# Patient Record
Sex: MALE | Race: WHITE | Employment: OTHER | URBAN - NONMETROPOLITAN AREA
[De-identification: names, ages, dates, MRNs, and addresses within clinical notes are randomized per-mention and may not be internally consistent; named-entity substitution may affect disease eponyms.]

---

## 2018-12-14 ENCOUNTER — APPOINTMENT (OUTPATIENT)
Dept: CT IMAGING | Age: 83
DRG: 481 | End: 2018-12-14
Payer: MEDICARE

## 2018-12-14 ENCOUNTER — APPOINTMENT (OUTPATIENT)
Dept: GENERAL RADIOLOGY | Age: 83
DRG: 481 | End: 2018-12-14
Payer: MEDICARE

## 2018-12-14 ENCOUNTER — HOSPITAL ENCOUNTER (INPATIENT)
Age: 83
LOS: 2 days | Discharge: HOME | DRG: 481 | End: 2018-12-16
Attending: INTERNAL MEDICINE | Admitting: ORTHOPAEDIC SURGERY
Payer: MEDICARE

## 2018-12-14 DIAGNOSIS — S72.142A CLOSED DISPLACED INTERTROCHANTERIC FRACTURE OF LEFT FEMUR, INITIAL ENCOUNTER (HCC): Primary | ICD-10-CM

## 2018-12-14 PROBLEM — E11.9 TYPE 2 DIABETES MELLITUS (HCC): Status: ACTIVE | Noted: 2018-12-14

## 2018-12-14 LAB
ANION GAP SERPL CALCULATED.3IONS-SCNC: 16 MEQ/L (ref 8–16)
BACTERIA: ABNORMAL /HPF
BASOPHILS # BLD: 0.6 %
BASOPHILS ABSOLUTE: 0 THOU/MM3 (ref 0–0.1)
BILIRUBIN URINE: NEGATIVE
BLOOD, URINE: NEGATIVE
BUN BLDV-MCNC: 11 MG/DL (ref 7–22)
CALCIUM SERPL-MCNC: 9 MG/DL (ref 8.5–10.5)
CASTS 2: ABNORMAL /LPF
CASTS UA: ABNORMAL /LPF
CHARACTER, URINE: CLEAR
CHLORIDE BLD-SCNC: 100 MEQ/L (ref 98–111)
CO2: 23 MEQ/L (ref 23–33)
COLOR: YELLOW
CREAT SERPL-MCNC: 0.7 MG/DL (ref 0.4–1.2)
CRYSTALS, UA: ABNORMAL
EKG ATRIAL RATE: 76 BPM
EKG Q-T INTERVAL: 456 MS
EKG QRS DURATION: 90 MS
EKG QTC CALCULATION (BAZETT): 415 MS
EKG R AXIS: -29 DEGREES
EKG T AXIS: 13 DEGREES
EKG VENTRICULAR RATE: 50 BPM
EOSINOPHIL # BLD: 3.6 %
EOSINOPHILS ABSOLUTE: 0.3 THOU/MM3 (ref 0–0.4)
EPITHELIAL CELLS, UA: ABNORMAL /HPF
ERYTHROCYTE [DISTWIDTH] IN BLOOD BY AUTOMATED COUNT: 13.8 % (ref 11.5–14.5)
ERYTHROCYTE [DISTWIDTH] IN BLOOD BY AUTOMATED COUNT: 44.7 FL (ref 35–45)
GFR SERPL CREATININE-BSD FRML MDRD: > 90 ML/MIN/1.73M2
GLUCOSE BLD-MCNC: 151 MG/DL (ref 70–108)
GLUCOSE BLD-MCNC: 157 MG/DL (ref 70–108)
GLUCOSE URINE: 100 MG/DL
HCT VFR BLD CALC: 42.9 % (ref 42–52)
HEMOGLOBIN: 14.2 GM/DL (ref 14–18)
IMMATURE GRANS (ABS): 0.05 THOU/MM3 (ref 0–0.07)
IMMATURE GRANULOCYTES: 0.7 %
KETONES, URINE: ABNORMAL
LEUKOCYTE ESTERASE, URINE: NEGATIVE
LYMPHOCYTES # BLD: 37.2 %
LYMPHOCYTES ABSOLUTE: 2.7 THOU/MM3 (ref 1–4.8)
MCH RBC QN AUTO: 29.4 PG (ref 26–33)
MCHC RBC AUTO-ENTMCNC: 33.1 GM/DL (ref 32.2–35.5)
MCV RBC AUTO: 88.8 FL (ref 80–94)
MISCELLANEOUS 2: ABNORMAL
MONOCYTES # BLD: 10.5 %
MONOCYTES ABSOLUTE: 0.8 THOU/MM3 (ref 0.4–1.3)
NITRITE, URINE: NEGATIVE
NUCLEATED RED BLOOD CELLS: 0 /100 WBC
OSMOLALITY CALCULATION: 279.9 MOSMOL/KG (ref 275–300)
PH UA: 5
PLATELET # BLD: 194 THOU/MM3 (ref 130–400)
PMV BLD AUTO: 11.1 FL (ref 9.4–12.4)
POTASSIUM SERPL-SCNC: 4 MEQ/L (ref 3.5–5.2)
PROTEIN UA: NEGATIVE
RBC # BLD: 4.83 MILL/MM3 (ref 4.7–6.1)
RBC URINE: ABNORMAL /HPF
RENAL EPITHELIAL, UA: ABNORMAL
SEG NEUTROPHILS: 47.4 %
SEGMENTED NEUTROPHILS ABSOLUTE COUNT: 3.4 THOU/MM3 (ref 1.8–7.7)
SODIUM BLD-SCNC: 139 MEQ/L (ref 135–145)
SPECIFIC GRAVITY, URINE: 1.02 (ref 1–1.03)
TROPONIN T: < 0.01 NG/ML
TROPONIN T: < 0.01 NG/ML
TSH SERPL DL<=0.05 MIU/L-ACNC: 2.03 UIU/ML (ref 0.4–4.2)
UROBILINOGEN, URINE: 0.2 EU/DL
WBC # BLD: 7.2 THOU/MM3 (ref 4.8–10.8)
WBC UA: ABNORMAL /HPF
YEAST: ABNORMAL

## 2018-12-14 PROCEDURE — 36415 COLL VENOUS BLD VENIPUNCTURE: CPT

## 2018-12-14 PROCEDURE — 72125 CT NECK SPINE W/O DYE: CPT

## 2018-12-14 PROCEDURE — 94760 N-INVAS EAR/PLS OXIMETRY 1: CPT

## 2018-12-14 PROCEDURE — 2580000003 HC RX 258: Performed by: NURSE PRACTITIONER

## 2018-12-14 PROCEDURE — 73552 X-RAY EXAM OF FEMUR 2/>: CPT

## 2018-12-14 PROCEDURE — 96374 THER/PROPH/DIAG INJ IV PUSH: CPT

## 2018-12-14 PROCEDURE — 81001 URINALYSIS AUTO W/SCOPE: CPT

## 2018-12-14 PROCEDURE — 84443 ASSAY THYROID STIM HORMONE: CPT

## 2018-12-14 PROCEDURE — 6360000002 HC RX W HCPCS: Performed by: INTERNAL MEDICINE

## 2018-12-14 PROCEDURE — 6820000001 HC L2 TRAUMA SURGERY EVALUATION

## 2018-12-14 PROCEDURE — 70450 CT HEAD/BRAIN W/O DYE: CPT

## 2018-12-14 PROCEDURE — 2580000003 HC RX 258: Performed by: INTERNAL MEDICINE

## 2018-12-14 PROCEDURE — 96375 TX/PRO/DX INJ NEW DRUG ADDON: CPT

## 2018-12-14 PROCEDURE — 6370000000 HC RX 637 (ALT 250 FOR IP): Performed by: INTERNAL MEDICINE

## 2018-12-14 PROCEDURE — 93005 ELECTROCARDIOGRAM TRACING: CPT | Performed by: INTERNAL MEDICINE

## 2018-12-14 PROCEDURE — 72170 X-RAY EXAM OF PELVIS: CPT

## 2018-12-14 PROCEDURE — 1200000003 HC TELEMETRY R&B

## 2018-12-14 PROCEDURE — 99221 1ST HOSP IP/OBS SF/LOW 40: CPT | Performed by: INTERNAL MEDICINE

## 2018-12-14 PROCEDURE — 80048 BASIC METABOLIC PNL TOTAL CA: CPT

## 2018-12-14 PROCEDURE — 99285 EMERGENCY DEPT VISIT HI MDM: CPT

## 2018-12-14 PROCEDURE — 93010 ELECTROCARDIOGRAM REPORT: CPT | Performed by: INTERNAL MEDICINE

## 2018-12-14 PROCEDURE — 85025 COMPLETE CBC W/AUTO DIFF WBC: CPT

## 2018-12-14 PROCEDURE — 82948 REAGENT STRIP/BLOOD GLUCOSE: CPT

## 2018-12-14 PROCEDURE — 84484 ASSAY OF TROPONIN QUANT: CPT

## 2018-12-14 RX ORDER — SODIUM CHLORIDE 9 MG/ML
INJECTION, SOLUTION INTRAVENOUS CONTINUOUS
Status: DISCONTINUED | OUTPATIENT
Start: 2018-12-14 | End: 2018-12-16

## 2018-12-14 RX ORDER — SODIUM CHLORIDE 0.9 % (FLUSH) 0.9 %
10 SYRINGE (ML) INJECTION PRN
Status: DISCONTINUED | OUTPATIENT
Start: 2018-12-14 | End: 2018-12-16 | Stop reason: HOSPADM

## 2018-12-14 RX ORDER — DEXTROSE MONOHYDRATE 50 MG/ML
100 INJECTION, SOLUTION INTRAVENOUS PRN
Status: DISCONTINUED | OUTPATIENT
Start: 2018-12-14 | End: 2018-12-16 | Stop reason: HOSPADM

## 2018-12-14 RX ORDER — MAGNESIUM 30 MG
30 TABLET ORAL 2 TIMES DAILY
COMMUNITY

## 2018-12-14 RX ORDER — MORPHINE SULFATE 4 MG/ML
4 INJECTION, SOLUTION INTRAMUSCULAR; INTRAVENOUS ONCE
Status: COMPLETED | OUTPATIENT
Start: 2018-12-14 | End: 2018-12-14

## 2018-12-14 RX ORDER — HYDROCODONE BITARTRATE AND ACETAMINOPHEN 5; 325 MG/1; MG/1
2 TABLET ORAL EVERY 4 HOURS PRN
Status: DISCONTINUED | OUTPATIENT
Start: 2018-12-14 | End: 2018-12-14

## 2018-12-14 RX ORDER — MORPHINE SULFATE 2 MG/ML
2 INJECTION, SOLUTION INTRAMUSCULAR; INTRAVENOUS
Status: DISCONTINUED | OUTPATIENT
Start: 2018-12-14 | End: 2018-12-15

## 2018-12-14 RX ORDER — DEXTROSE MONOHYDRATE 25 G/50ML
12.5 INJECTION, SOLUTION INTRAVENOUS PRN
Status: DISCONTINUED | OUTPATIENT
Start: 2018-12-14 | End: 2018-12-16 | Stop reason: HOSPADM

## 2018-12-14 RX ORDER — ACETAMINOPHEN 325 MG/1
650 TABLET ORAL EVERY 4 HOURS PRN
Status: DISCONTINUED | OUTPATIENT
Start: 2018-12-14 | End: 2018-12-14 | Stop reason: SDUPTHER

## 2018-12-14 RX ORDER — TRAMADOL HYDROCHLORIDE 50 MG/1
50 TABLET ORAL EVERY 6 HOURS PRN
Status: DISCONTINUED | OUTPATIENT
Start: 2018-12-14 | End: 2018-12-16 | Stop reason: HOSPADM

## 2018-12-14 RX ORDER — CALCIUM CARBONATE 500(1250)
500 TABLET ORAL DAILY
COMMUNITY

## 2018-12-14 RX ORDER — NICOTINE POLACRILEX 4 MG
15 LOZENGE BUCCAL PRN
Status: DISCONTINUED | OUTPATIENT
Start: 2018-12-14 | End: 2018-12-16 | Stop reason: HOSPADM

## 2018-12-14 RX ORDER — SIMVASTATIN 20 MG
20 TABLET ORAL NIGHTLY
COMMUNITY

## 2018-12-14 RX ORDER — HYDROCODONE BITARTRATE AND ACETAMINOPHEN 5; 325 MG/1; MG/1
1 TABLET ORAL EVERY 4 HOURS PRN
Status: DISCONTINUED | OUTPATIENT
Start: 2018-12-14 | End: 2018-12-14

## 2018-12-14 RX ORDER — SODIUM CHLORIDE 0.9 % (FLUSH) 0.9 %
10 SYRINGE (ML) INJECTION EVERY 12 HOURS SCHEDULED
Status: DISCONTINUED | OUTPATIENT
Start: 2018-12-14 | End: 2018-12-14 | Stop reason: SDUPTHER

## 2018-12-14 RX ORDER — ONDANSETRON 2 MG/ML
4 INJECTION INTRAMUSCULAR; INTRAVENOUS ONCE
Status: COMPLETED | OUTPATIENT
Start: 2018-12-14 | End: 2018-12-14

## 2018-12-14 RX ORDER — LISINOPRIL AND HYDROCHLOROTHIAZIDE 12.5; 1 MG/1; MG/1
1 TABLET ORAL DAILY
COMMUNITY

## 2018-12-14 RX ORDER — MORPHINE SULFATE 4 MG/ML
4 INJECTION, SOLUTION INTRAMUSCULAR; INTRAVENOUS
Status: DISCONTINUED | OUTPATIENT
Start: 2018-12-14 | End: 2018-12-15

## 2018-12-14 RX ORDER — ACETAMINOPHEN 500 MG
500 TABLET ORAL EVERY 6 HOURS
Status: DISCONTINUED | OUTPATIENT
Start: 2018-12-14 | End: 2018-12-16 | Stop reason: HOSPADM

## 2018-12-14 RX ORDER — ONDANSETRON 2 MG/ML
4 INJECTION INTRAMUSCULAR; INTRAVENOUS EVERY 6 HOURS PRN
Status: DISCONTINUED | OUTPATIENT
Start: 2018-12-14 | End: 2018-12-16 | Stop reason: HOSPADM

## 2018-12-14 RX ORDER — SODIUM CHLORIDE 0.9 % (FLUSH) 0.9 %
10 SYRINGE (ML) INJECTION EVERY 12 HOURS SCHEDULED
Status: DISCONTINUED | OUTPATIENT
Start: 2018-12-14 | End: 2018-12-16 | Stop reason: HOSPADM

## 2018-12-14 RX ORDER — SODIUM CHLORIDE 0.9 % (FLUSH) 0.9 %
10 SYRINGE (ML) INJECTION PRN
Status: DISCONTINUED | OUTPATIENT
Start: 2018-12-14 | End: 2018-12-14 | Stop reason: SDUPTHER

## 2018-12-14 RX ORDER — 0.9 % SODIUM CHLORIDE 0.9 %
1000 INTRAVENOUS SOLUTION INTRAVENOUS ONCE
Status: COMPLETED | OUTPATIENT
Start: 2018-12-14 | End: 2018-12-14

## 2018-12-14 RX ADMIN — SODIUM CHLORIDE: 9 INJECTION, SOLUTION INTRAVENOUS at 20:39

## 2018-12-14 RX ADMIN — SODIUM CHLORIDE 1000 ML: 9 INJECTION, SOLUTION INTRAVENOUS at 12:56

## 2018-12-14 RX ADMIN — ONDANSETRON 4 MG: 2 INJECTION INTRAMUSCULAR; INTRAVENOUS at 10:46

## 2018-12-14 RX ADMIN — MORPHINE SULFATE 4 MG: 4 INJECTION, SOLUTION INTRAMUSCULAR; INTRAVENOUS at 10:46

## 2018-12-14 ASSESSMENT — PAIN SCALES - GENERAL
PAINLEVEL_OUTOF10: 8
PAINLEVEL_OUTOF10: 1
PAINLEVEL_OUTOF10: 2
PAINLEVEL_OUTOF10: 8

## 2018-12-14 ASSESSMENT — PAIN DESCRIPTION - ORIENTATION
ORIENTATION: LEFT
ORIENTATION: LEFT

## 2018-12-14 ASSESSMENT — ENCOUNTER SYMPTOMS
NAUSEA: 0
PHOTOPHOBIA: 0
SHORTNESS OF BREATH: 0
VOMITING: 0
RHINORRHEA: 0
BACK PAIN: 0

## 2018-12-14 ASSESSMENT — PAIN DESCRIPTION - LOCATION
LOCATION: LEG
LOCATION: LEG

## 2018-12-14 ASSESSMENT — PAIN DESCRIPTION - FREQUENCY: FREQUENCY: INTERMITTENT

## 2018-12-14 ASSESSMENT — PAIN DESCRIPTION - DESCRIPTORS: DESCRIPTORS: ACHING;DISCOMFORT

## 2018-12-14 NOTE — ED NOTES
Pt transported to [facility] by cart in stable condition. IV fluids running and IV is patent. Called [facility] and told Dena Robertson that the patient was on their way to the unit.        Kinjal Leal, JIL  82/56/25 [id number]

## 2018-12-14 NOTE — ED PROVIDER NOTES
MG tablet Take 20 mg by mouth nightly      calcium carbonate (OSCAL) 500 MG TABS tablet Take 500 mg by mouth daily      magnesium 30 MG tablet Take 30 mg by mouth 2 times daily      blood glucose test strips (ASCENSIA AUTODISC VI;ONE TOUCH ULTRA TEST VI) strip 1 each by In Vitro route daily As needed. vitamin D (CHOLECALCIFEROL) 1000 UNIT TABS tablet Take 1,000 Units by mouth daily             ALLERGIES     has No Known Allergies. FAMILY HISTORY     has no family status information on file. family history is not on file. SOCIAL HISTORY          PHYSICAL EXAM     INITIAL VITALS:  weight is 170 lb (77.1 kg). His oral temperature is 98.8 °F (37.1 °C). His blood pressure is 141/68 (abnormal) and his pulse is 93. His respiration is 18 and oxygen saturation is 96%. Physical Exam   Constitutional: He appears well-developed and well-nourished. Backboard in place. HENT:   Head: Normocephalic and atraumatic. Right Ear: External ear normal.   Left Ear: External ear normal.   Eyes: Pupils are equal, round, and reactive to light. EOM are normal.   Neck: Normal range of motion. Neck supple. Cardiovascular: Normal rate and normal heart sounds. An irregularly irregular rhythm present. Exam reveals no friction rub. No murmur heard. Pulses:       Dorsalis pedis pulses are 2+ on the left side. Posterior tibial pulses are 2+ on the left side. Pulmonary/Chest: Effort normal and breath sounds normal. No respiratory distress. He has no wheezes. He has no rales. Abdominal: Soft. He exhibits no distension and no mass. There is no tenderness. There is no guarding. Musculoskeletal: He exhibits deformity. Left hip: He exhibits deformity. LLE is externally rotated with deformity at left hip. Nursing note and vitals reviewed.         DIAGNOSTIC RESULTS     EKG: All EKG's are interpreted by the Emergency Arshaun Mmoo who either signs or Co-signs this chart in the absence of a cardiologist.  EKG interpreted by Devonte Cm MD:       EKG Fall (Preliminary result)   Component (Lab Inquiry)   Collection Time Result Time Ventricular Rate Atrial Rate QRS Duration Q-T Interval QTc Calculation (Bazett)   12/14/18 10:35:01 12/14/18 10:38:24 50 76 90 456 415       Collection Time Result Time R Axis T Axis   12/14/18 10:35:01 12/14/18 10:38:24 -29 13       Preliminary result                Narrative:    Atrial fibrillation with slow ventricular response  Abnormal ECG  When compared with ECG of 10-APR-2000 09:16,  Atrial fibrillation has replaced Sinus rhythm                  RADIOLOGY: non-plain film images(s) such as CT, Ultrasound and MRI are read by theradiologist.    CT Cervical Spine WO Contrast   Final Result    No fracture. **This report has been created using voice recognition software. It may contain minor errors which are inherent in voice recognition technology. **      Final report electronically signed by Dr. Kiana Youssef on 12/14/2018 11:43 AM      CT Head WO Contrast   Final Result    No evidence of an acute process. **This report has been created using voice recognition software. It may contain minor errors which are inherent in voice recognition technology. **      Final report electronically signed by Dr. Kiana Youssef on 12/14/2018 11:41 AM      XR PELVIS (1-2 VIEWS)   Final Result   1. No pelvic fracture. 2. Intertrochanteric fracture of the proximal left femur. Final report electronically signed by Dr. Nadira Wilcox on 12/14/2018 11:25 AM      XR FEMUR LEFT (MIN 2 VIEWS)   Final Result      Intertrochanteric fracture of the proximal femur.       Final report electronically signed by Dr. Nadira Wilcox on 12/14/2018 11:23 AM           LABS:   Labs Reviewed   BASIC METABOLIC PANEL - Abnormal; Notable for the following:        Result Value    Glucose 151 (*)     All other components within normal limits   CBC WITH AUTO DIFFERENTIAL   TROPONIN ANION GAP   GLOMERULAR FILTRATION RATE, ESTIMATED   OSMOLALITY   URINE RT REFLEX TO CULTURE       EMERGENCY DEPARTMENT COURSE:   Vitals:    Vitals:    12/14/18 1037 12/14/18 1203 12/14/18 1325 12/14/18 1424   BP: (!) 143/58 (!) 143/58 (!) 141/68    Pulse:  52 93    Resp:  18 18 18   Temp:       TempSrc:       SpO2:  98% 98% 96%   Weight:           10:31 AM: The patient was seen and evaluated. MDM:  Patient had fracture of left intertrochanteric part of the femur. Patient does have a new onset A. fib with bradycardia. I talked to Dr. Verner Last about A. fib and bradycardia. Patient will be admitted by orthopedics because of the fracture. Patient's lab the testing were reviewed and discussed with the patient and the wife in the room. CRITICAL CARE:   None      CONSULTS:  Janice Bhatt CNP (Ortho)  Dr. Verner Last (cardiology)    PROCEDURES:  None      FINAL IMPRESSION      1. Closed displaced intertrochanteric fracture of left femur, initial encounter [facility])          DISPOSITION/PLAN   Admit    PATIENT REFERRED TO:  Aisha Leyva MD  [address] [place] [address]  [phone]            DISCHARGE MEDICATIONS:  Current Discharge Medication List          (Please note that portions of this note were completed with a voice recognition program.  Efforts were made to edit the dictations butoccasionally words are mis-transcribed.)    Scribe: Jordan Barrow 12/14/18 10:31 AM Scribing for and in the presence of Pati Multani MD.    Signed by: John Vicente 12/14/18 3:06 PM    Provider:  Gisel Britt performed the services described in the documentation, reviewed and edited the documentation which was dictated to the scribe in my presence, and it accurately records my words and actions.     Faizan Cardensa MD12/14/18 3:06 PM        Pati Multani MD  12/14/18 [id number]

## 2018-12-14 NOTE — CONSULTS
Consult History & Physical      Patient:  Zahra Gonzalez  YOB: 1932    MRN: [id number]     Acct: [de-identified]      Chief Complaint:  Medical mgt    Date of Service: Pt seen/examined in consultation on 12/14/18    History Of Present Illness:      80 y.o. male who we are asked to see/evaluate by Samantha Nunez MD for medical management of Diabetes. Pt was in good health until today . He was in his basement and slipped and landed on his left hip. Felt sudden pain and inability to move the extremity. Pt presented to ED and x ray shows closed fracture. Ortho admitted and Hospitalist consulted for medical mgt. Cardiology is also onboard and I will defer cardiovascular clearance. Pt has no limitation with ambulation. Rhythm on EKG is  A fib but he has no hx. Pt denies any other symptoms. He has no pain as long as he lays still    Past Medical History:        Diagnosis Date    Diabetes mellitus ([facility] [address]        Past Surgical History:        Procedure Laterality Date    CHOLECYSTECTOMY         Medications Prior to Admission:    Prior to Admission medications    Medication Sig Start Date End Date Taking? Authorizing Provider   lisinopril-hydrochlorothiazide (PRINZIDE;ZESTORETIC) 10-12.5 MG per tablet Take 1 tablet by mouth daily   Yes Historical Provider, MD   saxagliptin (ONGLYZA) 5 MG TABS tablet Take 5 mg by mouth daily   Yes Historical Provider, MD   metFORMIN (GLUCOPHAGE) 500 MG tablet Take 500 mg by mouth 3 times daily   Yes Historical Provider, MD   simvastatin (ZOCOR) 20 MG tablet Take 20 mg by mouth nightly   Yes Historical Provider, MD   calcium carbonate (OSCAL) 500 MG TABS tablet Take 500 mg by mouth daily   Yes Historical Provider, MD   magnesium 30 MG tablet Take 30 mg by mouth 2 times daily   Yes Historical Provider, MD   blood glucose test strips (ASCENSIA AUTODISC VI;ONE TOUCH ULTRA TEST VI) strip 1 each by In Vitro route daily As needed.    Yes Historical Provider, MD   vitamin D

## 2018-12-14 NOTE — H&P
Meds:.    Allergies:  Patient has no known allergies. Social History:   Social History     Social History    Marital status:      Spouse name: N/A    Number of children: N/A    Years of education: N/A     Social History Main Topics    Smoking status: Not on file    Smokeless tobacco: Not on file    Alcohol use Not on file    Drug use: Unknown    Sexual activity: Not on file     Other Topics Concern    Not on file     Social History Narrative    No narrative on file     History   Smoking Status    Not on file   Smokeless Tobacco    Not on file     History   Alcohol use Not on file     History   Drug use: Unknown       Family History:  No family history on file. REVIEW OF SYSTEMS:  Constitutional: Denies any fever, chills. Derm: Denies any rash or skin color change. Eyes: Denies blurred or decreased in vision. Ent: Denies any tinnitus or vertigo. Resp: Denies any cough or shortness of breath. CV: Denies any syncope, palpitations or chest pain. GI:  Denies any abdominal pain, nausea, vomiting, constipation or diarrhea. : Denies any hematuria, hesitancy, or dysuria. Heme/Lymph: Denies any bleeding. Musculoskeletal: Positive for left hip pain  Neuro: Denies any dizziness, paresthesia or weakness. PHYSICAL EXAM:  Patient Vitals for the past 24 hrs:   BP Temp Temp src Pulse Resp SpO2 Weight   12/14/18 1203 (!) 143/58 - - 52 18 98 % -   12/14/18 1037 (!) 143/58 - - - - - -   12/14/18 1036 - 98.8 °F (37.1 °C) Oral 58 20 96 % 170 lb (77.1 kg)     General appearance:  Alert and oriented x 3. No apparent distress, appears stated age and cooperative. HEENT:  Normal cephalic, atraumatic without obvious deformity. Pupils equal, round, and reactive to light. Conjunctivae/corneas clear. Neck: Supple, with full range of motion. Trachea midline. Respiratory:  Normal respiratory effort. No audible Wheezes or Rhonchi. Cardiovascular:  Regular rate and rhythm.    Abdomen: Soft, non-tender, non-distended. Musculoskeletal:  Left hip skin intact, leg flexed and externally rotated. Flex and extends toes and ankle left. Calf soft, non-tender. SILT. Skin: Skin color, texture, turgor normal.  No rashes or lesions. Neurologic:  Neurovascularly intact without any focal sensory/motor deficits. Sensation intact. Capillary Refill: Brisk,< 3 seconds   Peripheral Pulses: +2 palpable, equal bilaterally    DATA:  CBC:   Lab Results   Component Value Date    WBC 7.2 12/14/2018    HGB 14.2 12/14/2018     12/14/2018     BMP:  Lab Results   Component Value Date     12/14/2018    K 4.0 12/14/2018     12/14/2018    CO2 23 12/14/2018    BUN 11 12/14/2018    CREATININE 0.7 12/14/2018    CALCIUM 9.0 12/14/2018    GLUCOSE 151 12/14/2018     PT/INR:  No results found for: PROTIME, INR  Troponin:  No results found for: TROPONINI  No results for input(s): LIPASE, AMYLASE in the last 72 hours. No results for input(s): AST, ALT, BILIDIR, BILITOT, ALKPHOS in the last 72 hours. Radiology:  Xr Pelvis (1-2 Views)    Result Date: 12/14/2018  PROCEDURE: XR PELVIS (1-2 VIEWS) CLINICAL INFORMATION: Pain following a fall TECHNIQUE: Single AP pelvic radiograph COMPARISON: None FINDINGS: An intertrochanteric fracture of the proximal left femur is evaluated on same-day radiographs of the femur. There is no pelvic fracture. Bones are osteopenic. Degenerative changes are present in the lower lumbar spine. Phleboliths and therapy seeds are noted in the pelvis. 1. No pelvic fracture. 2. Intertrochanteric fracture of the proximal left femur. Final report electronically signed by Dr. Myesha Juan on 12/14/2018 11:25 AM    Xr Femur Left (min 2 Views)    Result Date: 12/14/2018  PROCEDURE: XR FEMUR LEFT (MIN 2 VIEWS) CLINICAL INFORMATION: Pain following a fall TECHNIQUE: 4 views of the left femur COMPARISON: None FINDINGS: There is a minimally displaced comminuted fracture of the intertrochanteric proximal femur.  No prevertebral soft tissue swelling. There is degenerative disc disease and there are facet degenerative changes throughout the cervical spine. There is at least mild spinal canal stenosis at the C3-4 level. No suspicious osseous lesions are present. There are no suspicious findings in the cervical soft tissues. There are no suspicious findings in the lung apices. No fracture. **This report has been created using voice recognition software. It may contain minor errors which are inherent in voice recognition technology. ** Final report electronically signed by Dr. Marixa Haley on 12/14/2018 11:43 AM      ASSESSMENT:Active Problems:    Intertrochanteric fracture of left femur, closed, initial encounter ([facility] [address]  Resolved Problems:    * No resolved hospital problems. *       PLAN as discussed with Dr. Concetta Garnica:  Admit to telemetry floor. Cardiology for clearance and management of new onset A-fib, Dr. Kavita Santamaria. Continue home medications  Pain medications as directed. Bedrest with NV checks. Hold all ACs in anticipation of surgery. NPO at MN. Consent for ORIF left prox femur. Procedure discussed with patient and family. Questions answered. Patient consents.        Electronically signed by HECTOR Colón CNP on 12/14/2018 at 12:50 PM

## 2018-12-14 NOTE — ED NOTES
Bed: 010A  Expected date:   Expected time:   Means of arrival: [facility]  Comments:     Bryan Ahn RN  12/14/18 [id number]

## 2018-12-15 ENCOUNTER — APPOINTMENT (OUTPATIENT)
Dept: GENERAL RADIOLOGY | Age: 83
DRG: 481 | End: 2018-12-15
Payer: MEDICARE

## 2018-12-15 ENCOUNTER — ANESTHESIA EVENT (OUTPATIENT)
Dept: OPERATING ROOM | Age: 83
DRG: 481 | End: 2018-12-15
Payer: MEDICARE

## 2018-12-15 ENCOUNTER — ANESTHESIA (OUTPATIENT)
Dept: OPERATING ROOM | Age: 83
DRG: 481 | End: 2018-12-15
Payer: MEDICARE

## 2018-12-15 VITALS
OXYGEN SATURATION: 98 % | DIASTOLIC BLOOD PRESSURE: 76 MMHG | TEMPERATURE: 99 F | SYSTOLIC BLOOD PRESSURE: 143 MMHG | RESPIRATION RATE: 6 BRPM

## 2018-12-15 LAB
ANION GAP SERPL CALCULATED.3IONS-SCNC: 12 MEQ/L (ref 8–16)
BUN BLDV-MCNC: 5 MG/DL (ref 7–22)
CALCIUM SERPL-MCNC: 8.6 MG/DL (ref 8.5–10.5)
CHLORIDE BLD-SCNC: 108 MEQ/L (ref 98–111)
CO2: 23 MEQ/L (ref 23–33)
CREAT SERPL-MCNC: 0.6 MG/DL (ref 0.4–1.2)
EKG ATRIAL RATE: 82 BPM
EKG ATRIAL RATE: 91 BPM
EKG P AXIS: 27 DEGREES
EKG P AXIS: 74 DEGREES
EKG P-R INTERVAL: 260 MS
EKG P-R INTERVAL: 294 MS
EKG Q-T INTERVAL: 362 MS
EKG Q-T INTERVAL: 400 MS
EKG QRS DURATION: 106 MS
EKG QRS DURATION: 90 MS
EKG QTC CALCULATION (BAZETT): 445 MS
EKG QTC CALCULATION (BAZETT): 467 MS
EKG R AXIS: -37 DEGREES
EKG R AXIS: -42 DEGREES
EKG T AXIS: 22 DEGREES
EKG T AXIS: 28 DEGREES
EKG VENTRICULAR RATE: 82 BPM
EKG VENTRICULAR RATE: 91 BPM
GFR SERPL CREATININE-BSD FRML MDRD: > 90 ML/MIN/1.73M2
GLUCOSE BLD-MCNC: 144 MG/DL (ref 70–108)
GLUCOSE BLD-MCNC: 146 MG/DL (ref 70–108)
GLUCOSE BLD-MCNC: 148 MG/DL (ref 70–108)
GLUCOSE BLD-MCNC: 250 MG/DL (ref 70–108)
GLUCOSE BLD-MCNC: 286 MG/DL (ref 70–108)
HCT VFR BLD CALC: 33.8 % (ref 42–52)
HEMOGLOBIN: 11.1 GM/DL (ref 14–18)
LV EF: 58 %
LVEF MODALITY: NORMAL
MAGNESIUM: 1.6 MG/DL (ref 1.6–2.4)
POTASSIUM SERPL-SCNC: 3.9 MEQ/L (ref 3.5–5.2)
SODIUM BLD-SCNC: 143 MEQ/L (ref 135–145)
TROPONIN T: < 0.01 NG/ML

## 2018-12-15 PROCEDURE — 6370000000 HC RX 637 (ALT 250 FOR IP): Performed by: INTERNAL MEDICINE

## 2018-12-15 PROCEDURE — 84484 ASSAY OF TROPONIN QUANT: CPT

## 2018-12-15 PROCEDURE — 85014 HEMATOCRIT: CPT

## 2018-12-15 PROCEDURE — 94760 N-INVAS EAR/PLS OXIMETRY 1: CPT

## 2018-12-15 PROCEDURE — 6370000000 HC RX 637 (ALT 250 FOR IP): Performed by: PHYSICIAN ASSISTANT

## 2018-12-15 PROCEDURE — 6360000002 HC RX W HCPCS: Performed by: SPECIALIST

## 2018-12-15 PROCEDURE — 80048 BASIC METABOLIC PNL TOTAL CA: CPT

## 2018-12-15 PROCEDURE — C1713 ANCHOR/SCREW BN/BN,TIS/BN: HCPCS | Performed by: ORTHOPAEDIC SURGERY

## 2018-12-15 PROCEDURE — 93010 ELECTROCARDIOGRAM REPORT: CPT | Performed by: INTERNAL MEDICINE

## 2018-12-15 PROCEDURE — 3700000000 HC ANESTHESIA ATTENDED CARE: Performed by: ORTHOPAEDIC SURGERY

## 2018-12-15 PROCEDURE — 3600000014 HC SURGERY LEVEL 4 ADDTL 15MIN: Performed by: ORTHOPAEDIC SURGERY

## 2018-12-15 PROCEDURE — 2580000003 HC RX 258: Performed by: NURSE PRACTITIONER

## 2018-12-15 PROCEDURE — 85018 HEMOGLOBIN: CPT

## 2018-12-15 PROCEDURE — 2720000010 HC SURG SUPPLY STERILE: Performed by: ORTHOPAEDIC SURGERY

## 2018-12-15 PROCEDURE — 3700000001 HC ADD 15 MINUTES (ANESTHESIA): Performed by: ORTHOPAEDIC SURGERY

## 2018-12-15 PROCEDURE — 83735 ASSAY OF MAGNESIUM: CPT

## 2018-12-15 PROCEDURE — 6360000002 HC RX W HCPCS: Performed by: PHYSICIAN ASSISTANT

## 2018-12-15 PROCEDURE — 7100000000 HC PACU RECOVERY - FIRST 15 MIN: Performed by: ORTHOPAEDIC SURGERY

## 2018-12-15 PROCEDURE — 99232 SBSQ HOSP IP/OBS MODERATE 35: CPT | Performed by: INTERNAL MEDICINE

## 2018-12-15 PROCEDURE — 2709999900 HC NON-CHARGEABLE SUPPLY: Performed by: ORTHOPAEDIC SURGERY

## 2018-12-15 PROCEDURE — 1200000003 HC TELEMETRY R&B

## 2018-12-15 PROCEDURE — 3600000004 HC SURGERY LEVEL 4 BASE: Performed by: ORTHOPAEDIC SURGERY

## 2018-12-15 PROCEDURE — 0QS706Z REPOSITION LEFT UPPER FEMUR WITH INTRAMEDULLARY INTERNAL FIXATION DEVICE, OPEN APPROACH: ICD-10-PCS | Performed by: ORTHOPAEDIC SURGERY

## 2018-12-15 PROCEDURE — 6370000000 HC RX 637 (ALT 250 FOR IP): Performed by: NURSE PRACTITIONER

## 2018-12-15 PROCEDURE — 6370000000 HC RX 637 (ALT 250 FOR IP): Performed by: FAMILY MEDICINE

## 2018-12-15 PROCEDURE — 7100000001 HC PACU RECOVERY - ADDTL 15 MIN: Performed by: ORTHOPAEDIC SURGERY

## 2018-12-15 PROCEDURE — 2580000003 HC RX 258: Performed by: PHYSICIAN ASSISTANT

## 2018-12-15 PROCEDURE — 2500000003 HC RX 250 WO HCPCS: Performed by: ORTHOPAEDIC SURGERY

## 2018-12-15 PROCEDURE — 3209999900 FLUORO FOR SURGICAL PROCEDURES

## 2018-12-15 PROCEDURE — 93005 ELECTROCARDIOGRAM TRACING: CPT | Performed by: INTERNAL MEDICINE

## 2018-12-15 PROCEDURE — 82948 REAGENT STRIP/BLOOD GLUCOSE: CPT

## 2018-12-15 PROCEDURE — 36415 COLL VENOUS BLD VENIPUNCTURE: CPT

## 2018-12-15 PROCEDURE — 2500000003 HC RX 250 WO HCPCS: Performed by: SPECIALIST

## 2018-12-15 PROCEDURE — 73552 X-RAY EXAM OF FEMUR 2/>: CPT

## 2018-12-15 PROCEDURE — 2580000003 HC RX 258: Performed by: SPECIALIST

## 2018-12-15 PROCEDURE — 93306 TTE W/DOPPLER COMPLETE: CPT

## 2018-12-15 DEVICE — INTERTAN LAG/COMPRESSION SCREW KIT                                    95MM / 90MM
Type: IMPLANTABLE DEVICE | Status: FUNCTIONAL
Brand: TRIGEN

## 2018-12-15 DEVICE — TRIGEN INTERTAN 1.5 13MM X 40CM                                    125DEGREE LEFT
Type: IMPLANTABLE DEVICE | Status: FUNCTIONAL
Brand: TRIGEN

## 2018-12-15 DEVICE — TRIGEN LOW PROFILE SCREW 5.0MM X 42.5MM
Type: IMPLANTABLE DEVICE | Status: FUNCTIONAL
Brand: TRIGEN

## 2018-12-15 RX ORDER — LISINOPRIL AND HYDROCHLOROTHIAZIDE 12.5; 1 MG/1; MG/1
1 TABLET ORAL DAILY
Status: DISCONTINUED | OUTPATIENT
Start: 2018-12-15 | End: 2018-12-15 | Stop reason: SDUPTHER

## 2018-12-15 RX ORDER — PROPOFOL 10 MG/ML
INJECTION, EMULSION INTRAVENOUS PRN
Status: DISCONTINUED | OUTPATIENT
Start: 2018-12-15 | End: 2018-12-15 | Stop reason: SDUPTHER

## 2018-12-15 RX ORDER — CALCIUM CARBONATE 500(1250)
500 TABLET ORAL DAILY
Status: DISCONTINUED | OUTPATIENT
Start: 2018-12-15 | End: 2018-12-16 | Stop reason: HOSPADM

## 2018-12-15 RX ORDER — CEFAZOLIN SODIUM 1 G/3ML
INJECTION, POWDER, FOR SOLUTION INTRAMUSCULAR; INTRAVENOUS PRN
Status: DISCONTINUED | OUTPATIENT
Start: 2018-12-15 | End: 2018-12-15 | Stop reason: SDUPTHER

## 2018-12-15 RX ORDER — MULTIVITAMIN/IRON/FOLIC ACID 18MG-0.4MG
125 TABLET ORAL DAILY
Status: DISCONTINUED | OUTPATIENT
Start: 2018-12-15 | End: 2018-12-16 | Stop reason: HOSPADM

## 2018-12-15 RX ORDER — LISINOPRIL 10 MG/1
10 TABLET ORAL DAILY
Status: DISCONTINUED | OUTPATIENT
Start: 2018-12-15 | End: 2018-12-16 | Stop reason: HOSPADM

## 2018-12-15 RX ORDER — ACETAMINOPHEN 325 MG/1
650 TABLET ORAL EVERY 4 HOURS PRN
Status: DISCONTINUED | OUTPATIENT
Start: 2018-12-15 | End: 2018-12-16 | Stop reason: HOSPADM

## 2018-12-15 RX ORDER — SUCCINYLCHOLINE CHLORIDE 20 MG/ML
INJECTION INTRAMUSCULAR; INTRAVENOUS PRN
Status: DISCONTINUED | OUTPATIENT
Start: 2018-12-15 | End: 2018-12-15 | Stop reason: SDUPTHER

## 2018-12-15 RX ORDER — DEXAMETHASONE SODIUM PHOSPHATE 4 MG/ML
INJECTION, SOLUTION INTRA-ARTICULAR; INTRALESIONAL; INTRAMUSCULAR; INTRAVENOUS; SOFT TISSUE PRN
Status: DISCONTINUED | OUTPATIENT
Start: 2018-12-15 | End: 2018-12-15 | Stop reason: SDUPTHER

## 2018-12-15 RX ORDER — HYDRALAZINE HYDROCHLORIDE 20 MG/ML
5 INJECTION INTRAMUSCULAR; INTRAVENOUS EVERY 10 MIN PRN
Status: DISCONTINUED | OUTPATIENT
Start: 2018-12-15 | End: 2018-12-15 | Stop reason: HOSPADM

## 2018-12-15 RX ORDER — FENTANYL CITRATE 50 UG/ML
INJECTION, SOLUTION INTRAMUSCULAR; INTRAVENOUS PRN
Status: DISCONTINUED | OUTPATIENT
Start: 2018-12-15 | End: 2018-12-15 | Stop reason: SDUPTHER

## 2018-12-15 RX ORDER — HYDROCHLOROTHIAZIDE 12.5 MG/1
12.5 CAPSULE, GELATIN COATED ORAL DAILY
Status: DISCONTINUED | OUTPATIENT
Start: 2018-12-15 | End: 2018-12-16 | Stop reason: HOSPADM

## 2018-12-15 RX ORDER — SIMVASTATIN 20 MG
20 TABLET ORAL NIGHTLY
Status: DISCONTINUED | OUTPATIENT
Start: 2018-12-15 | End: 2018-12-16 | Stop reason: HOSPADM

## 2018-12-15 RX ORDER — SENNA PLUS 8.6 MG/1
1 TABLET ORAL 2 TIMES DAILY
Status: DISCONTINUED | OUTPATIENT
Start: 2018-12-15 | End: 2018-12-16 | Stop reason: HOSPADM

## 2018-12-15 RX ORDER — ONDANSETRON 2 MG/ML
4 INJECTION INTRAMUSCULAR; INTRAVENOUS
Status: DISCONTINUED | OUTPATIENT
Start: 2018-12-15 | End: 2018-12-15 | Stop reason: HOSPADM

## 2018-12-15 RX ORDER — MEPERIDINE HYDROCHLORIDE 25 MG/ML
12.5 INJECTION INTRAMUSCULAR; INTRAVENOUS; SUBCUTANEOUS EVERY 5 MIN PRN
Status: DISCONTINUED | OUTPATIENT
Start: 2018-12-15 | End: 2018-12-15 | Stop reason: HOSPADM

## 2018-12-15 RX ORDER — SODIUM CHLORIDE, SODIUM LACTATE, POTASSIUM CHLORIDE, CALCIUM CHLORIDE 600; 310; 30; 20 MG/100ML; MG/100ML; MG/100ML; MG/100ML
INJECTION, SOLUTION INTRAVENOUS CONTINUOUS PRN
Status: DISCONTINUED | OUTPATIENT
Start: 2018-12-15 | End: 2018-12-15 | Stop reason: SDUPTHER

## 2018-12-15 RX ORDER — KETOROLAC TROMETHAMINE 30 MG/ML
15 INJECTION, SOLUTION INTRAMUSCULAR; INTRAVENOUS EVERY 6 HOURS
Status: DISCONTINUED | OUTPATIENT
Start: 2018-12-15 | End: 2018-12-16 | Stop reason: HOSPADM

## 2018-12-15 RX ORDER — GLYCOPYRROLATE 1 MG/5 ML
SYRINGE (ML) INTRAVENOUS PRN
Status: DISCONTINUED | OUTPATIENT
Start: 2018-12-15 | End: 2018-12-15 | Stop reason: SDUPTHER

## 2018-12-15 RX ORDER — DOCUSATE SODIUM 100 MG/1
100 CAPSULE, LIQUID FILLED ORAL 2 TIMES DAILY
Status: DISCONTINUED | OUTPATIENT
Start: 2018-12-15 | End: 2018-12-16 | Stop reason: HOSPADM

## 2018-12-15 RX ORDER — LIDOCAINE HYDROCHLORIDE 20 MG/ML
INJECTION, SOLUTION INFILTRATION; PERINEURAL PRN
Status: DISCONTINUED | OUTPATIENT
Start: 2018-12-15 | End: 2018-12-15 | Stop reason: SDUPTHER

## 2018-12-15 RX ORDER — BUPIVACAINE HYDROCHLORIDE AND EPINEPHRINE 5; 5 MG/ML; UG/ML
INJECTION, SOLUTION EPIDURAL; INTRACAUDAL; PERINEURAL PRN
Status: DISCONTINUED | OUTPATIENT
Start: 2018-12-15 | End: 2018-12-15 | Stop reason: HOSPADM

## 2018-12-15 RX ORDER — FENTANYL CITRATE 50 UG/ML
50 INJECTION, SOLUTION INTRAMUSCULAR; INTRAVENOUS EVERY 5 MIN PRN
Status: DISCONTINUED | OUTPATIENT
Start: 2018-12-15 | End: 2018-12-15 | Stop reason: HOSPADM

## 2018-12-15 RX ORDER — ONDANSETRON 2 MG/ML
INJECTION INTRAMUSCULAR; INTRAVENOUS PRN
Status: DISCONTINUED | OUTPATIENT
Start: 2018-12-15 | End: 2018-12-15 | Stop reason: SDUPTHER

## 2018-12-15 RX ADMIN — PHENYLEPHRINE HYDROCHLORIDE 100 MCG: 10 INJECTION INTRAVENOUS at 10:41

## 2018-12-15 RX ADMIN — HYDROCHLOROTHIAZIDE 12.5 MG: 12.5 CAPSULE ORAL at 15:22

## 2018-12-15 RX ADMIN — VITAMIN D, TAB 1000IU (100/BT) 1000 UNITS: 25 TAB at 15:22

## 2018-12-15 RX ADMIN — Medication 6 UNITS: at 18:05

## 2018-12-15 RX ADMIN — CALCIUM 500 MG: 500 TABLET ORAL at 15:22

## 2018-12-15 RX ADMIN — DEXTROSE MONOHYDRATE 2 G: 50 INJECTION, SOLUTION INTRAVENOUS at 04:19

## 2018-12-15 RX ADMIN — ACETAMINOPHEN 500 MG: 500 TABLET, FILM COATED ORAL at 00:44

## 2018-12-15 RX ADMIN — FENTANYL CITRATE 25 MCG: 50 INJECTION INTRAMUSCULAR; INTRAVENOUS at 12:09

## 2018-12-15 RX ADMIN — ACETAMINOPHEN 500 MG: 500 TABLET, FILM COATED ORAL at 04:19

## 2018-12-15 RX ADMIN — MAGNESIUM HYDROXIDE 30 ML: 400 SUSPENSION ORAL at 09:17

## 2018-12-15 RX ADMIN — PHENYLEPHRINE HYDROCHLORIDE 100 MCG: 10 INJECTION INTRAVENOUS at 11:43

## 2018-12-15 RX ADMIN — LISINOPRIL 10 MG: 10 TABLET ORAL at 15:22

## 2018-12-15 RX ADMIN — DOCUSATE SODIUM 100 MG: 100 CAPSULE, LIQUID FILLED ORAL at 21:30

## 2018-12-15 RX ADMIN — FENTANYL CITRATE 25 MCG: 50 INJECTION INTRAMUSCULAR; INTRAVENOUS at 11:38

## 2018-12-15 RX ADMIN — Medication 2 G: at 15:20

## 2018-12-15 RX ADMIN — KETOROLAC TROMETHAMINE 15 MG: 30 INJECTION, SOLUTION INTRAMUSCULAR at 21:29

## 2018-12-15 RX ADMIN — ACETAMINOPHEN 500 MG: 500 TABLET, FILM COATED ORAL at 09:17

## 2018-12-15 RX ADMIN — SIMVASTATIN 20 MG: 20 TABLET, FILM COATED ORAL at 21:34

## 2018-12-15 RX ADMIN — INSULIN LISPRO 1 UNITS: 100 INJECTION, SOLUTION INTRAVENOUS; SUBCUTANEOUS at 00:45

## 2018-12-15 RX ADMIN — LIDOCAINE HYDROCHLORIDE 100 MG: 20 INJECTION, SOLUTION INFILTRATION; PERINEURAL at 10:41

## 2018-12-15 RX ADMIN — FENTANYL CITRATE 25 MCG: 50 INJECTION INTRAMUSCULAR; INTRAVENOUS at 10:45

## 2018-12-15 RX ADMIN — DEXAMETHASONE SODIUM PHOSPHATE 6 MG: 4 INJECTION, SOLUTION INTRAMUSCULAR; INTRAVENOUS at 11:11

## 2018-12-15 RX ADMIN — INSULIN LISPRO 3 UNITS: 100 INJECTION, SOLUTION INTRAVENOUS; SUBCUTANEOUS at 21:36

## 2018-12-15 RX ADMIN — TRAMADOL HYDROCHLORIDE 50 MG: 50 TABLET, FILM COATED ORAL at 15:20

## 2018-12-15 RX ADMIN — PROPOFOL 120 MG: 10 INJECTION, EMULSION INTRAVENOUS at 10:41

## 2018-12-15 RX ADMIN — Medication 2 G: at 22:00

## 2018-12-15 RX ADMIN — SUCCINYLCHOLINE CHLORIDE 100 MG: 20 INJECTION, SOLUTION INTRAMUSCULAR; INTRAVENOUS at 10:41

## 2018-12-15 RX ADMIN — SENNOSIDES 8.6 MG: 8.6 TABLET, FILM COATED ORAL at 21:34

## 2018-12-15 RX ADMIN — FENTANYL CITRATE 25 MCG: 50 INJECTION INTRAMUSCULAR; INTRAVENOUS at 11:20

## 2018-12-15 RX ADMIN — ACETAMINOPHEN 500 MG: 500 TABLET, FILM COATED ORAL at 23:00

## 2018-12-15 RX ADMIN — FENTANYL CITRATE 25 MCG: 50 INJECTION INTRAMUSCULAR; INTRAVENOUS at 12:20

## 2018-12-15 RX ADMIN — KETOROLAC TROMETHAMINE 15 MG: 30 INJECTION, SOLUTION INTRAMUSCULAR at 15:20

## 2018-12-15 RX ADMIN — FENTANYL CITRATE 50 MCG: 50 INJECTION INTRAMUSCULAR; INTRAVENOUS at 10:39

## 2018-12-15 RX ADMIN — DOCUSATE SODIUM 100 MG: 100 CAPSULE, LIQUID FILLED ORAL at 15:20

## 2018-12-15 RX ADMIN — ONDANSETRON HYDROCHLORIDE 4 MG: 4 INJECTION, SOLUTION INTRAMUSCULAR; INTRAVENOUS at 11:38

## 2018-12-15 RX ADMIN — SENNOSIDES 8.6 MG: 8.6 TABLET, FILM COATED ORAL at 15:22

## 2018-12-15 RX ADMIN — CEFAZOLIN 2000 MG: 1 INJECTION, POWDER, FOR SOLUTION INTRAMUSCULAR; INTRAVENOUS; PARENTERAL at 11:00

## 2018-12-15 RX ADMIN — FENTANYL CITRATE 25 MCG: 50 INJECTION INTRAMUSCULAR; INTRAVENOUS at 10:53

## 2018-12-15 RX ADMIN — SODIUM CHLORIDE, POTASSIUM CHLORIDE, SODIUM LACTATE AND CALCIUM CHLORIDE: 600; 310; 30; 20 INJECTION, SOLUTION INTRAVENOUS at 11:35

## 2018-12-15 RX ADMIN — Medication 0.1 MG: at 10:39

## 2018-12-15 RX ADMIN — ACETAMINOPHEN 500 MG: 500 TABLET, FILM COATED ORAL at 18:04

## 2018-12-15 RX ADMIN — RIVAROXABAN 10 MG: 10 TABLET, FILM COATED ORAL at 21:34

## 2018-12-15 RX ADMIN — Medication 125 MG: at 15:23

## 2018-12-15 RX ADMIN — SODIUM CHLORIDE: 9 INJECTION, SOLUTION INTRAVENOUS at 04:20

## 2018-12-15 ASSESSMENT — PAIN SCALES - GENERAL
PAINLEVEL_OUTOF10: 0
PAINLEVEL_OUTOF10: 3
PAINLEVEL_OUTOF10: 3
PAINLEVEL_OUTOF10: 0
PAINLEVEL_OUTOF10: 3
PAINLEVEL_OUTOF10: 6
PAINLEVEL_OUTOF10: 7
PAINLEVEL_OUTOF10: 0
PAINLEVEL_OUTOF10: 4
PAINLEVEL_OUTOF10: 0

## 2018-12-15 ASSESSMENT — PULMONARY FUNCTION TESTS
PIF_VALUE: 13
PIF_VALUE: 12
PIF_VALUE: 12
PIF_VALUE: 7
PIF_VALUE: 12
PIF_VALUE: 13
PIF_VALUE: 12
PIF_VALUE: 20
PIF_VALUE: 12
PIF_VALUE: 12
PIF_VALUE: 13
PIF_VALUE: 12
PIF_VALUE: 1
PIF_VALUE: 13
PIF_VALUE: 13
PIF_VALUE: 12
PIF_VALUE: 13
PIF_VALUE: 13
PIF_VALUE: 12
PIF_VALUE: 13
PIF_VALUE: 12
PIF_VALUE: 2
PIF_VALUE: 13
PIF_VALUE: 13
PIF_VALUE: 12
PIF_VALUE: 13
PIF_VALUE: 13
PIF_VALUE: 12
PIF_VALUE: 13
PIF_VALUE: 12
PIF_VALUE: 16
PIF_VALUE: 12
PIF_VALUE: 13
PIF_VALUE: 13
PIF_VALUE: 1
PIF_VALUE: 12
PIF_VALUE: 13
PIF_VALUE: 2
PIF_VALUE: 0
PIF_VALUE: 13
PIF_VALUE: 0
PIF_VALUE: 0
PIF_VALUE: 13
PIF_VALUE: 12
PIF_VALUE: 13
PIF_VALUE: 12
PIF_VALUE: 13
PIF_VALUE: 1
PIF_VALUE: 12
PIF_VALUE: 13
PIF_VALUE: 12
PIF_VALUE: 13
PIF_VALUE: 12
PIF_VALUE: 9
PIF_VALUE: 13
PIF_VALUE: 11
PIF_VALUE: 8
PIF_VALUE: 13
PIF_VALUE: 12
PIF_VALUE: 16
PIF_VALUE: 16
PIF_VALUE: 13
PIF_VALUE: 0
PIF_VALUE: 1
PIF_VALUE: 12
PIF_VALUE: 13
PIF_VALUE: 2
PIF_VALUE: 12

## 2018-12-15 ASSESSMENT — PAIN DESCRIPTION - PROGRESSION
CLINICAL_PROGRESSION: NOT CHANGED

## 2018-12-15 ASSESSMENT — PAIN DESCRIPTION - LOCATION: LOCATION: LEG

## 2018-12-15 ASSESSMENT — PAIN DESCRIPTION - ONSET: ONSET: ON-GOING

## 2018-12-15 ASSESSMENT — PAIN DESCRIPTION - ORIENTATION: ORIENTATION: LEFT

## 2018-12-15 ASSESSMENT — PAIN DESCRIPTION - FREQUENCY: FREQUENCY: CONTINUOUS

## 2018-12-15 ASSESSMENT — PAIN DESCRIPTION - DESCRIPTORS: DESCRIPTORS: CONSTANT

## 2018-12-15 ASSESSMENT — PAIN DESCRIPTION - PAIN TYPE: TYPE: SURGICAL PAIN

## 2018-12-15 NOTE — PROGRESS NOTES
Hospitalist Consult Progress Note    Patient:  Roseann Seek      Unit/Bed:7K-08/008-A    YOB: 1932    MRN: [id number]       Acct: [de-identified]     PCP: Daniel Hurt MD    Date of Admission: 12/14/2018    Attending Physician: Yue Tariq MD    Reason for Consult: Medical management      Assessment/Plan:  Active Hospital Problems    Diagnosis Date Noted    Closed displaced intertrochanteric fracture of left femur [facility]) Arely Malhotra 12/14/2018    Type 2 diabetes mellitus ([facility] [address] [E11.9] 12/14/2018       Intertrochanteric Fracture of Proximal Left Femur - s/p fall and missing step, perhaps AF/bradycardia factored into fall (but per review with patient no symptoms of LH, SOB, palpitations). - Ortho Primarily managing  - Cardiology for preop clearance - cleared  - continue current pain control  - bowel regimen - add on senna and colace scheduled  - cont Vit D    Acute Anemia - suspect related to fracture, blood loss and dilutional from fluids. 14.2 on arrival  - 11.1 this am  - will continue to monitor daily    Newly Discovered AF - qprtu0ipib = 4. Risk factors of DM and HTN.   - Cardiology Consulted, appreciate participation  - Follow up echo - no valvular disease or atrial enlargement, normal EF with G1DD.   - TSH wnl  - troponin negative x3  - optimize mg and k  - will need anticoagulation after surgery, as appropriate by Ortho and when hemostasis acheived    Bradycardia - in setting of AF, possible SSS? Also, will check TSH levels.     - EKG confirmed, repeat EKGS with  NSR with 1st degree AVB  - cecilio resolved, no longer in AF  - normal TSH  - avoid AVNB    HTN - controlled  - cont lisinopril-hctz    T2DM - hold metformin, saxagliptin  - med dose SSI  - accuhceck and hypoglycemia protocol  - dm deiet    HLD - cont statin      Expected discharge date:  Per Primary    Disposition: Per Primary  -------------------------------------------------------------    Chief Complaint: Fall and

## 2018-12-16 VITALS
SYSTOLIC BLOOD PRESSURE: 139 MMHG | WEIGHT: 175.9 LBS | HEIGHT: 69 IN | DIASTOLIC BLOOD PRESSURE: 58 MMHG | BODY MASS INDEX: 26.05 KG/M2 | HEART RATE: 82 BPM | TEMPERATURE: 98.2 F | OXYGEN SATURATION: 98 % | RESPIRATION RATE: 16 BRPM

## 2018-12-16 LAB
ANION GAP SERPL CALCULATED.3IONS-SCNC: 7 MEQ/L (ref 8–16)
BUN BLDV-MCNC: 9 MG/DL (ref 7–22)
CALCIUM SERPL-MCNC: 8.1 MG/DL (ref 8.5–10.5)
CHLORIDE BLD-SCNC: 109 MEQ/L (ref 98–111)
CO2: 25 MEQ/L (ref 23–33)
CREAT SERPL-MCNC: 0.7 MG/DL (ref 0.4–1.2)
ERYTHROCYTE [DISTWIDTH] IN BLOOD BY AUTOMATED COUNT: 13.9 % (ref 11.5–14.5)
ERYTHROCYTE [DISTWIDTH] IN BLOOD BY AUTOMATED COUNT: 44.3 FL (ref 35–45)
GFR SERPL CREATININE-BSD FRML MDRD: > 90 ML/MIN/1.73M2
GLUCOSE BLD-MCNC: 142 MG/DL (ref 70–108)
GLUCOSE BLD-MCNC: 145 MG/DL (ref 70–108)
GLUCOSE BLD-MCNC: 265 MG/DL (ref 70–108)
HCT VFR BLD CALC: 25.5 % (ref 42–52)
HCT VFR BLD CALC: 28.3 % (ref 42–52)
HEMOGLOBIN: 8.4 GM/DL (ref 14–18)
HEMOGLOBIN: 9.2 GM/DL (ref 14–18)
MAGNESIUM: 1.9 MG/DL (ref 1.6–2.4)
MCH RBC QN AUTO: 29.1 PG (ref 26–33)
MCHC RBC AUTO-ENTMCNC: 32.9 GM/DL (ref 32.2–35.5)
MCV RBC AUTO: 88.2 FL (ref 80–94)
PLATELET # BLD: 132 THOU/MM3 (ref 130–400)
PMV BLD AUTO: 10.7 FL (ref 9.4–12.4)
POTASSIUM SERPL-SCNC: 4.3 MEQ/L (ref 3.5–5.2)
RBC # BLD: 2.89 MILL/MM3 (ref 4.7–6.1)
SODIUM BLD-SCNC: 141 MEQ/L (ref 135–145)
WBC # BLD: 7.9 THOU/MM3 (ref 4.8–10.8)

## 2018-12-16 PROCEDURE — G8989 SELF CARE D/C STATUS: HCPCS

## 2018-12-16 PROCEDURE — 83735 ASSAY OF MAGNESIUM: CPT

## 2018-12-16 PROCEDURE — 36415 COLL VENOUS BLD VENIPUNCTURE: CPT

## 2018-12-16 PROCEDURE — 2580000003 HC RX 258: Performed by: NURSE PRACTITIONER

## 2018-12-16 PROCEDURE — 6370000000 HC RX 637 (ALT 250 FOR IP): Performed by: PHYSICIAN ASSISTANT

## 2018-12-16 PROCEDURE — 97110 THERAPEUTIC EXERCISES: CPT

## 2018-12-16 PROCEDURE — 6370000000 HC RX 637 (ALT 250 FOR IP): Performed by: INTERNAL MEDICINE

## 2018-12-16 PROCEDURE — G8978 MOBILITY CURRENT STATUS: HCPCS

## 2018-12-16 PROCEDURE — 85014 HEMATOCRIT: CPT

## 2018-12-16 PROCEDURE — G8979 MOBILITY GOAL STATUS: HCPCS

## 2018-12-16 PROCEDURE — G8987 SELF CARE CURRENT STATUS: HCPCS

## 2018-12-16 PROCEDURE — 97165 OT EVAL LOW COMPLEX 30 MIN: CPT

## 2018-12-16 PROCEDURE — 97116 GAIT TRAINING THERAPY: CPT

## 2018-12-16 PROCEDURE — 6370000000 HC RX 637 (ALT 250 FOR IP): Performed by: NURSE PRACTITIONER

## 2018-12-16 PROCEDURE — 80048 BASIC METABOLIC PNL TOTAL CA: CPT

## 2018-12-16 PROCEDURE — 82948 REAGENT STRIP/BLOOD GLUCOSE: CPT

## 2018-12-16 PROCEDURE — 6360000002 HC RX W HCPCS: Performed by: PHYSICIAN ASSISTANT

## 2018-12-16 PROCEDURE — 97161 PT EVAL LOW COMPLEX 20 MIN: CPT

## 2018-12-16 PROCEDURE — 99232 SBSQ HOSP IP/OBS MODERATE 35: CPT | Performed by: INTERNAL MEDICINE

## 2018-12-16 PROCEDURE — 85018 HEMOGLOBIN: CPT

## 2018-12-16 PROCEDURE — 97535 SELF CARE MNGMENT TRAINING: CPT

## 2018-12-16 PROCEDURE — 85027 COMPLETE CBC AUTOMATED: CPT

## 2018-12-16 PROCEDURE — 6370000000 HC RX 637 (ALT 250 FOR IP): Performed by: FAMILY MEDICINE

## 2018-12-16 RX ORDER — ASPIRIN 81 MG/1
81 TABLET, CHEWABLE ORAL DAILY
Qty: 30 TABLET | Refills: 0 | COMMUNITY
Start: 2018-12-16

## 2018-12-16 RX ORDER — TRAMADOL HYDROCHLORIDE 50 MG/1
50 TABLET ORAL EVERY 6 HOURS PRN
Qty: 28 TABLET | Refills: 0 | Status: SHIPPED | OUTPATIENT
Start: 2018-12-16 | End: 2018-12-23

## 2018-12-16 RX ADMIN — VITAMIN D, TAB 1000IU (100/BT) 1000 UNITS: 25 TAB at 10:20

## 2018-12-16 RX ADMIN — KETOROLAC TROMETHAMINE 15 MG: 30 INJECTION, SOLUTION INTRAMUSCULAR at 02:59

## 2018-12-16 RX ADMIN — CALCIUM 500 MG: 500 TABLET ORAL at 10:20

## 2018-12-16 RX ADMIN — ACETAMINOPHEN 500 MG: 500 TABLET, FILM COATED ORAL at 10:20

## 2018-12-16 RX ADMIN — MAGNESIUM HYDROXIDE 30 ML: 400 SUSPENSION ORAL at 10:21

## 2018-12-16 RX ADMIN — Medication 125 MG: at 10:20

## 2018-12-16 RX ADMIN — LISINOPRIL 10 MG: 10 TABLET ORAL at 10:20

## 2018-12-16 RX ADMIN — HYDROCHLOROTHIAZIDE 12.5 MG: 12.5 CAPSULE ORAL at 10:20

## 2018-12-16 RX ADMIN — DOCUSATE SODIUM 100 MG: 100 CAPSULE, LIQUID FILLED ORAL at 10:20

## 2018-12-16 RX ADMIN — SENNOSIDES 8.6 MG: 8.6 TABLET, FILM COATED ORAL at 10:20

## 2018-12-16 RX ADMIN — SODIUM CHLORIDE: 9 INJECTION, SOLUTION INTRAVENOUS at 02:57

## 2018-12-16 RX ADMIN — ACETAMINOPHEN 500 MG: 500 TABLET, FILM COATED ORAL at 05:03

## 2018-12-16 RX ADMIN — KETOROLAC TROMETHAMINE 15 MG: 30 INJECTION, SOLUTION INTRAMUSCULAR at 10:20

## 2018-12-16 ASSESSMENT — PAIN SCALES - GENERAL
PAINLEVEL_OUTOF10: 4
PAINLEVEL_OUTOF10: 3
PAINLEVEL_OUTOF10: 3
PAINLEVEL_OUTOF10: 2
PAINLEVEL_OUTOF10: 0
PAINLEVEL_OUTOF10: 2

## 2018-12-16 ASSESSMENT — PAIN DESCRIPTION - PROGRESSION
CLINICAL_PROGRESSION: NOT CHANGED

## 2018-12-16 ASSESSMENT — PAIN DESCRIPTION - FREQUENCY: FREQUENCY: INTERMITTENT

## 2018-12-16 ASSESSMENT — PAIN DESCRIPTION - PAIN TYPE: TYPE: SURGICAL PAIN

## 2018-12-16 ASSESSMENT — PAIN DESCRIPTION - ORIENTATION: ORIENTATION: LEFT

## 2018-12-16 ASSESSMENT — PAIN DESCRIPTION - DESCRIPTORS: DESCRIPTORS: ACHING;DULL

## 2018-12-16 ASSESSMENT — PAIN DESCRIPTION - LOCATION: LOCATION: LEG

## 2018-12-16 NOTE — PROGRESS NOTES
Yes  Pain Assessment: 0-10  Pain Level: 4  Pain Type: Surgical pain  Pain Location: Leg  Pain Orientation: Left  Pain Descriptors: Aching;Dull  Pain Frequency: Intermittent  Clinical Progression: Not changed  Patient's Stated Pain Goal: No pain  Effect of Pain on Daily Activities: Difficulty walking or shifting his weight  Pain Intervention(s): Repositioned; Rest  Response to Pain Intervention: Patient Satisfied  Multiple Pain Sites: No       Social/Functional History:  Lives With: Spouse  Type of Home: House  Home Layout: Two level, Bed/Bath upstairs, Able to Live on Main level with bedroom/bathroom, Laundry in basement  Home Access: Stairs to enter without rails  Entrance Stairs - Number of Steps: 1+1  Entrance Stairs - Rails: None  Home Equipment:  (no AD used PTA, none at home)     Bathroom Shower/Tub: Tub/Shower unit  Bathroom Toilet: Standard  Bathroom Accessibility: Accessible  IADL Comments: Pt was Independent with self care. 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 Leisure & Hobbies: Patient walked 2-3 miles per day and performed seated chair exercise video daily. Additional Comments: PTA, patient was very active and I with all ADLs and ambulation. Pt had a rolling walker issued at this time.     Objective  Vision - Basic Assessment  Prior Vision: Wears glasses all the time     Overall Cognitive Status: Exceptions  Safety Judgement: the result of analysis of data from a problem focused assessment, a consideration of a limited number of treatment options, no significant comorbidities affecting the plan of care and no modification or assistance required to complete the evaluation. Discharge Recommendations:  Discharge Recommendations: Home with assist PRN    Patient Education:  Patient Education: OT POC; pt's goal; AE options for lower body ADLs    Equipment Recommendations:  Equipment Needed: Yes  Mobility Devices: ADL Assistive Devices  ADL Assistive Devices: Reacher    Safety:  Safety Devices in place: Yes  Type of devices: Patient at risk for falls, Gait belt, Left in chair, Chair alarm in place, Call light within reach, Nurse notified    Plan:  Times per week: Not applicable  Plan Comment: Pt would be able to return home with spouse assisting him as needed when medically stable. Specific instructions for Next Treatment: Not applicable    Goals:  Patient goals : \"I want to be returning home and being active again as soon as possible. \" pt states. Short term goals  Time Frame for Short term goals: Not applicable  Long term goals  Time Frame for Long term goals : None secondary to short estimated length of stay. Evaluation Complexity: Based on the findings of patient history, examination, clinical presentation, and decision making during this evaluation, this patient is of low complexity. OT G-codes  Functional Limitation: Self care  Self Care Current Status (): At least 20 percent but less than 40 percent impaired, limited or restricted  Self Care Discharge Status ():  At least 20 percent but less than 40 percent impaired, limited or restricted  AM-[facility] Inpatient Daily Activity Raw Score: 22  AM-[facility] Inpatient ADL T-Scale Score : 47.1  ADL Inpatient CMS 0-100% Score: 25.8  ADL Inpatient CMS G-Code Modifier : SHIMON

## 2018-12-16 NOTE — PROGRESS NOTES
Pt requires the assistance of a wheeled walker to successfully complete daily living tasks such as : toileting, bathing, dressing, and grooming. A wheeled walker is necessary due to the pt's unsteady gait, upper body weakness, inability to  an ambulation device and can ambulate only by pushing a walker instead of a lesser assistive device such as a cane, crutch or standard walker.

## 2018-12-16 NOTE — PROGRESS NOTES
Attempted to put chen hose on pt and he refused states he moves enough. Informed to continue to do normal therapy exercises at home as advised.

## 2018-12-16 NOTE — PROGRESS NOTES
electronically signed by Dr. Chace Shetty on 12/14/2018 11:43 AM      CT Head WO Contrast   Final Result    No evidence of an acute process. **This report has been created using voice recognition software. It may contain minor errors which are inherent in voice recognition technology. **      Final report electronically signed by Dr. Chace Shetty on 12/14/2018 11:41 AM      XR PELVIS (1-2 VIEWS)   Final Result   1. No pelvic fracture. 2. Intertrochanteric fracture of the proximal left femur. Final report electronically signed by Dr. Barnett Officer on 12/14/2018 11:25 AM      XR FEMUR LEFT (MIN 2 VIEWS)   Final Result      Intertrochanteric fracture of the proximal femur.       Final report electronically signed by Dr. Barnett Officer on 12/14/2018 11:23 AM          DVT prophylaxis: [] Lovenox                                 [] SCDs                                 [] SQ Heparin                                 [] Encourage ambulation           [x] Already on Anticoagulation     Code Status: Full Code    PT/OT Eval Status: ongoing    Tele:   [x] yes             [] no    Active Hospital Problems    Diagnosis Date Noted    Closed displaced intertrochanteric fracture of left femur ([address] Deadra Carbo 12/14/2018    Type 2 diabetes mellitus ([address] [E11.9] 12/14/2018       Electronically signed by Rola Justin MD on 12/16/2018 at 11:13 AM

## 2018-12-16 NOTE — PLAN OF CARE
Problem: Risk for Impaired Skin Integrity  Goal: Tissue integrity - skin and mucous membranes  Structural intactness and normal physiological function of skin and  mucous membranes. Outcome: Ongoing  Dressing is dry and intact . No other skin impairments noted. Pt understands the importance of frequent repositioning in order to prevent any skin breakdown. Pt being repositioned every two hours. Problem: Pain:  Goal: Pain level will decrease  Pain level will decrease   Outcome: Ongoing  Pt instructed and educated about pharmacological and non pharmacological pain relief strategies. Problem: Neurological  Goal: Maximum potential motor/sensory/cognitive function  Outcome: Ongoing  Pt alert and oriented. Pt has n/t occasionally with movement on his LLE. Moderate hand  amd weal pedal push and pull bilaterally. Problem: Cardiovascular  Goal: No DVT, peripheral vascular complications  Outcome: Ongoing  Pt without s/s of DVT. Pt able to take prescribed anticoagulants in place to help prevent development of DVT. Problem: Respiratory  Goal: No pulmonary complications  Outcome: Ongoing  Pt sats 95%  on 1 LO2 per nasal cannula. No shortness of breath noted. Lungs clear throughout. Problem:   Goal: Adequate urinary output  Outcome: Ongoing  Alexandra catheter in place. Pt voiding adequate amounts without difficulty. Comments: Care plan reviewed with patient. Patient verbalizes understanding of the plan of care and contributes to goal setting.

## 2018-12-16 NOTE — OP NOTE
OPERATIVE REPORT    Date of Surgery: 12/15/18      Patient: Clifton Snellen  MRN: [id number]    Pre-Operative Diagnosis: Displaced right intertrochanteric hip fracture  Post-Operative Diagnosis: Displaced right intertrochanteric hip fracture  Procedure: Open reduction internal fixation with nail right intertrochanteric hip fracture    Surgeon: Joselito Joseph MD    Assistant: NAZARIO Hicks. Umm's assistance was necessary for patient positioning, prepping, draping, retraction, fracture reduction and skin closure. Her presence improved operative efficiency and the surgical outcome for this patient. Anesthesia: General    EBL: 100 cc    Implants: Smith and Nephew Intertan 13 mm x 400 mm. 95 mm lag compression screw    Complications: None    Disposition: Stable to PACU    Indications: Clifton Snellen is a 80 y.o. male who fell from ground level 12/14/18. The patient was evaluated in the ER and a right intertrochanteric hip fracture was identified. Operative intervention with intramedullary nailing of the right intertrochanteric femur fracture was recommended. The risks, benefits, alternatives of the procedure including but not limited to pain, bleeding, infection, non-union, blood clots, wound complications, need for further surgery were reviewed and informed consent was obtained. Procedure: After identification and marking in the pre-operative holding area, the patient was brought to the operating room and underwent smooth induction of general anesthesia. The patient was then placed on the fracture table in supine position. All bony prominences were well padded. The patient received 2 g ancef for pre-operative surgical site infection prophylaxis. Time out was performed confirming correct patient, site and surgery. Fluoroscopy was utilized and manual traction with internal rotation was applied resulting in reduction of the intertrochanteric hip fracture.  The leg was prepped and draped in sterile
sterile fashion. A 4 cm incision was made through skin and IT band along the lateral thigh. Guide pin were then placed at the medial aspect of the greater trochanter and centered down the femoral shaft and directed to the less tuberosity. Location was confirmed with biplanar fluoroscopy. An opening reamer was then utilized. Ball tipped guide wire was then passed and femoral nail length was measured. We sequentially reamed the femoral canal to 14.5 mm. A 13 mm x 400 mm Smith and Nephew Intertan nail was then inserted into the canal. Reduction was maintained. Guide pins were then placed in the center of the femoral neck and a lag screw measuring 95 mm and compression screw were then placed. The compression screw provided excellent compression and fracture reduction. The nail inserted and guides were removed and final hip x-rays were obtained. Attention was then turned to placement of a single distal interlocking screw using perfect [place] technique. After drilling, a 42.5 mm 5.0 mm screw was placed in the distal interlocking screw hole. Final x-rays were obtained.      All wounds were then thoroughly irrigated. The fascia was closed with 2-0 vicryl sutures. The subcutaneous tissues were closed with 3-0 monocryl and skin was closed with 3-0 nylon sutures. Sterile dressings were applied.     At the conclusion of the procedure all needle and sponge counts were correct.     The patient was transferred to the PACU in stable condition.     Post-Operative Plan: The patient will be weightbearing as tolerated on the left hip with walker and will receive xarelto for 4 weeks for DVT prophylaxis. We will see them back in office in 2 weeks.

## 2018-12-16 NOTE — PROGRESS NOTES
perform without cueing)       Ambulation 1  Surface: level tile  Device: Rolling Walker  Assistance: Contact guard assistance  Quality of Gait: shortened step length on R, decreased stance time on L, occasionally utilizing UEs to assist with offloading LLE  Distance: 25x1, 15x1, 10x1  Comments: Patient able to perform room ambulation, then rest and perform ambulation to w/c placed in center of room to transfer for step trialing and then back to bedside chair at completion of session    Stairs  # Steps : 4  Stairs Height: 6\"  Rails: Bilateral  Assistance: Contact guard assistance  Comment: VCs required for stepping pattern, patient performed with step to pattern     Exercises:  Comments: ankle pumps, heel slides, hip abduction, LAQ, quad sets x10 bilaterally for improved LE strength for functional mobility. Activity Tolerance:  Activity Tolerance: Patient Tolerated treatment well    Treatment Initiated: see above, additional gait training     Assessment: Body structures, Functions, Activity limitations: Decreased functional mobility , Decreased strength, Decreased endurance, Decreased balance  Assessment: Patient tolerated session well. Patient required initial safety cueing for transfers, ambulation and stair negotiation but able to perform all subsequent mobility without cues required. With repetition, the patient demonstrated improved confidence with mobility and was able to perform mobility at [place] [address]. Patient able to negotiate 1 step required to enter home from garage at end of session at [place] [address] as well. Discharge planning discussed with patient and wife with patient to performed continued physical therapy in order to return him to his active PLOF. Prognosis: Excellent    Clinical Presentation: Low - Stable and Uncomplicated: Patient tolerated session well. Patient required initial safety cueing for transfers, ambulation and stair negotiation but able to perform all subsequent mobility without cues required.

## 2018-12-16 NOTE — PROGRESS NOTES
[address] [id number]  Dept: [phone]  Loc: [phone]        Adult Orthopaedic Service      Patient Name:  Jenny Gtz  YOB: 1932   MRN:  [id number]   Date: 12/16/18          Assessment:    s/p  left Open reduction internal fixation with nail left intertrochanteric hip fracture  on 12/15/18 doing well    Plan:   -DVT Prophylaxis  -Finish Post op Antibiotics  -PT/OT   -WBAT left LE  -Change dressing today  -OK for discharge to home today from Ortho [facility]    Subjective:   Pain controlled, tolerating diet, moving well, Anxious to return home    Medications:      calcium elemental  500 mg Oral Daily    Magnesium Oxide  125 mg Oral Daily    simvastatin  20 mg Oral Nightly    vitamin D  1,000 Units Oral Daily    lisinopril  10 mg Oral Daily    And    hydrochlorothiazide  12.5 mg Oral Daily    senna  1 tablet Oral BID    docusate sodium  100 mg Oral BID    rivaroxaban  10 mg Oral Daily    ketorolac  15 mg Intravenous Q6H    insulin lispro  0-12 Units Subcutaneous TID WC    insulin lispro  0-6 Units Subcutaneous Nightly    sodium chloride flush  10 mL Intravenous 2 times per day    acetaminophen  500 mg Oral Q6H       Physical Exam:     Vitals:    12/16/18 1014   BP: (!) 139/58   Pulse: 82   Resp: 16   Temp: 98.2 °F (36.8 °C)   SpO2: 98%       Intake and Output Summary (Last 24 hours) at Date Time    Intake/Output Summary (Last 24 hours) at 12/16/18 1110  Last data filed at 12/16/18 6778   Gross per 24 hour   Intake             3798 ml   Output             1225 ml   Net             2573 ml       General appearance - no acute distress  Musculoskeletal -  Dressing/Splint C/D/I proximal 2 incisions with minimal bloody drainage  Fires TA/EHL/GSC  SILT SP/DP/TN  WWP distally  Calves soft, nontender bilateral    Labs:     CBC:   Lab Results   Component Value Date    WBC 7.9 12/16/2018    RBC 2.89 12/16/2018 HGB 8.4 12/16/2018    HCT 25.5 12/16/2018    MCV 88.2 12/16/2018    MCH 29.1 12/16/2018    MCHC 32.9 12/16/2018     12/16/2018    MPV 10.7 12/16/2018     BMP:    Lab Results   Component Value Date     12/16/2018    K 4.3 12/16/2018     12/16/2018    CO2 25 12/16/2018    BUN 9 12/16/2018    CREATININE 0.7 12/16/2018    CALCIUM 8.1 12/16/2018    LABGLOM >90 12/16/2018    GLUCOSE 145 12/16/2018     PT/INR:  No results found for: PROTIME, INR  Rads:   Radiological Procedure reviewed.   Signed by: Arya Reyes

## 2018-12-17 ENCOUNTER — CARE COORDINATION (OUTPATIENT)
Dept: CASE MANAGEMENT | Age: 83
End: 2018-12-17

## 2018-12-17 RX ORDER — TRAMADOL HYDROCHLORIDE 50 MG/1
50 TABLET ORAL EVERY 6 HOURS PRN
COMMUNITY

## 2018-12-17 NOTE — CARE COORDINATION
[facility] Transitions Initial Follow Up Call    Call within 2 business days of discharge: Yes    Patient: Roseann Whalen Patient : 1932   MRN: [id number]  Reason for Admission: There are no discharge diagnoses documented for the most recent discharge. Discharge Date: 18 RARS: Readmission Risk Score: 10     Spoke with: Libia Reed and wife Greta. Introduced self/role and BPCI-A program. Patient stated he is not to have dressing changed for 7 days so he does not know what incision looks like at this moment. Pain is a 1 when he sits and 7 when he walks. He as not taken any pain medication at this time. Discussed that patient does not have PT set up. Contact [email or web address] and left a message to contact [facility] at [phone] regarding patient's PT with Dr Faustin Public nurse voicemail. Reviewed medications. Patient states he has not other concerns or issues at this time. Will continue to follow until this issue is finalized. Facility: [facility]      Care Transitions 24 Hour Call    Do you have all of your prescriptions and are they filled?:  Yes  Care Transitions Interventions         Follow Up  No future appointments.     Tiffanie Correa RN

## 2019-01-09 ENCOUNTER — CARE COORDINATION (OUTPATIENT)
Dept: CARE COORDINATION | Age: 84
End: 2019-01-09

## 2019-01-24 ENCOUNTER — CARE COORDINATION (OUTPATIENT)
Dept: CARE COORDINATION | Age: 84
End: 2019-01-24

## 2019-02-08 ENCOUNTER — CARE COORDINATION (OUTPATIENT)
Dept: CARE COORDINATION | Age: 84
End: 2019-02-08

## 2019-02-21 ENCOUNTER — CARE COORDINATION (OUTPATIENT)
Dept: CASE MANAGEMENT | Age: 84
End: 2019-02-21

## 2019-03-12 ENCOUNTER — CARE COORDINATION (OUTPATIENT)
Dept: CARE COORDINATION | Age: 84
End: 2019-03-12

## 2021-01-19 ENCOUNTER — HOSPITAL ENCOUNTER (OUTPATIENT)
Dept: CT IMAGING | Age: 86
Discharge: HOME | End: 2021-01-19
Payer: MEDICARE

## 2021-01-19 DIAGNOSIS — F03.90 SENILE DEMENTIA, UNCOMPLICATED (HCC): ICD-10-CM

## 2021-01-19 PROCEDURE — 70450 CT HEAD/BRAIN W/O DYE: CPT

## 2024-01-07 ENCOUNTER — APPOINTMENT (OUTPATIENT)
Dept: CT IMAGING | Age: 89
DRG: 813 | End: 2024-01-07
Payer: MEDICARE

## 2024-01-07 ENCOUNTER — HOSPITAL ENCOUNTER (INPATIENT)
Age: 89
LOS: 4 days | Discharge: NURSING, REHAB OR LONG-TERM CARE | DRG: 813 | End: 2024-01-11
Attending: EMERGENCY MEDICINE | Admitting: SURGERY
Payer: MEDICARE

## 2024-01-07 DIAGNOSIS — R79.1 SUPRATHERAPEUTIC INR: ICD-10-CM

## 2024-01-07 DIAGNOSIS — I48.21 PERMANENT ATRIAL FIBRILLATION (HCC): ICD-10-CM

## 2024-01-07 DIAGNOSIS — I62.00 SUBDURAL HEMORRHAGE (HCC): ICD-10-CM

## 2024-01-07 DIAGNOSIS — R00.1 BRADYCARDIA: ICD-10-CM

## 2024-01-07 DIAGNOSIS — I48.0 PAROXYSMAL ATRIAL FIBRILLATION (HCC): Primary | ICD-10-CM

## 2024-01-07 PROBLEM — S06.5XAA SDH (SUBDURAL HEMATOMA) (HCC): Status: ACTIVE | Noted: 2024-01-07

## 2024-01-07 PROBLEM — I10 ESSENTIAL HYPERTENSION, BENIGN: Status: ACTIVE | Noted: 2024-01-07
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
## 2024-01-07 LAB
ABO: NORMAL
ANION GAP SERPL CALC-SCNC: 12 MEQ/L (ref 8–16)
ANTIBODY SCREEN: NORMAL
APTT PPP: 37.7 SECONDS (ref 22–38)
BASOPHILS ABSOLUTE: 0 THOU/MM3 (ref 0–0.1)
BASOPHILS NFR BLD AUTO: 0.2 %
BUN SERPL-MCNC: 10 MG/DL (ref 7–22)
CA-I BLD ISE-SCNC: 1.05 MMOL/L (ref 1.12–1.32)
CALCIUM SERPL-MCNC: 9.7 MG/DL (ref 8.5–10.5)
CHLORIDE SERPL-SCNC: 100 MEQ/L (ref 98–111)
CO2 SERPL-SCNC: 27 MEQ/L (ref 23–33)
CREAT SERPL-MCNC: 0.9 MG/DL (ref 0.4–1.2)
DEPRECATED RDW RBC AUTO: 46.7 FL (ref 35–45)
EOSINOPHIL NFR BLD AUTO: 2.7 %
EOSINOPHILS ABSOLUTE: 0.2 THOU/MM3 (ref 0–0.4)
ERYTHROCYTE [DISTWIDTH] IN BLOOD BY AUTOMATED COUNT: 14 % (ref 11.5–14.5)
GFR SERPL CREATININE-BSD FRML MDRD: > 60 ML/MIN/1.73M2
GLUCOSE BLD STRIP.AUTO-MCNC: 152 MG/DL (ref 70–108)
GLUCOSE BLD STRIP.AUTO-MCNC: 159 MG/DL (ref 70–108)
GLUCOSE SERPL-MCNC: 127 MG/DL (ref 70–108)
HCT VFR BLD AUTO: 48.4 % (ref 42–52)
HGB BLD-MCNC: 15.8 GM/DL (ref 14–18)
IMM GRANULOCYTES # BLD AUTO: 0.02 THOU/MM3 (ref 0–0.07)
IMM GRANULOCYTES NFR BLD AUTO: 0.2 %
INR PPP: 1.58 (ref 0.85–1.13)
INR PPP: 10.03 (ref 0.85–1.13)
LYMPHOCYTES ABSOLUTE: 3.8 THOU/MM3 (ref 1–4.8)
LYMPHOCYTES NFR BLD AUTO: 44.1 %
MCH RBC QN AUTO: 29.6 PG (ref 26–33)
MCHC RBC AUTO-ENTMCNC: 32.6 GM/DL (ref 32.2–35.5)
MCV RBC AUTO: 90.8 FL (ref 80–94)
MONOCYTES ABSOLUTE: 0.7 THOU/MM3 (ref 0.4–1.3)
MONOCYTES NFR BLD AUTO: 8.2 %
MRSA DNA SPEC QL NAA+PROBE: NEGATIVE
NEUTROPHILS NFR BLD AUTO: 44.6 %
NRBC BLD AUTO-RTO: 0 /100 WBC
OSMOLALITY SERPL CALC.SUM OF ELEC: 278.2 MOSMOL/KG (ref 275–300)
PLATELET # BLD AUTO: 193 THOU/MM3 (ref 130–400)
PMV BLD AUTO: 10.2 FL (ref 9.4–12.4)
POTASSIUM SERPL-SCNC: 3.9 MEQ/L (ref 3.5–5.2)
RBC # BLD AUTO: 5.33 MILL/MM3 (ref 4.7–6.1)
RH FACTOR: NORMAL
SEGMENTED NEUTROPHILS ABSOLUTE COUNT: 3.9 THOU/MM3 (ref 1.8–7.7)
SODIUM SERPL-SCNC: 139 MEQ/L (ref 135–145)
WBC # BLD AUTO: 8.7 THOU/MM3 (ref 4.8–10.8)

## 2024-01-07 PROCEDURE — 87070 CULTURE OTHR SPECIMN AEROBIC: CPT

## 2024-01-07 PROCEDURE — 70450 CT HEAD/BRAIN W/O DYE: CPT

## 2024-01-07 PROCEDURE — 85730 THROMBOPLASTIN TIME PARTIAL: CPT

## 2024-01-07 PROCEDURE — 6370000000 HC RX 637 (ALT 250 FOR IP): Performed by: PHYSICIAN ASSISTANT

## 2024-01-07 PROCEDURE — 2580000003 HC RX 258: Performed by: STUDENT IN AN ORGANIZED HEALTH CARE EDUCATION/TRAINING PROGRAM

## 2024-01-07 PROCEDURE — 99291 CRITICAL CARE FIRST HOUR: CPT | Performed by: NEUROLOGICAL SURGERY

## 2024-01-07 PROCEDURE — 6360000002 HC RX W HCPCS: Performed by: STUDENT IN AN ORGANIZED HEALTH CARE EDUCATION/TRAINING PROGRAM

## 2024-01-07 PROCEDURE — 6370000000 HC RX 637 (ALT 250 FOR IP): Performed by: STUDENT IN AN ORGANIZED HEALTH CARE EDUCATION/TRAINING PROGRAM

## 2024-01-07 PROCEDURE — 80048 BASIC METABOLIC PNL TOTAL CA: CPT

## 2024-01-07 PROCEDURE — 87641 MR-STAPH DNA AMP PROBE: CPT

## 2024-01-07 PROCEDURE — P9017 PLASMA 1 DONOR FRZ W/IN 8 HR: HCPCS

## 2024-01-07 PROCEDURE — 86901 BLOOD TYPING SEROLOGIC RH(D): CPT

## 2024-01-07 PROCEDURE — 82948 REAGENT STRIP/BLOOD GLUCOSE: CPT

## 2024-01-07 PROCEDURE — 99223 1ST HOSP IP/OBS HIGH 75: CPT | Performed by: SURGERY

## 2024-01-07 PROCEDURE — 30233K1 TRANSFUSION OF NONAUTOLOGOUS FROZEN PLASMA INTO PERIPHERAL VEIN, PERCUTANEOUS APPROACH: ICD-10-PCS | Performed by: SURGERY

## 2024-01-07 PROCEDURE — 86900 BLOOD TYPING SEROLOGIC ABO: CPT

## 2024-01-07 PROCEDURE — 6360000002 HC RX W HCPCS: Performed by: EMERGENCY MEDICINE

## 2024-01-07 PROCEDURE — 86850 RBC ANTIBODY SCREEN: CPT

## 2024-01-07 PROCEDURE — 2580000003 HC RX 258: Performed by: EMERGENCY MEDICINE

## 2024-01-07 PROCEDURE — 36430 TRANSFUSION BLD/BLD COMPNT: CPT

## 2024-01-07 PROCEDURE — 85025 COMPLETE CBC W/AUTO DIFF WBC: CPT

## 2024-01-07 PROCEDURE — 99223 1ST HOSP IP/OBS HIGH 75: CPT | Performed by: INTERNAL MEDICINE

## 2024-01-07 PROCEDURE — 36415 COLL VENOUS BLD VENIPUNCTURE: CPT

## 2024-01-07 PROCEDURE — 30283B1 TRANSFUSION OF NONAUTOLOGOUS 4-FACTOR PROTHROMBIN COMPLEX CONCENTRATE INTO VEIN, PERCUTANEOUS APPROACH: ICD-10-PCS | Performed by: SURGERY

## 2024-01-07 PROCEDURE — 94761 N-INVAS EAR/PLS OXIMETRY MLT: CPT

## 2024-01-07 PROCEDURE — 99285 EMERGENCY DEPT VISIT HI MDM: CPT

## 2024-01-07 PROCEDURE — 85610 PROTHROMBIN TIME: CPT

## 2024-01-07 PROCEDURE — 2000000000 HC ICU R&B

## 2024-01-07 PROCEDURE — 72125 CT NECK SPINE W/O DYE: CPT

## 2024-01-07 PROCEDURE — 82330 ASSAY OF CALCIUM: CPT

## 2024-01-07 RX ORDER — ATORVASTATIN CALCIUM 10 MG/1
10 TABLET, FILM COATED ORAL DAILY
Status: DISCONTINUED | OUTPATIENT
Start: 2024-01-07 | End: 2024-01-11 | Stop reason: HOSPADM

## 2024-01-07 RX ORDER — ONDANSETRON 4 MG/1
4 TABLET, ORALLY DISINTEGRATING ORAL EVERY 8 HOURS PRN
Status: DISCONTINUED | OUTPATIENT
Start: 2024-01-07 | End: 2024-01-11 | Stop reason: HOSPADM

## 2024-01-07 RX ORDER — IBUPROFEN 600 MG/1
1 TABLET ORAL PRN
Status: DISCONTINUED | OUTPATIENT
Start: 2024-01-07 | End: 2024-01-11 | Stop reason: HOSPADM

## 2024-01-07 RX ORDER — DEXTROSE MONOHYDRATE 100 MG/ML
INJECTION, SOLUTION INTRAVENOUS CONTINUOUS PRN
Status: DISCONTINUED | OUTPATIENT
Start: 2024-01-07 | End: 2024-01-11 | Stop reason: HOSPADM

## 2024-01-07 RX ORDER — MAGNESIUM SULFATE IN WATER 40 MG/ML
2000 INJECTION, SOLUTION INTRAVENOUS PRN
Status: DISCONTINUED | OUTPATIENT
Start: 2024-01-07 | End: 2024-01-11 | Stop reason: HOSPADM

## 2024-01-07 RX ORDER — SODIUM CHLORIDE 0.9 % (FLUSH) 0.9 %
5-40 SYRINGE (ML) INJECTION PRN
Status: DISCONTINUED | OUTPATIENT
Start: 2024-01-07 | End: 2024-01-07 | Stop reason: SDUPTHER

## 2024-01-07 RX ORDER — ONDANSETRON 2 MG/ML
4 INJECTION INTRAMUSCULAR; INTRAVENOUS EVERY 6 HOURS PRN
Status: DISCONTINUED | OUTPATIENT
Start: 2024-01-07 | End: 2024-01-11 | Stop reason: HOSPADM

## 2024-01-07 RX ORDER — INSULIN LISPRO 100 [IU]/ML
0-4 INJECTION, SOLUTION INTRAVENOUS; SUBCUTANEOUS NIGHTLY
Status: DISCONTINUED | OUTPATIENT
Start: 2024-01-07 | End: 2024-01-08

## 2024-01-07 RX ORDER — INSULIN LISPRO 100 [IU]/ML
0-8 INJECTION, SOLUTION INTRAVENOUS; SUBCUTANEOUS
Status: DISCONTINUED | OUTPATIENT
Start: 2024-01-07 | End: 2024-01-08

## 2024-01-07 RX ORDER — POTASSIUM CHLORIDE 7.45 MG/ML
10 INJECTION INTRAVENOUS PRN
Status: DISCONTINUED | OUTPATIENT
Start: 2024-01-07 | End: 2024-01-11 | Stop reason: HOSPADM

## 2024-01-07 RX ORDER — TRANEXAMIC ACID 10 MG/ML
1000 INJECTION, SOLUTION INTRAVENOUS ONCE
Status: DISCONTINUED | OUTPATIENT
Start: 2024-01-07 | End: 2024-01-07

## 2024-01-07 RX ORDER — POLYETHYLENE GLYCOL 3350 17 G/17G
17 POWDER, FOR SOLUTION ORAL DAILY PRN
Status: DISCONTINUED | OUTPATIENT
Start: 2024-01-07 | End: 2024-01-11 | Stop reason: HOSPADM

## 2024-01-07 RX ORDER — ACETAMINOPHEN 650 MG/1
650 SUPPOSITORY RECTAL EVERY 6 HOURS PRN
Status: DISCONTINUED | OUTPATIENT
Start: 2024-01-07 | End: 2024-01-11 | Stop reason: HOSPADM

## 2024-01-07 RX ORDER — SODIUM CHLORIDE 9 MG/ML
INJECTION, SOLUTION INTRAVENOUS PRN
Status: DISCONTINUED | OUTPATIENT
Start: 2024-01-07 | End: 2024-01-11 | Stop reason: HOSPADM

## 2024-01-07 RX ORDER — SODIUM CHLORIDE 9 MG/ML
INJECTION, SOLUTION INTRAVENOUS CONTINUOUS
Status: DISCONTINUED | OUTPATIENT
Start: 2024-01-07 | End: 2024-01-07

## 2024-01-07 RX ORDER — POTASSIUM CHLORIDE 29.8 MG/ML
20 INJECTION INTRAVENOUS PRN
Status: DISCONTINUED | OUTPATIENT
Start: 2024-01-07 | End: 2024-01-11 | Stop reason: HOSPADM

## 2024-01-07 RX ORDER — SODIUM CHLORIDE 9 MG/ML
50 INJECTION, SOLUTION INTRAVENOUS ONCE
Status: COMPLETED | OUTPATIENT
Start: 2024-01-07 | End: 2024-01-07

## 2024-01-07 RX ORDER — SODIUM CHLORIDE 0.9 % (FLUSH) 0.9 %
5-40 SYRINGE (ML) INJECTION PRN
Status: DISCONTINUED | OUTPATIENT
Start: 2024-01-07 | End: 2024-01-11 | Stop reason: HOSPADM

## 2024-01-07 RX ORDER — SODIUM CHLORIDE 9 MG/ML
INJECTION, SOLUTION INTRAVENOUS PRN
Status: DISCONTINUED | OUTPATIENT
Start: 2024-01-07 | End: 2024-01-07 | Stop reason: SDUPTHER

## 2024-01-07 RX ORDER — LEVETIRACETAM 500 MG/1
500 TABLET ORAL 2 TIMES DAILY
Status: DISCONTINUED | OUTPATIENT
Start: 2024-01-07 | End: 2024-01-11 | Stop reason: HOSPADM

## 2024-01-07 RX ORDER — ACETAMINOPHEN 325 MG/1
650 TABLET ORAL EVERY 6 HOURS PRN
Status: DISCONTINUED | OUTPATIENT
Start: 2024-01-07 | End: 2024-01-11 | Stop reason: HOSPADM

## 2024-01-07 RX ORDER — SODIUM CHLORIDE 0.9 % (FLUSH) 0.9 %
5-40 SYRINGE (ML) INJECTION EVERY 12 HOURS SCHEDULED
Status: DISCONTINUED | OUTPATIENT
Start: 2024-01-07 | End: 2024-01-11 | Stop reason: HOSPADM

## 2024-01-07 RX ORDER — SODIUM CHLORIDE 0.9 % (FLUSH) 0.9 %
5-40 SYRINGE (ML) INJECTION EVERY 12 HOURS SCHEDULED
Status: DISCONTINUED | OUTPATIENT
Start: 2024-01-07 | End: 2024-01-07 | Stop reason: SDUPTHER

## 2024-01-07 RX ORDER — POLYETHYLENE GLYCOL 3350 17 G/17G
17 POWDER, FOR SOLUTION ORAL DAILY
Status: DISCONTINUED | OUTPATIENT
Start: 2024-01-07 | End: 2024-01-11 | Stop reason: HOSPADM

## 2024-01-07 RX ADMIN — ATORVASTATIN CALCIUM 10 MG: 10 TABLET, FILM COATED ORAL at 18:13

## 2024-01-07 RX ADMIN — SODIUM CHLORIDE 2.5 MG/HR: 9 INJECTION, SOLUTION INTRAVENOUS at 18:37

## 2024-01-07 RX ADMIN — LEVETIRACETAM 500 MG: 500 TABLET, FILM COATED ORAL at 23:33

## 2024-01-07 RX ADMIN — SODIUM CHLORIDE 50 ML: 9 INJECTION, SOLUTION INTRAVENOUS at 16:04

## 2024-01-07 RX ADMIN — SODIUM CHLORIDE 5 MG/HR: 9 INJECTION, SOLUTION INTRAVENOUS at 14:48

## 2024-01-07 RX ADMIN — PROTHROMBIN, COAGULATION FACTOR VII HUMAN, COAGULATION FACTOR IX HUMAN, COAGULATION FACTOR X HUMAN, PROTEIN C, PROTEIN S HUMAN, AND WATER 2000 UNITS: KIT at 15:51

## 2024-01-07 ASSESSMENT — ENCOUNTER SYMPTOMS
NAUSEA: 0
APNEA: 0
FACIAL SWELLING: 1
BACK PAIN: 0
VOMITING: 0
EYE DISCHARGE: 0
ABDOMINAL PAIN: 0
SHORTNESS OF BREATH: 0
ABDOMINAL DISTENTION: 0

## 2024-01-07 NOTE — ED NOTES
Assumed care at this time. Received report from Guillermina FELIPE. Pt resting in bed alert and stable at this time. Trauma NP at bedside assessing pt. Pt stable at this time

## 2024-01-07 NOTE — H&P
CRITICAL CARE Consult NOTE      Patient:  Romario Marie    Unit/Bed:4D-07/007-A  YOB: 1932  MRN: [id number]   PCP: Steve Santoro MD  Date of Admission: 1/7/2024  Chief Complaint:-Fall    Assessment and Plan:    Subdural hematoma: Secondary to mechanical fall.  In the setting of supratherapeutic INR.  Presented trauma alert.  CT head with moderate focal hematoma left frontal/parietal region and small subdural hematoma left temporal/parietal region with thickness 7 mm, mixed density and no midline shift.  Initial INR 10.03, s/p Kcentra x 1 and FFP.  Neurosurgery and trauma following.  Neurochecks every 4 hours, NIHSS every shift.  Started on Keppra 500 mg p.o. twice daily.  Fall and seizure precautions in place.  Started on Cardene gtt. with SBP goal 100-140.  Repeat CT head and INR in AM.  Check stat CT head if neurologic status or worsening headache.  Paroxysmal atrial fibrillation: [id number].  On Coumadin for anticoagulation, no rate or rhythm control agents.  Initial INR supratherapeutic in setting of bleed, see above.  Hold anticoagulation.  SCD placed.  On Cardene gtt. for BP control.  Placed on continuous telemetry.  HTN: Home antihypertensive regimen includes lisinopril-hydrochlorothiazide.  Started on Cardene gtt. for optimal BP control.  Monitor BP.  HLD: Started Lipitor 10 mg p.o. daily  NIDDMII: Last HgbA1c unknown, will order..  Home antidiabetic regimen includes saxagliptin and metformin.  Started on medium dose SSI scale with Accu-Cheks.  Monitor blood glucose with daily BMP.  Adjust insulin accordingly.  Hypoglycemia protocols initiated.  History of prostate cancer: Per chart review  History of dementia: Baseline mentation AOx3, forgetful of factual details.  CODE STATUS: Discussed goals of care and CODE STATUS.  In the setting of cardiopulmonary arrest, the patient is opted for a yes to CPR, resuscitative medications, defibrillation, and intubation versus mechanical  \"LIPASE\" in the last 72 hours.  Cardiac Enzymes: No results for input(s): \"CKTOTAL\", \"CKMB\", \"TROPONINI\" in the last 72 hours.  BNP: No results for input(s): \"BNP\" in the last 72 hours.  INR:   Recent Labs     01/07/24  1300   INR 10.03*     POC No results for input(s): \"POCGLU\" in the last 72 hours.  No results for input(s): \"LACTA\" in the last 72 hours.     sodium chloride      sodium chloride      sodium chloride      niCARdipine 5 mg/hr (01/07/24 1448)    sodium chloride      dextrose          prothrombin complex concentrate (human)  2,000 Units IntraVENous Once    sodium chloride flush  5-40 mL IntraVENous 2 times per day    polyethylene glycol  17 g Oral Daily    tranexamic acid  1,000 mg IntraVENous Once    Followed by    tranexamic acid  1,000 mg IntraVENous Once    phytonadione (ADULT) (VITAMIN K) 10 mg in sodium chloride 0.9 % 100 mL IVPB  10 mg IntraVENous Once    sodium chloride flush  5-40 mL IntraVENous 2 times per day    atorvastatin  10 mg Oral Daily    insulin lispro  0-8 Units SubCUTAneous TID WC    insulin lispro  0-4 Units SubCUTAneous Nightly    levETIRAcetam  500 mg Oral BID       24HR INTAKE/OUTPUT:    Intake/Output Summary (Last 24 hours) at 1/7/2024 1542  Last data filed at 1/7/2024 1341  Gross per 24 hour   Intake 120 ml   Output --   Net 120 ml     CT brain with out contrast:  Reading Physician Reading Date Result Priority   Jair Soto MD  [phone] 1/7/2024 STAT     Narrative & Impression  PROCEDURE: NONCONTRAST CT BRAIN     CLINICAL INFORMATION: trauma     COMPARISON: 1/19/2021    IMPRESSION:  1. Moderate size focal hematoma/soft tissue swelling left frontal/parietal region.  2. Small subdural hematoma left temporal/parietal region with a maximum thickness of 7 mm. There is of mixed density, suggesting a subacute process versus possibly acute on chronic.. There is no midline shift.      CT Cervical Spine with out contrast:  Reading Physician Reading Date Result Priority   Charles

## 2024-01-07 NOTE — CONSULTS
[place], [place]                                          NEUROSURGICAL CONSULTATION NOTE       Romario Marie   YOB: 1932  Account Number: [id number]   Date of Examination: 1/7/2024    ASSESSMENT:    -This is this is a 89-year-old male s/p ground-level fall who underwent a brain CT that showed acute subdural hemorrhage in the left side with a 7 mm in thickness  -GCS: 14/15  -Focal neurological deficit: Confused but there is no focal logical deficit at this time  -On Coumadin with INR about 10     PLAN:    -Medical management per ICU team and patient primary.  -Keep systolic blood pressure less than 160 and above 100.  -A dose of TXA to be given to the patient.  -Coagulation profile.  -Stop any anticoagulation medication at this time.  -Seizure precaution.  -Reverse any coagulopathy per protocol  -New brain CT after 6 hours and if it is stable new brain CT in the morning.  -Neurosurgery will follow.  -The case was discussed in detail with the ICU team, trauma team and with the patient and his family.    HISTORY OF PRESENT ILLNESS:  Romario Marie is a 89 y.o. male, admitted on :1/7/2024 12:50 PM  This is a 89-year-old male who presented to the ER for evaluation of injuries sustained after patient had a ground-level fall.  There is no previous history of loss of consciousness there is no history of new focal neurological deficit.  Patient is on Coumadin.  Patient underwent a brain CT that showed left sided subdural hemorrhage with 7 mm in thickness.  Patient is on Coumadin with INR about 10    ROS:    Review of Systems   Constitutional:  Negative for fever.   Eyes:  Negative for visual disturbance.   Respiratory:  Negative for apnea.    Cardiovascular:  Negative for chest pain.   Gastrointestinal:  Negative for abdominal distention.   Genitourinary:  Negative for difficulty urinating.   Musculoskeletal:  Negative for back pain.   Skin:  Positive for wound.   Neurological:

## 2024-01-07 NOTE — ED NOTES
This RN spoke with pharmacy about kcentra. Informed if kcentra is not ready within the next five minutes to send to ICU tube station due to ready bed

## 2024-01-07 NOTE — ED PROVIDER NOTES
[facility] 4A      CHIEF COMPLAINT       Chief Complaint   Patient presents with    Fall       Nurses Notes reviewed and I agree except as noted in the HPI.      HISTORY OF PRESENT ILLNESS    Romario Marie is a 89 y.o. male who presents with complaint of mechanical fall, patient tripped while going down his basement steps, stated that he fell forward striking his left forehead on concrete floor.  Patient denies loss of consciousness.  He has no neck pain.  Patient states that he is on Coumadin.  He has no headache, no change in sensorium per family.  Onset: Acute  Duration: Prior to arrival  Timing: Single event  Location of Pain: Left forehead hematoma  Intesity/severity:   Modifying Factors:   Relieved by;  Previous Episodes;  Tx Before arrival: None    PAST MEDICAL HISTORY    has a past medical history of Diabetes mellitus (HCC), Essential hypertension, H/O prostate cancer, Hyperlipidemia, and Paroxysmal atrial fibrillation (HCC).    SURGICAL HISTORY      has a past surgical history that includes Cholecystectomy; Hip fracture surgery (12/15/2018); and Femur fracture surgery (Left, 12/15/2018).    CURRENT MEDICATIONS       Current Discharge Medication List        CONTINUE these medications which have NOT CHANGED    Details   JANTOVEN 5 MG tablet Take 1 tablet by mouth daily 0.5 tablet MWF, 1 tablet T/Th/Sat/Sun      JANUVIA 100 MG tablet Take 1 tablet by mouth daily      metoprolol succinate (TOPROL XL) 25 MG extended release tablet Take 1 tablet by mouth daily      memantine ER (NAMENDA XR) 28 MG CP24 extended release capsule Take 1 capsule by mouth daily      alendronate (FOSAMAX) 70 MG tablet Take 1 tablet by mouth every 7 days      donepezil (ARICEPT) 10 MG tablet Take 1 tablet by mouth nightly at bedtime.      potassium chloride (KLOR-CON) 10 MEQ extended release tablet Take 1 tablet by mouth daily      traMADol (ULTRAM) 50 MG tablet Take 50 mg by mouth every 6 hours as needed for Pain..     Abnormal; Notable for the following components:    INR 10.03 (*)     All other components within normal limits   CALCIUM, IONIZED - Abnormal; Notable for the following components:    Calcium, Ionized 1.05 (*)     All other components within normal limits   BASIC METABOLIC PANEL W/ REFLEX TO MG FOR LOW K - Abnormal; Notable for the following components:    Glucose 143 (*)     All other components within normal limits   PROTIME-INR - Abnormal; Notable for the following components:    INR 2.09 (*)     All other components within normal limits   PROTIME-INR - Abnormal; Notable for the following components:    INR 1.58 (*)     All other components within normal limits   BASIC METABOLIC PANEL W/ REFLEX TO MG FOR LOW K - Abnormal; Notable for the following components:    Glucose 158 (*)     All other components within normal limits   TSH WITH REFLEX - Abnormal; Notable for the following components:    TSH 4.310 (*)     All other components within normal limits   POCT GLUCOSE - Abnormal; Notable for the following components:    POC Glucose 159 (*)     All other components within normal limits   POCT GLUCOSE - Abnormal; Notable for the following components:    POC Glucose 152 (*)     All other components within normal limits   POCT GLUCOSE - Abnormal; Notable for the following components:    POC Glucose 205 (*)     All other components within normal limits   POCT GLUCOSE - Abnormal; Notable for the following components:    POC Glucose 136 (*)     All other components within normal limits   POCT GLUCOSE - Abnormal; Notable for the following components:    POC Glucose 194 (*)     All other components within normal limits   MRSA BY PCR   CULTURE, AEROBIC    Narrative:     Source: other source       Site: groin + axilla          Current Antibiotics: not stated   ANION GAP   GLOMERULAR FILTRATION RATE, ESTIMATED   OSMOLALITY   CBC   APTT   ANION GAP   GLOMERULAR FILTRATION RATE, ESTIMATED   CBC   MAGNESIUM   T4, FREE   MAGNESIUM    ANION GAP   GLOMERULAR FILTRATION RATE, ESTIMATED   PREPARE PLASMA    Narrative:     [id number]     transfused   TYPE AND SCREEN       EMERGENCY DEPARTMENT COURSE:   Vitals:    Vitals:    01/08/24 1800 01/08/24 1936 01/08/24 2328 01/09/24 0351   BP: (!) 140/84 (!) 144/53 (!) 152/85 (!) 154/84   Pulse: (!) 44 63 65 86   Resp: 15 16 16 16   Temp:  98.1 °F (36.7 °C) 98 °F (36.7 °C) 98 °F (36.7 °C)   TempSrc:  Oral Oral Oral   SpO2:  96% 96% 93%   Weight:    75 kg (165 lb 5.5 oz)   Height:             CRITICAL CARE:       CONSULTS:  None    PROCEDURES:  none    FINAL IMPRESSION      1. Paroxysmal atrial fibrillation (HCC)    2. Bradycardia    3. Supratherapeutic INR    4. Subdural hemorrhage (HCC)          DISPOSITION/PLAN   Admitted    PATIENT REFERRED TO:  No follow-up provider specified.    DISCHARGE MEDICATIONS:  Current Discharge Medication List          (Please note that portions of this note were completed with a voice recognition program.  Efforts were made to edit the dictations but occasionally words are mis-transcribed.)    DO Sundar Melvin Seth, DO  01/09/24 0755

## 2024-01-07 NOTE — H&P
[facility]    1/7/2024   TRAUMA ATTENDING NOTE  ACTIVATION LEVEL: 2  ARRIVAL TIME:  1:20 pm    The patient was seen, interviewed and examined by me.  I have performed the physical exam as the substantive portion of the shared visit, interviewing and examining the patient, reviewing test results, medical decision making and discussing the case with staff, patient and family.      CC: fall    HPI: Patient presented after fall at home.  According to the wife he was helping with some laundry he was carrying a basket down the steps and when he got to the bottom he thought he was on the bottom step when there was 1 more and he missed stepped fell hitting his head.  There was no loss of consciousness.  He is on Coumadin for atrial fibrillation.  The wife says he has dementia but he is at his baseline.    Physical Exam:  ED Triage Vitals [01/07/24 1300]   Enc Vitals Group      BP (!) 150/74      Pulse 98      Respirations 18      Temp 98.7 °F (37.1 °C)      Temp Source Oral      SpO2 98 %      Weight - Scale 79.4 kg (175 lb)      Height 1.753 m (5' 9\")      Head Circumference       Peak Flow       Pain Score       Pain Loc       Pain Edu?       Excl. in GC?      Vitals:    01/07/24 1323 01/07/24 1330 01/07/24 1346 01/07/24 1400   BP: (!) 152/67 (!) 164/88 (!) 151/96 (!) 172/84   Pulse: 68 75 62 66   Resp: 14 13 12 17   Temp:       TempSrc:       SpO2: 99% 97% 95% 96%   Weight:       Height:             GENERAL: General: alert, cooperative, no distress  HEAD: Large left frontal forehead scalp hematoma measures approximately 10 cm bulges out approximately 3 cm  EYES: pupils equal, round, and reactive to light  EARS: normal TMs  NOSE: Nares normal. Septum midline. Mucosa normal. No drainage or sinus tenderness.  THROAT:normal  NECK:normal C-spine, no tenderness, FROM without pain, normal neurological exam of arms; normal DTRs, motor, sensory exam  BREATH SOUNDS: sounds: breath sounds clear and equal  Rate, Estimated     Standing Status:   Standing     Number of Occurrences:   1    Osmolality     Standing Status:   Standing     Number of Occurrences:   1    ADMIT TO INPATIENT     Standing Status:   Standing     Number of Occurrences:   1     Order Specific Question:   Discharge Plan:     Answer:   Home with Office Follow-up     Order Specific Question:   Telemetry/Cardiac Monitoring Required?     Answer:   Yes     Order Specific Question:   Bed request comments     Answer:   icu    FOLLOWED BY Linked Order Group     prothrombin complex concentrate (human) (KCENTRA) infusion 2,000 Units     0.9 % sodium chloride infusion    DISCONTD: phytonadione (ADULT) (VITAMIN K) 10 mg in sodium chloride 0.9 % 100 mL IVPB       Recent Labs     01/07/24  1300   WBC 8.7   HGB 15.8   HCT 48.4         K 3.9      CO2 27   BUN 10   CREATININE 0.9   CALCIUM 9.7   INR 10.03*     Recent Labs     01/07/24  1300   INR 10.03*     No results for input(s): \"TROPONINT\" in the last 72 hours.    CT CERVICAL SPINE WO CONTRAST    Result Date: 1/7/2024  Degenerative changes. Osteoporosis. No acute findings. **This report has been created using voice recognition software.  It may contain minor errors which are inherent in voice recognition technology.** Final report electronically signed by Dr. Jair Soto on 1/7/2024 1:42 PM    CT HEAD WO CONTRAST    Result Date: 1/7/2024  1. Moderate size focal hematoma/soft tissue swelling left frontal/parietal region. 2. Small subdural hematoma left temporal/parietal region with a maximum thickness of 7 mm. There is of mixed density, suggesting a subacute process versus possibly acute on chronic.. There is no midline shift. **This report has been created using voice recognition software.  It may contain minor errors which are inherent in voice recognition technology.** Final report electronically signed by Dr. Jair Soto on 1/7/2024 1:39 PM      Plan; DISPOSITION: Admit to

## 2024-01-07 NOTE — ED NOTES
Pt back to room from CT in stable condition. Dr. Schwab states pt has a subdural bleed. Neuro checks started at this time

## 2024-01-07 NOTE — ED NOTES
Pt arrives to the ED for a fall that occurred about 25 minutes prior to arrival. Pt states he was walking down the stair with a bag of laundry down the stairs. Pt states he thought he was at the bottom of the stairs and was not. Pt fell over the last 2 steps and landed face first on the floor. Pt states he was did not lose consciousness and is on coumadin. On arrival to the ED walked around the bed and was able to help remove his clothing. Pt denies any pain in his head, neck, back, chest or abdomen. Pt is oriented x4 however per wife has dementia at baseline and intermittently confused. Pt respirations are unlabored. VSS

## 2024-01-07 NOTE — H&P
WBC 8.7   HGB 15.8   HCT 48.4         K 3.9      CO2 27   BUN 10   CREATININE 0.9   CALCIUM 9.7   INR 10.03*     Recent Labs     01/07/24  1300   INR 10.03*     No results for input(s): \"TROPONINT\" in the last 72 hours.    CT CERVICAL SPINE WO CONTRAST    Result Date: 1/7/2024  Degenerative changes. Osteoporosis. No acute findings. **This report has been created using voice recognition software.  It may contain minor errors which are inherent in voice recognition technology.** Final report electronically signed by Dr. Jair Soto on 1/7/2024 1:42 PM    CT HEAD WO CONTRAST    Result Date: 1/7/2024  1. Moderate size focal hematoma/soft tissue swelling left frontal/parietal region. 2. Small subdural hematoma left temporal/parietal region with a maximum thickness of 7 mm. There is of mixed density, suggesting a subacute process versus possibly acute on chronic.. There is no midline shift. **This report has been created using voice recognition software.  It may contain minor errors which are inherent in voice recognition technology.** Final report electronically signed by Dr. Jair Soto on 1/7/2024 1:39 PM      Plan; DISPOSITION: Admit to CCU/ICU  Urgent reversal of Coumadin as his INR is over 10 with Kcentra, vitamin K, and TXA  ICU admission  Critical care consult  Neurosurgical consult  Repeat CT scan await opinion from neurosurgery whether 6 hours or in the morning  Bedrest  Further plans pending clinical course          Patient seen and examined independently by me 1/7/2024     I personally supervised the PA/NP in the evaluation, management and development of the treatment plan for Romario Marie  on the same date of service as above.      I personally interviewed Romario Marie   and  discussed his review of symptoms as able due to the patient's condition, as well as performed an individual physical exam on the same   date of service as above.  In addition I discussed the patient's  condition and treatment options with the patient, if able, and/or designated family if available.      I have also reviewed and agree with the past medical,  family and social history updates as well as care plans unless otherwise noted below.  All questions were answered.      I examined independently and reviewed relevant data myself and may have done so in the context of team rounds.  A full chart review was performed by me.       I attest that this medical record entry accurately reflects signatures and notations that I made in my capacity as an M.D. when I treated and diagnosed Romario Marie on the date of service above     I was responsible for all medical decision making involving this encounter.      I identified and/or confirmed all problems associated with this patient encounter by my own direct physical examination of this patient and review of all radiology studies and labwork  that were ordered and available.    Active Hospital Problems    Diagnosis     Subdural hemorrhage (HCC) [I62.00]     Accidental fall on or from stairs or steps [W10.8XXA]     Essential hypertension, benign [I10]     Paroxysmal atrial fibrillation (HCC) [I48.0]     Type 2 diabetes mellitus (HCC) [E11.9]         I  discussed the management of all of the identified problems with the APN or PA.      I formulated the treatment plan for all identified problems and discussed those with the APN or PA .      This management plan was then carried out and the patient's orders for care by the APN or PA.      Total time personally spent on this patient encounter was 78 minutes which includes :  Preparing to see the patient( reviewing tests and chart)  Obtaining and reviewing separately obtained history  Performing a medically appropriate examination and evaluation  Ordering medications, tests, or procedures  Counseling and educating the patient/family/caregiver  Care coordination  Referring and communicating with other healthcare

## 2024-01-08 ENCOUNTER — APPOINTMENT (OUTPATIENT)
Dept: CT IMAGING | Age: 89
DRG: 813 | End: 2024-01-08
Payer: MEDICARE

## 2024-01-08 PROBLEM — S06.5XAA SDH (SUBDURAL HEMATOMA) (HCC): Status: ACTIVE | Noted: 2024-01-08

## 2024-01-08 LAB
ANION GAP SERPL CALC-SCNC: 11 MEQ/L (ref 8–16)
BUN SERPL-MCNC: 7 MG/DL (ref 7–22)
CALCIUM SERPL-MCNC: 9.2 MG/DL (ref 8.5–10.5)
CHLORIDE SERPL-SCNC: 105 MEQ/L (ref 98–111)
CO2 SERPL-SCNC: 26 MEQ/L (ref 23–33)
CREAT SERPL-MCNC: 0.7 MG/DL (ref 0.4–1.2)
DEPRECATED RDW RBC AUTO: 44.1 FL (ref 35–45)
EKG ATRIAL RATE: 63 BPM
EKG Q-T INTERVAL: 440 MS
EKG QRS DURATION: 98 MS
EKG QTC CALCULATION (BAZETT): 450 MS
EKG R AXIS: -55 DEGREES
EKG T AXIS: -4 DEGREES
EKG VENTRICULAR RATE: 63 BPM
ERYTHROCYTE [DISTWIDTH] IN BLOOD BY AUTOMATED COUNT: 13.7 % (ref 11.5–14.5)
GFR SERPL CREATININE-BSD FRML MDRD: > 60 ML/MIN/1.73M2
GLUCOSE BLD STRIP.AUTO-MCNC: 136 MG/DL (ref 70–108)
GLUCOSE BLD STRIP.AUTO-MCNC: 194 MG/DL (ref 70–108)
GLUCOSE BLD STRIP.AUTO-MCNC: 205 MG/DL (ref 70–108)
GLUCOSE SERPL-MCNC: 143 MG/DL (ref 70–108)
HCT VFR BLD AUTO: 43.1 % (ref 42–52)
HGB BLD-MCNC: 14.6 GM/DL (ref 14–18)
INR PPP: 2.09 (ref 0.85–1.13)
MAGNESIUM SERPL-MCNC: 1.7 MG/DL (ref 1.6–2.4)
MCH RBC QN AUTO: 30 PG (ref 26–33)
MCHC RBC AUTO-ENTMCNC: 33.9 GM/DL (ref 32.2–35.5)
MCV RBC AUTO: 88.5 FL (ref 80–94)
PLATELET # BLD AUTO: 182 THOU/MM3 (ref 130–400)
PMV BLD AUTO: 10.4 FL (ref 9.4–12.4)
POTASSIUM SERPL-SCNC: 3.9 MEQ/L (ref 3.5–5.2)
RBC # BLD AUTO: 4.87 MILL/MM3 (ref 4.7–6.1)
SODIUM SERPL-SCNC: 142 MEQ/L (ref 135–145)
T4 FREE SERPL-MCNC: 1.23 NG/DL (ref 0.93–1.76)
TSH SERPL DL<=0.005 MIU/L-ACNC: 4.31 UIU/ML (ref 0.4–4.2)
WBC # BLD AUTO: 8.1 THOU/MM3 (ref 4.8–10.8)

## 2024-01-08 PROCEDURE — 36415 COLL VENOUS BLD VENIPUNCTURE: CPT

## 2024-01-08 PROCEDURE — 70450 CT HEAD/BRAIN W/O DYE: CPT

## 2024-01-08 PROCEDURE — 2500000003 HC RX 250 WO HCPCS

## 2024-01-08 PROCEDURE — 82948 REAGENT STRIP/BLOOD GLUCOSE: CPT

## 2024-01-08 PROCEDURE — 92523 SPEECH SOUND LANG COMPREHEN: CPT

## 2024-01-08 PROCEDURE — 80048 BASIC METABOLIC PNL TOTAL CA: CPT

## 2024-01-08 PROCEDURE — 84439 ASSAY OF FREE THYROXINE: CPT

## 2024-01-08 PROCEDURE — 97116 GAIT TRAINING THERAPY: CPT

## 2024-01-08 PROCEDURE — 97166 OT EVAL MOD COMPLEX 45 MIN: CPT

## 2024-01-08 PROCEDURE — 99232 SBSQ HOSP IP/OBS MODERATE 35: CPT | Performed by: SURGERY

## 2024-01-08 PROCEDURE — 85610 PROTHROMBIN TIME: CPT

## 2024-01-08 PROCEDURE — 97530 THERAPEUTIC ACTIVITIES: CPT

## 2024-01-08 PROCEDURE — 83735 ASSAY OF MAGNESIUM: CPT

## 2024-01-08 PROCEDURE — 6370000000 HC RX 637 (ALT 250 FOR IP)

## 2024-01-08 PROCEDURE — 6370000000 HC RX 637 (ALT 250 FOR IP): Performed by: PHYSICIAN ASSISTANT

## 2024-01-08 PROCEDURE — 85027 COMPLETE CBC AUTOMATED: CPT

## 2024-01-08 PROCEDURE — 97112 NEUROMUSCULAR REEDUCATION: CPT

## 2024-01-08 PROCEDURE — 92610 EVALUATE SWALLOWING FUNCTION: CPT

## 2024-01-08 PROCEDURE — 97162 PT EVAL MOD COMPLEX 30 MIN: CPT

## 2024-01-08 PROCEDURE — 2060000000 HC ICU INTERMEDIATE R&B

## 2024-01-08 PROCEDURE — 2580000003 HC RX 258: Performed by: PHYSICIAN ASSISTANT

## 2024-01-08 PROCEDURE — 6370000000 HC RX 637 (ALT 250 FOR IP): Performed by: STUDENT IN AN ORGANIZED HEALTH CARE EDUCATION/TRAINING PROGRAM

## 2024-01-08 PROCEDURE — 99232 SBSQ HOSP IP/OBS MODERATE 35: CPT | Performed by: NEUROLOGICAL SURGERY

## 2024-01-08 PROCEDURE — 99291 CRITICAL CARE FIRST HOUR: CPT | Performed by: INTERNAL MEDICINE

## 2024-01-08 PROCEDURE — 84443 ASSAY THYROID STIM HORMONE: CPT

## 2024-01-08 RX ORDER — INSULIN LISPRO 100 [IU]/ML
0-4 INJECTION, SOLUTION INTRAVENOUS; SUBCUTANEOUS NIGHTLY
Status: DISCONTINUED | OUTPATIENT
Start: 2024-01-08 | End: 2024-01-11 | Stop reason: HOSPADM

## 2024-01-08 RX ORDER — MEMANTINE HYDROCHLORIDE 28 MG/1
28 CAPSULE, EXTENDED RELEASE ORAL DAILY
Status: ON HOLD | COMMUNITY
Start: 2023-10-13 | End: 2024-01-23 | Stop reason: HOSPADM

## 2024-01-08 RX ORDER — POTASSIUM CHLORIDE 750 MG/1
10 TABLET, FILM COATED, EXTENDED RELEASE ORAL DAILY
Status: ON HOLD | COMMUNITY
Start: 2023-10-06 | End: 2024-01-23 | Stop reason: HOSPADM

## 2024-01-08 RX ORDER — WARFARIN SODIUM 5 MG/1
5 TABLET ORAL DAILY
Status: ON HOLD | COMMUNITY
Start: 2023-10-06 | End: 2024-01-23 | Stop reason: HOSPADM

## 2024-01-08 RX ORDER — SITAGLIPTIN 100 MG/1
100 TABLET, FILM COATED ORAL DAILY
Status: ON HOLD | COMMUNITY
Start: 2023-10-06 | End: 2024-01-23 | Stop reason: HOSPADM

## 2024-01-08 RX ORDER — MAGNESIUM SULFATE HEPTAHYDRATE 40 MG/ML
2000 INJECTION, SOLUTION INTRAVENOUS ONCE
Status: COMPLETED | OUTPATIENT
Start: 2024-01-08 | End: 2024-01-08

## 2024-01-08 RX ORDER — METOPROLOL SUCCINATE 25 MG/1
25 TABLET, EXTENDED RELEASE ORAL DAILY
COMMUNITY
Start: 2023-10-06 | End: 2024-01-25

## 2024-01-08 RX ORDER — DONEPEZIL HYDROCHLORIDE 10 MG/1
10 TABLET, FILM COATED ORAL NIGHTLY
Status: ON HOLD | COMMUNITY
Start: 2023-10-06 | End: 2024-01-23 | Stop reason: HOSPADM

## 2024-01-08 RX ORDER — ALENDRONATE SODIUM 70 MG/1
70 TABLET ORAL
COMMUNITY
Start: 2023-10-06

## 2024-01-08 RX ORDER — INSULIN LISPRO 100 [IU]/ML
0-4 INJECTION, SOLUTION INTRAVENOUS; SUBCUTANEOUS
Status: DISCONTINUED | OUTPATIENT
Start: 2024-01-08 | End: 2024-01-11 | Stop reason: HOSPADM

## 2024-01-08 RX ADMIN — LEVETIRACETAM 500 MG: 500 TABLET, FILM COATED ORAL at 10:01

## 2024-01-08 RX ADMIN — SODIUM CHLORIDE 25 ML: 9 INJECTION, SOLUTION INTRAVENOUS at 19:33

## 2024-01-08 RX ADMIN — INSULIN LISPRO 2 UNITS: 100 INJECTION, SOLUTION INTRAVENOUS; SUBCUTANEOUS at 12:42

## 2024-01-08 RX ADMIN — ATORVASTATIN CALCIUM 10 MG: 10 TABLET, FILM COATED ORAL at 10:01

## 2024-01-08 RX ADMIN — MAGNESIUM SULFATE HEPTAHYDRATE 2000 MG: 40 INJECTION, SOLUTION INTRAVENOUS at 19:40

## 2024-01-08 RX ADMIN — SODIUM CHLORIDE, PRESERVATIVE FREE 10 ML: 5 INJECTION INTRAVENOUS at 19:37

## 2024-01-08 RX ADMIN — POTASSIUM BICARBONATE 10 MEQ: 782 TABLET, EFFERVESCENT ORAL at 19:37

## 2024-01-08 RX ADMIN — SODIUM CHLORIDE, PRESERVATIVE FREE 10 ML: 5 INJECTION INTRAVENOUS at 12:44

## 2024-01-08 RX ADMIN — LEVETIRACETAM 500 MG: 500 TABLET, FILM COATED ORAL at 21:49

## 2024-01-08 ASSESSMENT — PAIN SCALES - GENERAL: PAINLEVEL_OUTOF10: 0

## 2024-01-08 NOTE — ACP (ADVANCE CARE PLANNING)
Advance Care Planning     Advance Care Planning Inpatient Note  Spiritual Care Department    Today's Date: 1/8/2024  Unit: [facility] ICU 4D    Received request from IDT Member.  Upon review of chart and communication with care team, Spiritual Care will defer advance care planning with patient at this time.. Patient and Spouse was/were present in the room during visit.    Goals of ACP Conversation:  Discuss advance care planning documents  Facilitate a discussion related to patient's goals of care as they align with the patient's values and beliefs.    Health Care Decision Makers:     No healthcare decision makers have been documented.  Click here to complete HealthCare Decision Makers including selection of the Healthcare Decision Maker Relationship (ie \"Primary\")  Summary:  No Decision Maker named by patient at this time    Advance Care Planning Documents (Patient Wishes):  Healthcare Power of /Advance Directive Appointment of Health Care Agent  Living Will/Advance Directive     Assessment:  Pt is a 89y.o. male, sitting in easychair visiting with his wife, in 4D-07. Documents were explained along with multiple questions and clarifications were asked for/needed. Following explanation and leaving documents for study by both present,  prayed for pt met with gratitude by both present.    Interventions:  Provided education on documents for clarity and greater understanding  Encouraged ongoing ACP conversation with future decision makers and loved ones    Care Preferences Communicated:   No    Outcomes/Plan:  Two full, blank copies of Advance Directive documents left for patient and spouse; pt will notify spiritual health if/when ready to complete.    Electronically signed by Chaplain Karen on 1/8/2024 at 3:14 PM

## 2024-01-08 NOTE — PROGRESS NOTES
[facility]  INPATIENT PHYSICAL THERAPY  EVALUATION  [facility] ICU 4D - 4D-07/007-A    Time In: 0850  Time Out: 926  Timed Code Treatment Minutes: 27 Minutes  Minutes: 36          Date: 2024  Patient Name: Romario Marie,  Gender:  male        MRN: [id number]  : 1932  (89 y.o.)      Referring Practitioner: India Arzate PA-C  Diagnosis: SDH  Additional Pertinent Hx: Per EMR \"Patient presented after fall at home.  According to the wife he was helping with some laundry he was carrying a basket down the steps and when he got to the bottom he thought he was on the bottom step when there was 1 more and he missed stepped fell hitting his head.  There was no loss of consciousness.  He is on Coumadin for atrial fibrillation.  The wife says he has dementia but he is at his baseline.\" brain CT that showed acute subdural hemorrhage in the left side with a 7 mm in thickness     Restrictions/Precautions:  Restrictions/Precautions: General Precautions, Fall Risk  Position Activity Restriction  Other position/activity restrictions: hx of dementia, Keep systolic blood pressure less than 160 and above 100    Subjective:  Chart Reviewed: Yes  Patient assessed for rehabilitation services?: Yes  Family / Caregiver Present: Yes (wife)  Subjective: OK to see pt per nursing. Pt in bed when PT arrived, wife present and supportive, agreeable to PT session. Pt pleasant and cooperative, pt confused, however wife reports this is his baseline.    General:  Overall Orientation Status: Impaired  Orientation Level: Disoriented X4  Vision: Within Functional Limits  Hearing: Exceptions to WFL  Hearing Exceptions: Hard of hearing/hearing concerns       Pain: head pain, not rated    Vitals: Blood Pressure: 138/60  Oxygen: 97% on room air  Heart Rate: 72    Social/Functional History:    Lives With: Spouse  Type of Home: House  Home Layout: Two level, Bed/Bath upstairs  Home Access: Stairs to enter without  feet  Surface: Level Tile  Device:Cane  Gait Deviations:  Forward Flexed Posture, Slow Nathaly, Decreased Step Length Bilaterally, Decreased Weight Shift Right, Decreased Gait Speed, Wide Base of Support, and Unsteady Gait  Occasional R knee buckling due to weakness reported. Pt reports \"I have always have trouble with my knees.\" Cues for safety with slight unsteadiness noted.   *Trial RW next session.     Functional Outcome Measures: Completed  Mayes Balance Score: 28  AM-PAC Inpatient Mobility Raw Score : 18  AM-PAC Inpatient T-Scale Score : 43.63  Mayes Balance Score: 28  MAYES BALANCE TEST SCORING   Score of < 45 indicates a greater risk of falling  41-56= low fall risk  21-40= medium fall risk (recommendation of walking with assist at all times)  0-20= high fall risk  *Pt is medium risk for falls  Modified Bonita Scale:  +4 - Moderately severe disability; unable to walk and attend to bodily needs without assistance    ASSESSMENT:  Activity Tolerance:  Patient tolerance of  treatment: good.      Treatment Initiated: Treatment and education initiated within context of evaluation.  Evaluation time included review of current medical information, gathering information related to past medical, social and functional history, completion of standardized testing, formal and informal observation of tasks, assessment of data and development of plan of care and goals.  Treatment time included skilled education and facilitation of tasks to increase safety and independence with functional mobility for improved independence and quality of life.    Assessment:  Body Structures, Functions, Activity Limitations Requiring Skilled Therapeutic Intervention: Decreased functional mobility , Decreased cognition, Decreased endurance, Increased pain, Decreased posture, Decreased balance, Decreased strength, Decreased safe awareness  Assessment: Romario Marie is a 89 y.o. male who presents with the deficits stated previously. Pt requires 1

## 2024-01-08 NOTE — PROGRESS NOTES
[facility]  Trauma Surgery - Dr. Noah Schwab  Daily Progress Note  Pt Name: Romario Marie  Medical Record Number: [id number]  Date of Birth 2/26/1932   Today's Date: 1/8/2024    HD: # 1    CC:     ASSESSMENT  1.  Active Hospital Problems    Diagnosis Date Noted    Subdural hemorrhage (HCC) [I62.00] 01/07/2024    Accidental fall on or from stairs or steps [W10.8XXA] 01/07/2024    Essential hypertension, benign [I10] 01/07/2024    Paroxysmal atrial fibrillation (HCC) [I48.0] 01/07/2024    Type 2 diabetes mellitus (HCC) [E11.9] 12/14/2018         PLAN  Admitted to the ICU under Trauma services    - 01/08: May transfer out of ICU to step-down     Trauma by fall   - Fall precautions   - PT, OT eval and treat     Subdural Hematoma              - Initial head CT notes small SDH of the left temporal/parietal region with maximum thickness of 7 mm.  There is mixed density, suggesting subacute process versus acute on chronic.  No midline shift.    - Consult neurosurgery               - Neuro checks per unit routine              - Maintain systolic blood pressure between 100-160              - Trauma dose TXA NOT given; K-Centra given and 1 unit of FFP transfused              - Elevate head of bed approximately 30 degrees              - Hold all anticoagulant and antiplatelet medications              - Repeat head CT in 6 hours noted an enlarging left convexity acute SDH, up to 1.1 cm, previously 0.7 cm.  New left to right midline shift up to 0.3 cm              - Seizure precautions, Keppra 500 mg BID for one week   - 01/08: Repeat head CT this AM notes acute left cerebral convexity SDH, up to 0.6 cm, decreased in thickness when compared to prior study.  Minimal residual left-to-right midline shift, improved since prior study.  Neurosurgery has cleared for transfer out of ICU to step-down.     Supratherapeutic INR   - Takes Coumadin  - INR on arrival 10.03   - Reversed with K-Centra and 1 unit of FFP   -  hours) at 1/8/2024 0746  Last data filed at 1/8/2024 0500  Gross per 24 hour   Intake 1182.46 ml   Output 1150 ml   Net 32.46 ml     Diet NPO Exceptions are: Sips of Water with Meds    OBJECTIVE  CURRENT VITALS /88   Pulse 64   Temp 98.5 °F (36.9 °C) (Oral)   Resp 14   Ht 1.753 m (5' 9\")   Wt 77.5 kg (170 lb 12.7 oz)   SpO2 98%   BMI 25.22 kg/m²   GENERAL: Awake, alert, no acute distress, pleasant and cooperative with exam  HEENT: Normocephalic, pupils equal and reactive to light, nares patent bilaterally.  Ecchymosis to left side of face.  Hematoma to left forehead, improved in size.    NEURO: Alert and orient x2, GCS 14, follows commands, PMS intact in all four extremities, no signs of focal neurological deficits  CSPINE/BACK: No midline cervical, thoracic, or lumbar tenderness to palpation  HEART: Regular rate and rhythm with no obvious murmurs, rubs, gallops.  Distal pulses intact.  LUNGS/CHEST WALL: Lungs are clear to auscultation bilaterally with no wheezes, rales, rhonchi.  No respiratory distress or increased work of breathing.  No chest wall tenderness to palpation.    ABDOMEN: Abdomen soft, nondistended, with no tenderness to palpation.  No guarding or peritoneal signs.  Bowel sounds normal active  EXTREMITIES: No cyanosis or edema.  PMS intact in all four extremities.  No extremity tenderness to palpation.  Range of motion intact.  Strength 5/5 bilaterally with  strength and plantar/dorsiflexion.  SKIN: Warm and dry  WOUNDS: Left forehead hematoma with ecchymosis        LABS  CBC :   Recent Labs     01/07/24  1300 01/08/24  0614   WBC 8.7 8.1   HGB 15.8 14.6   HCT 48.4 43.1   MCV 90.8 88.5    182     BMP:   Recent Labs     01/07/24  1300 01/08/24  0614    142   K 3.9 3.9    105   CO2 27 26   BUN 10 7   CREATININE 0.9 0.7     COAGS:   Recent Labs     01/07/24  1300 01/07/24 2207 01/08/24  0614   APTT  --  37.7  --    INR 10.03* 1.58* 2.09*     Pancreas/HFP:  No results

## 2024-01-08 NOTE — PLAN OF CARE
Problem: Discharge Planning  Goal: Discharge to home or other facility with appropriate resources  Outcome: Progressing  Flowsheets (Taken 1/8/2024 0459)  Discharge to home or other facility with appropriate resources:   Identify barriers to discharge with patient and caregiver   Arrange for needed discharge resources and transportation as appropriate   Identify discharge learning needs (meds, wound care, etc)   Arrange for interpreters to assist at discharge as needed   Refer to discharge planning if patient needs post-hospital services based on physician order or complex needs related to functional status, cognitive ability or social support system     Problem: Safety - Adult  Goal: Free from fall injury  Outcome: Progressing  Flowsheets (Taken 1/8/2024 0459)  Free From Fall Injury:   Instruct family/caregiver on patient safety   Based on caregiver fall risk screen, instruct family/caregiver to ask for assistance with transferring infant if caregiver noted to have fall risk factors     Problem: Neurosensory - Adult  Goal: Achieves stable or improved neurological status  Outcome: Progressing  Flowsheets (Taken 1/8/2024 0459)  Achieves stable or improved neurological status:   Assess for and report changes in neurological status   Initiate measures to prevent increased intracranial pressure   Maintain blood pressure and fluid volume within ordered parameters to optimize cerebral perfusion and minimize risk of hemorrhage   Monitor temperature, glucose, and sodium. Initiate appropriate interventions as ordered     Problem: Cardiovascular - Adult  Goal: Maintains optimal cardiac output and hemodynamic stability  Outcome: Progressing  Flowsheets (Taken 1/8/2024 0459)  Maintains optimal cardiac output and hemodynamic stability:   Monitor blood pressure and heart rate   Monitor urine output and notify Licensed Independent Practitioner for values outside of normal range   Assess for signs of decreased cardiac output

## 2024-01-08 NOTE — PROGRESS NOTES
Pt is a 89y.o. male, sitting in easychair visiting with his wife, in 4D-07. Please refer to ACP note for additional context, re: AD's.     01/08/24 1512   Encounter Summary   Encounter Overview/Reason  Advance Care Planning   Service Provided For: Patient and family together   Referral/Consult From: Multi-disciplinary team   Support System Spouse   Last Encounter  01/08/24   Complexity of Encounter Moderate   Begin Time 1340   End Time  1410   Total Time Calculated 30 min   Advance Care Planning   Type ACP conversation   Assessment/Intervention/Outcome   Assessment Compromised coping;Impaired resilience;Peaceful;Calm   Intervention Active listening;Prayer (assurance of)/Lamar;Nurtured Hope;Explored/Affirmed feelings, thoughts, concerns;Discussed illness injury and it’s impact   Outcome Engaged in conversation;Expressed feelings, needs, and concerns;Expressed Gratitude;Receptive;Coping

## 2024-01-08 NOTE — PROGRESS NOTES
Patient was seen and examined in the ICU.    This is this is a 89-year-old male s/p ground-level fall who underwent a brain CT that showed acute subdural hemorrhage in the left side with a 7 mm in thickness       In today visit:      There is no acute event over the night.  Patient seems to be more awake and alert and and interactive today.  Otherwise there is no significant change in patient's symptoms or neurological exam compared with yesterday.  Today labs have been reviewed.  Notes of other services have been reviewed (INR:2 now).  Today brain CT: Improvement of patient subdural hemorrhage:    Acute left cerebral convexity subdural hematoma, up to 0.6 cm, decreased   in thickness when compared to prior study. Minimal residual left-to-right   midline shift, improved since prior study.      Patient recommendation treatment plan from neurosurgical perspective:    Continue current medical management per the patient primary and ICU team.  PT and OT.  Can be transferred to the floor from neurosurgical perspective.  Can be discharged from neurosurgical perspective once he is cleared by other medical services.  Keep the patient off Coumadin at this time.  Keep patient on Keppra for 1 week.  Patient can be discharged after he is cleared by other medical services.  He needs a new brain CT after 3 days and it it is stable another brain CT after  one week(as long as there is no decline patient's clinical status and follow-up the result followed with neurosurgery outpatient clinic visit).  The case was discussed in detail with the ICU team and with patient and his family.  From this time on, neurosurgery team will see this patient only as needed as long as he is in the hospital. Please call if you have any further questions or concerns regarding this patient.        *I spend a total of 35 minutes with greater than 60% of time spent face to face, counseling, coordinating care, examining patient, reviewing images and labs  personally, and speaking with team. Time may be discontiguous. Time does not include procedures.

## 2024-01-08 NOTE — PROGRESS NOTES
[facility]  INPATIENT OCCUPATIONAL THERAPY  STRZ ICU 4D  EVALUATION    Time:   Time In: 1100  Time Out: 1120  Timed Code Treatment Minutes: 10 Minutes  Minutes: 20          Date: 2024  Patient Name: Romario Marie,   Gender: male      MRN: [id number]  : 1932  (89 y.o.)  Referring Practitioner: India Arzate PA-C  Diagnosis: SDH  Additional Pertinent Hx: Per EMR:  \"Patient presented after fall at home.  According to the wife he was helping with some laundry he was carrying a basket down the steps and when he got to the bottom he thought he was on the bottom step when there was 1 more and he missed stepped fell hitting his head.  There was no loss of consciousness.  He is on Coumadin for atrial fibrillation.  The wife says he has dementia but he is at his baseline.\" brain CT that showed acute subdural hemorrhage in the left side with a 7 mm in thickness    Restrictions/Precautions:  Restrictions/Precautions: General Precautions, Fall Risk  Position Activity Restriction  Other position/activity restrictions: hx of dementia, Keep systolic blood pressure less than 160 and above 100    Subjective  Chart Reviewed: Yes, Orders, Progress Notes, History and Physical, Imaging  Patient assessed for rehabilitation services?: Yes  Family / Caregiver Present: Yes (wife)    Subjective: Pt reclined in bedside chair upon arrival, agreeable to OT session and motivated to participate.    Pain: 0/10:     Vitals: Blood Pressure: 124/89 (99)  Oxygen: >90% on RA  Heart Rate: 71 bpm  All vitals remained stable throughout    Social/Functional History:  Lives With: Spouse  Type of Home: House  Home Layout: Two level, Bed/Bath upstairs  Home Access: Stairs to enter without rails  Entrance Stairs - Number of Steps: 2 WILLIAMS, flight of steps to 2nd floor with B rail  Home Equipment: Cane, Walker, rolling   Bathroom Shower/Tub: Tub/Shower unit, Walk-in shower  Bathroom Toilet: Handicap height  Bathroom  Equipment: Built-in shower seat  IADL Comments: currently uses tub shower, plans to use walk in shower, pt complete most of cooking tasks, wife completes most cleaning       ADL Assistance: Independent  Homemaking Assistance: Needs assistance  Ambulation Assistance: Independent  Transfer Assistance: Independent    Active : No  Patient's  Info: Wife     Additional Comments: IND with use of SC in and out of the home. information obtained from wife.    VISION:Corrected    HEARING:  WFL    COGNITION: Decreased Recall, Impaired Memory, and Decreased Problem Solving    RANGE OF MOTION:  Bilateral Upper Extremity:  WFL    STRENGTH:  Bilateral Upper Extremity:  Impaired - grossly deconditioned    ADL:   Grooming: Stand By Assistance.  Brushing teeth while standing at sink. Tolerates standing ~3 minutes to complete .    BALANCE:  Sitting Balance:  Supervision.    Standing Balance: Stand By Assistance, Contact Guard Assistance.      BED MOBILITY:  Not Tested    TRANSFERS:  Sit to Stand:  Contact Guard Assistance, with increased time for completion, cues for hand placement.    Stand to Sit: Stand By Assistance, with increased time for completion, cues for hand placement.    **Completed X2 trials    FUNCTIONAL MOBILITY:  Assistive Device: Rolling Walker  Assist Level:  Contact Guard Assistance.   Distance: To and from bathroom and short distance in hallway  Trialed with both cane and RW. Pt demoes increased stability with RW although requires minimal cues for safety to not advance too far forward.      AM-PAC Inpatient Daily Activity Raw Score: 19  AM-PAC Inpatient ADL T-Scale Score : 40.22  ADL Inpatient CMS 0-100% Score: 42.8    Activity Tolerance:  Patient tolerance of  treatment: Good treatment tolerance       Assessment:  Assessment: Pt presents requiring increased assistance for ADLs, transfers, and functional mobility compared to PLOF. Pt will continue to benefit from OT services to improve independence with  Term Goal 2: Pt will complete BADL tasks with supervision to increase independence with self care tasks.  Short Term Goal 3: Pt will tolerate dynamic standing >5 minutes with supervision in prep for sinkside grooming tasks.  Long Term Goals  Time Frame for Long Term Goals : not set due to ELOS    AM-[facility] Inpatient Daily Activity Raw Score: 19  AM-PAC Inpatient ADL T-Scale Score : 40.22    Following session, patient left in safe position with all fall risk precautions in place.

## 2024-01-08 NOTE — PROGRESS NOTES
[facility]  SPEECH THERAPY  STRZ ICU 4D  Speech - Language - Cognitive Evaluation + Clinical Swallow Evaluation    SLP Individual Minutes  Time In: 1021  Time Out: 1041  Minutes: 20  Timed Code Treatment Minutes: 0 Minutes     Speech, Language, Cognitive Evaluation: 12 minutes  Clinical Swallow Evaluation: 8 minutes    Date: 2024  Patient Name: Romario Marie      CSN: [id number]   : 1932  (89 y.o.)  Gender: male   Referring Physician: Indai Arzate PA-C   Diagnosis: SDH  Precautions: Fall risk, aspiration precautions, modified low stimulation guidelines/protocol  History of Present Illness/Injury: Patient admitted to [facility] with above med dx; please refer to physician H&P for full details. Per chart review, \"89-year-old male with a PMH of paroxysmal atrial fibrillation, HTN, HLD, DM2 and prostate cancer presented [facility] ED 2024 after experiencing a fall.  Per report, the patient was helping with laundry, and while carrying the basket down the steps he assumed there was 1 more step but instead fell and hit his head.  He denies any loss of consciousness.  At the time he was on Coumadin.  Per wife, the patient has baseline mentation is limited due to history of dementia.\"    CTH 2024  Impression:  Enlarging left convexity acute subdural hematoma, up to 1.1 cm, previously   0.7 cm. New left-to-right midline shift up to 0.3 cm.    ST consulted to complete clinical swallow evaluation and assessment of cognitive linguistic domains s/t admitting dx.     Past Medical History:   Diagnosis Date    Diabetes mellitus (HCC)     Essential hypertension     H/O prostate cancer     Hyperlipidemia     Patient denies any diagnoses but is on medication    Paroxysmal atrial fibrillation (HCC)     Prior to surgery       Pain: No pain reported.    Subjective:  MATTEO Stephenson with approval to proceed with evaluations. Upon arrival, patient resting in recliner chair with spontaneous  Pharyngeal) Raises Back of Tongue WFL      CN XI (Accessory) Lifts Soft Palate WFL      CN XII (Hypoglossal) Elevates Tongue Up and Back WFL    Protrusion   WFL    Lateralizes Tongue WFL    Sensation WFL      Other Observations Dentition WFL    Vocal Quality WFL    Cough WFL     PATIENT WAS EVALUATED USING:  Thin Liquids and Coarse Solids    ORAL PHASE:  WFL    PHARYNGEAL PHASE:  WFL:  Pharyngeal phase appears WFL but cannot rule out pharyngeal phase deficits from a bedside swallowing evaluation alone.    SIGNS AND SYMPTOMS OF LARYNGEAL PENETRATION / ASPIRATION:  No signs/symptoms of aspiration evident in this evaluation, but cannot rule out silent aspiration.    INSTRUMENTAL EVALUATION: Instrumental evaluation not indicated at this time.    DIET RECOMMENDATIONS:  Regular, thin liquids    STRATEGIES: Full Upright Position, Small Bite/Sip, Pulmonary Monitoring, Oral Care after all Meals, Intermittent Supervision, Medications Whole with Puree, and Alternate Solids and Liquids     ASPIRATION PNEUMONIA PREDICTORS:  Decreased Cognition and Chronic Medical Issues/Pertinent Co-Morbidities    FUNCTIONAL ORAL INTAKE SCALE: Total Oral Intake: 7.  Total oral intake with no restrictions         RECOMMENDATIONS/ASSESSMENT:  DIAGNOSTIC IMPRESSIONS:  Clinical Swallow Evaluation: Patient presents with oral phase of swallow function that is essentially WFL with low suspicion for presence of a pharyngeal dysphagia; however, highly anticipate some degree of presbyphagia. No overt s/s aspiration exhibited across all consistencies/trials consumed, certainly not able to exclude pharyngeal phase dysfunction and/or airway invasion events without supportive imaging. Patient's swallow physiology does appear appropriate to support PO intake without distress with instrumental evaluation not warranted.     Recommend continuation of regular diet with thin liquids, limited subsequent dysphagia services to be rendered to assist with potential

## 2024-01-08 NOTE — CARE COORDINATION
Case Management Assessment  Initial Evaluation    Date/Time of Evaluation: 1/8/2024 12:04 PM  Assessment Completed by: Keke Saravia RN    If patient is discharged prior to next notation, then this note serves as note for discharge by case management.    Patient Name: Romario Marie                   YOB: 1932  Diagnosis: SDH (subdural hematoma) (HCC) [S06.5XAA]                   Date / Time: 1/7/2024 12:50 PM  Location: [address] [place] [address]     Patient Admission Status: Inpatient   Readmission Risk Low 0-14, Mod 15-19), High > 20: Readmission Risk Score: 8.3    Current PCP: Steve Santoro MD  PCP verified by CM? Yes    Chart Reviewed: Yes      History Provided by: Patient, Spouse (and Wife Tasneem)  Patient Orientation: Alert and Oriented, Self    Patient Cognition: Alert    Hospitalization in the last 30 days (Readmission):  No    If yes, Readmission Assessment in CM Navigator will be completed.    Advance Directives:      Code Status: Full Code   Patient's Primary Decision Maker is: Legal Next of Kin (Spiritual Care consulted for assistance with HCPOA paperwork)      Discharge Planning:    Patient lives with: Spouse/Significant Other Type of Home: House  Primary Care Giver: Self  Patient Support Systems include: Spouse/Significant Other, Family Members   Current Financial resources: Medicare  Current community resources: None  Current services prior to admission: Durable Medical Equipment            Current DME: Cane, Walker (Uses the cane when going out of the home; has walker if needed.)            Type of Home Care services:  None    ADLS  Prior functional level: Assistance with the following:, Housework, Shopping  Current functional level: Assistance with the following:, Housework, Shopping    Family can provide assistance at DC: Yes  Would you like Case Management to discuss the discharge plan with any other family members/significant others, and if so, who? No  Plans to Return to Present Housing:  Yes  Other Identified Issues/Barriers to RETURNING to current housing: none  Potential Assistance needed at discharge: Home Care            Potential DME:    Patient expects to discharge to: House  Plan for transportation at discharge: Family    Financial    Payor: [employer] MEDICARE / Plan: [employer] CHOICE-PPO MEDICARE / Product Type: *No Product type* /     Does insurance require precert for SNF: Yes    Potential assistance Purchasing Medications: No  Meds-to-Beds request: Yes    No Pharmacies Listed    Notes:    Factors facilitating achievement of predicted outcomes: Family support, Cooperative, and Pleasant    Barriers to discharge: Medical complications and Medication managment    Additional Case Management Notes: Presented to ED after a fall at home. Pt was helping with laundry and carrying a basket down the steps, though he was on bottom step when there was really one more, missed stepped and fell, hitting his head. Was on Coumadin for afib. Hx of dementia. INR was 10.03. KCentra and FFP given. Neurosurgery consulted for left frontal/parietal SDH. Started on Keppra. Admitted to ICU. Intensivist consulted. Cardene drip started; weaned off very early this morning. Passed swallow eval. Order to transfer to  stepdown; awaiting bed assignment.     NIH 2: LOC and extinction/inattention. Oriented to Self. Follows commands x4. On room air. Afebrile. Afib 60's. SLP/PT/OT. Intensivist and Neurosurgery following. Telemetry, NIHSS/neuro checks, SCDs. Lipitor, SSI, po keppra, glycolax, Electrolyte replacement protocols. INR 10.03 - down to 1.58 - now 2.09.     Procedure:   1/7 CT Head: Moderate size focal hematoma/soft tissue swelling left frontal/parietal region; Small subdural hematoma left temporal/parietal region with a maximum thickness of 7 mm. There is of mixed density, suggesting a subacute process versus possibly acute on chronic.. There is no midline shift.  1/7 CT Cervical Spine: No acute findings  1/7 Repeat CT  Head: Enlarging left convexity acute subdural hematoma, up to 1.1 cm, previously 0.7 cm. New left-to-right midline shift up to 0.3 cm.  1/8 CT Head: Acute left cerebral convexity subdural hematoma, up to 0.6 cm, decreased   in thickness when compared to prior study. Minimal residual left-to-right midline shift, improved since prior study.      The Plan for Transition of Care is related to the following treatment goals of SDH (subdural hematoma) (HCC) [S06.5XAA]    Patient Goals/Plan/Treatment Preferences: Home with wife. PCP doses coumadin. Denies needs, declines HH. Has cane and walker. Monitor therapy evals for possible needs.     Transportation/Food Security/Housekeeping Addressed: No issues identified.     Keke Saravia RN  Case Management Department

## 2024-01-08 NOTE — PROGRESS NOTES
Patient arrived to unit from ER via cart. Patient transferred to ICU bed and placed on continuous ICU bedside monitor. Patient admitted for SDH (subdural hematoma) (HCC) [S06.5XAA]. Vitals obtained. See flowsheets. Patient's IV access includes #20 rt hand. Current infusions and rates of infusion include INT. Assessment completed by Cheryl FELIPE. Two nurse skin assessment completed by Pattie FELIPE  and Cheryl RN. See flowsheets for assessment details. Policies and procedures of ICU unable to be explained to patient at this time. Family member(s)/representative(s) present at time of admission include wife, Tasneem.. Patient rights explained to family member(s)/representatives and patient, as able. Patient/patient's family member(s)/representative(s) Declined to have physician notified of their admission. All questions posed by patient's family member(s)/representative(s) and patient answered at this time.   Pt buttocks appears reddened with  few blisters to lt side- pt does wear adult diapers at home.  1530- Wife at bedside-updated on pt condition and POC.  Wife states pt does have some dementia and tends to forget things he should remember.  He is able to get up and around by himself at home and take care of all personal needs and  do household chores.

## 2024-01-08 NOTE — PLAN OF CARE
Patient is a 89-year-old male, past medical history of PAF, HTN, HLD, and NIDDM2, admitted to [facility] ICU for subdural hematoma 2/2 mechanical fall at home iso supratherapeutic INR. At time of initial presentation patient noted to have INR of greater than 10. On discussion with patient's wife at bedside, she notes that while packing the patient's pillbox she accidentally dosed him one one full 5mg tablet daily for the past week instead of the half tablet he was supposed to take on MWF. Patient's wife notes that they have been following with his PCP for monthly INR checks and he was previously at INR 3 one month ago. Patient most recent INR is 2.09.     Per neurosurgery will continue to hold warfarin at this time. Will transfer patient from ICU [facility] to [facility]. Will continue neuro checks every four hours at this time as well as continue Keppra for one week total duration, to end on 1/14. Will also repeat CT head w/o contrast on 1/11 per neurosurgery recommendation and then repeat on 1/18, which may be done as outpatient. Recommendations to maintain SBP < 160. Patient blood pressure currently in range without home antihypertensive medications and has been discontinued from nicardipine gtt as of 0031 1/8/2024. Holding home antihypertensives at this time given patient normotensive at this time. Fall precautions in place.    Patient potassium 3.9, magnesium 1.7. Will replete with 10 mEq potassium and 2g mag at this time. Recommend keeping K > 4 and Mg > 2. Patient noted to have HR of 44 at 1800, orders placed for repeat EKG and TSH. Will reassess vitals when patient transferred to . Recommend pacer pads be placed given patient has history of bradycardia in 2018.     If patient noted to have change during neuro checks, will require stat CT head to assess for possible changes, continue blood pressure parameters as noted above.    Physical exam  General appearance: No apparent distress, well developed, appears stated age.  Bruising noted to left face surrounding superior orbit.  Eyes:  Pupils equal, round, and reactive to light. Conjunctivae/corneas clear.  HENT: Head normal in appearance. External nares normal.  Oral mucosa moist without lesions.  Moderately hard of hearing.  Neck: Supple, with full range of motion. Trachea midline.  No gross JVD appreciated.  Respiratory:  Normal respiratory effort. Clear to auscultation, bilaterally without rales or wheezes or rhonchi.  Cardiovascular: Irregularly irregular rhythm with normal S1/S2 without murmurs.  No lower extremity edema.   Abdomen: Soft, non-tender, non-distended with normal bowel sounds.  Musculoskeletal: There is no joint swelling or tenderness. Normal tone. No abnormal movements.   Skin: Warm and dry. No rashes or lesions.  Neurologic:  No focal sensory/motor deficits in the upper and lower extremities. Cranial nerves:  grossly non-focal 2-12.     Psychiatric: Alert and oriented to person/time but not place, normal insight and thought content.   Capillary Refill: Brisk,< 3 seconds.  Peripheral Pulses: +2 palpable, equal bilaterally.

## 2024-01-08 NOTE — PROGRESS NOTES
CRITICAL CARE PROGRESS NOTE      Patient:  Romario Marie                       Unit/Bed:4D-07/007-A  YOB: 1932  MRN: [id number]            PCP: Steve Santoro MD  Date of Admission: 1/7/2024  Chief Complaint:-Fall     Assessment and Plan:    Subdural hematoma: Secondary to mechanical fall.  In the setting of supratherapeutic INR.  Presented trauma alert.  CT head with moderate focal hematoma left frontal/parietal region and small subdural hematoma left temporal/parietal region with thickness 7 mm, mixed density and no midline shift.  Initial INR 10.03, s/p Kcentra x 1 and FFP.  Neurosurgery and trauma following.  Neurochecks every 4 hours, NIHSS every shift.  Started on Keppra 500 mg p.o. twice daily.  Fall and seizure precautions in place.  Started on Cardene gtt. with SBP goal 100-140.  Repeat CT head demonstrated acute left cerebral convexity subdural hematoma, up to 0.6 cm, decrease in thickness when compared to prior study.  Minimal residual left-to-right midline shift, improved since prior study.  GCS 15  Paroxysmal atrial fibrillation: [id number] 4.  On Coumadin for anticoagulation, no rate or rhythm control agents.  Initial INR supratherapeutic in setting of bleed, see above.  Hold anticoagulation.  SCD placed.  On Cardene gtt. for BP control.  Placed on continuous telemetry.  Initial INR 10.03.  Repeat INR 2.09.  HTN: Home antihypertensive regimen includes lisinopril-hydrochlorothiazide.  Started on Cardene gtt. for optimal BP control.  Monitor BP.  HLD: Started Lipitor 10 mg p.o. daily  NIDDMII: Last HgbA1c unknown, will order..  Home antidiabetic regimen includes saxagliptin and metformin.  Started on medium dose SSI scale with Accu-Cheks.  Monitor blood glucose with daily BMP.  Adjust insulin accordingly.  Hypoglycemia protocols initiated.  History of prostate cancer: Per chart review  History of dementia: Baseline mentation per wife.  AOx3.  CODE STATUS: Per record, goals of care

## 2024-01-09 ENCOUNTER — APPOINTMENT (OUTPATIENT)
Age: 89
DRG: 813 | End: 2024-01-09
Payer: MEDICARE

## 2024-01-09 PROBLEM — R79.1 SUPRATHERAPEUTIC INR: Status: ACTIVE | Noted: 2024-01-09

## 2024-01-09 LAB
ANION GAP SERPL CALC-SCNC: 14 MEQ/L (ref 8–16)
BACTERIA SPEC AEROBE CULT: NORMAL
BUN SERPL-MCNC: 8 MG/DL (ref 7–22)
CALCIUM SERPL-MCNC: 9.1 MG/DL (ref 8.5–10.5)
CHLORIDE SERPL-SCNC: 103 MEQ/L (ref 98–111)
CO2 SERPL-SCNC: 23 MEQ/L (ref 23–33)
CREAT SERPL-MCNC: 0.7 MG/DL (ref 0.4–1.2)
DEPRECATED MEAN GLUCOSE BLD GHB EST-ACNC: 132 MG/DL (ref 70–126)
DEPRECATED RDW RBC AUTO: 43.6 FL (ref 35–45)
ECHO AV CUSP MM: 1.9 CM
ECHO AV PEAK GRADIENT: 8 MMHG
ECHO AV PEAK VELOCITY: 1.4 M/S
ECHO AV VELOCITY RATIO: 0.64
ECHO BSA: 1.97 M2
ECHO LA AREA 4C: 16.9 CM2
ECHO LA DIAMETER INDEX: 2.3 CM/M2
ECHO LA DIAMETER: 4.4 CM
ECHO LA MAJOR AXIS: 5.1 CM
ECHO LA VOL MOD A4C: 46 ML (ref 18–58)
ECHO LA VOLUME INDEX MOD A4C: 24 ML/M2 (ref 16–34)
ECHO LV FRACTIONAL SHORTENING: 30 % (ref 28–44)
ECHO LV INTERNAL DIMENSION DIASTOLE INDEX: 2.3 CM/M2
ECHO LV INTERNAL DIMENSION DIASTOLIC: 4.4 CM (ref 4.2–5.9)
ECHO LV INTERNAL DIMENSION SYSTOLIC INDEX: 1.62 CM/M2
ECHO LV INTERNAL DIMENSION SYSTOLIC: 3.1 CM
ECHO LV IVSD: 1.1 CM (ref 0.6–1)
ECHO LV MASS 2D: 137.8 G (ref 88–224)
ECHO LV MASS INDEX 2D: 72.1 G/M2 (ref 49–115)
ECHO LV POSTERIOR WALL DIASTOLIC: 0.8 CM (ref 0.6–1)
ECHO LV RELATIVE WALL THICKNESS RATIO: 0.36
ECHO LVOT PEAK GRADIENT: 3 MMHG
ECHO LVOT PEAK VELOCITY: 0.9 M/S
ECHO MV A VELOCITY: 0.85 M/S
ECHO MV E DECELERATION TIME (DT): 377 MS
ECHO MV E VELOCITY: 0.7 M/S
ECHO MV E/A RATIO: 0.82
ECHO PV MAX VELOCITY: 1 M/S
ECHO PV PEAK GRADIENT: 4 MMHG
ECHO RV INTERNAL DIMENSION: 3 CM
ECHO RV TAPSE: 1.6 CM (ref 1.7–?)
ECHO TV E WAVE: 0.6 M/S
ERYTHROCYTE [DISTWIDTH] IN BLOOD BY AUTOMATED COUNT: 13.8 % (ref 11.5–14.5)
GFR SERPL CREATININE-BSD FRML MDRD: > 60 ML/MIN/1.73M2
GLUCOSE BLD STRIP.AUTO-MCNC: 143 MG/DL (ref 70–108)
GLUCOSE BLD STRIP.AUTO-MCNC: 167 MG/DL (ref 70–108)
GLUCOSE BLD STRIP.AUTO-MCNC: 173 MG/DL (ref 70–108)
GLUCOSE BLD STRIP.AUTO-MCNC: 216 MG/DL (ref 70–108)
GLUCOSE SERPL-MCNC: 158 MG/DL (ref 70–108)
HBA1C MFR BLD HPLC: 6.4 % (ref 4.4–6.4)
HCT VFR BLD AUTO: 43.7 % (ref 42–52)
HGB BLD-MCNC: 14.6 GM/DL (ref 14–18)
INR PPP: 2.34 (ref 0.85–1.13)
MAGNESIUM SERPL-MCNC: 1.9 MG/DL (ref 1.6–2.4)
MCH RBC QN AUTO: 29.4 PG (ref 26–33)
MCHC RBC AUTO-ENTMCNC: 33.4 GM/DL (ref 32.2–35.5)
MCV RBC AUTO: 87.9 FL (ref 80–94)
PLATELET # BLD AUTO: 173 THOU/MM3 (ref 130–400)
PMV BLD AUTO: 10.3 FL (ref 9.4–12.4)
POTASSIUM SERPL-SCNC: 3.8 MEQ/L (ref 3.5–5.2)
RBC # BLD AUTO: 4.97 MILL/MM3 (ref 4.7–6.1)
SODIUM SERPL-SCNC: 140 MEQ/L (ref 135–145)
WBC # BLD AUTO: 8 THOU/MM3 (ref 4.8–10.8)

## 2024-01-09 PROCEDURE — 99232 SBSQ HOSP IP/OBS MODERATE 35: CPT | Performed by: SURGERY

## 2024-01-09 PROCEDURE — 93306 TTE W/DOPPLER COMPLETE: CPT | Performed by: NUCLEAR MEDICINE

## 2024-01-09 PROCEDURE — 99232 SBSQ HOSP IP/OBS MODERATE 35: CPT | Performed by: INTERNAL MEDICINE

## 2024-01-09 PROCEDURE — 6370000000 HC RX 637 (ALT 250 FOR IP)

## 2024-01-09 PROCEDURE — 97535 SELF CARE MNGMENT TRAINING: CPT

## 2024-01-09 PROCEDURE — 93306 TTE W/DOPPLER COMPLETE: CPT

## 2024-01-09 PROCEDURE — 6370000000 HC RX 637 (ALT 250 FOR IP): Performed by: PHYSICIAN ASSISTANT

## 2024-01-09 PROCEDURE — 97116 GAIT TRAINING THERAPY: CPT

## 2024-01-09 PROCEDURE — 83036 HEMOGLOBIN GLYCOSYLATED A1C: CPT

## 2024-01-09 PROCEDURE — 97530 THERAPEUTIC ACTIVITIES: CPT

## 2024-01-09 PROCEDURE — 80048 BASIC METABOLIC PNL TOTAL CA: CPT

## 2024-01-09 PROCEDURE — 2580000003 HC RX 258: Performed by: PHYSICIAN ASSISTANT

## 2024-01-09 PROCEDURE — 36415 COLL VENOUS BLD VENIPUNCTURE: CPT

## 2024-01-09 PROCEDURE — 2060000000 HC ICU INTERMEDIATE R&B

## 2024-01-09 PROCEDURE — 99222 1ST HOSP IP/OBS MODERATE 55: CPT | Performed by: NURSE PRACTITIONER

## 2024-01-09 PROCEDURE — 6370000000 HC RX 637 (ALT 250 FOR IP): Performed by: STUDENT IN AN ORGANIZED HEALTH CARE EDUCATION/TRAINING PROGRAM

## 2024-01-09 PROCEDURE — 83735 ASSAY OF MAGNESIUM: CPT

## 2024-01-09 PROCEDURE — 85610 PROTHROMBIN TIME: CPT

## 2024-01-09 PROCEDURE — 82948 REAGENT STRIP/BLOOD GLUCOSE: CPT

## 2024-01-09 PROCEDURE — 6360000002 HC RX W HCPCS

## 2024-01-09 PROCEDURE — 85027 COMPLETE CBC AUTOMATED: CPT

## 2024-01-09 RX ORDER — HYDROCHLOROTHIAZIDE 25 MG/1
12.5 TABLET ORAL DAILY
Status: DISCONTINUED | OUTPATIENT
Start: 2024-01-09 | End: 2024-01-11 | Stop reason: HOSPADM

## 2024-01-09 RX ORDER — LISINOPRIL AND HYDROCHLOROTHIAZIDE 12.5; 1 MG/1; MG/1
1 TABLET ORAL DAILY
Status: DISCONTINUED | OUTPATIENT
Start: 2024-01-09 | End: 2024-01-09 | Stop reason: CLARIF

## 2024-01-09 RX ORDER — LISINOPRIL 10 MG/1
10 TABLET ORAL DAILY
Status: DISCONTINUED | OUTPATIENT
Start: 2024-01-09 | End: 2024-01-11 | Stop reason: HOSPADM

## 2024-01-09 RX ORDER — MAGNESIUM SULFATE 1 G/100ML
1000 INJECTION INTRAVENOUS ONCE
Status: COMPLETED | OUTPATIENT
Start: 2024-01-09 | End: 2024-01-09

## 2024-01-09 RX ADMIN — LEVETIRACETAM 500 MG: 500 TABLET, FILM COATED ORAL at 19:49

## 2024-01-09 RX ADMIN — LISINOPRIL 10 MG: 10 TABLET ORAL at 17:00

## 2024-01-09 RX ADMIN — INSULIN LISPRO 1 UNITS: 100 INJECTION, SOLUTION INTRAVENOUS; SUBCUTANEOUS at 11:42

## 2024-01-09 RX ADMIN — MAGNESIUM SULFATE HEPTAHYDRATE 1000 MG: 1 INJECTION, SOLUTION INTRAVENOUS at 10:04

## 2024-01-09 RX ADMIN — HYDROCHLOROTHIAZIDE 12.5 MG: 25 TABLET ORAL at 17:00

## 2024-01-09 RX ADMIN — SODIUM CHLORIDE, PRESERVATIVE FREE 10 ML: 5 INJECTION INTRAVENOUS at 10:07

## 2024-01-09 RX ADMIN — LEVETIRACETAM 500 MG: 500 TABLET, FILM COATED ORAL at 09:49

## 2024-01-09 RX ADMIN — POTASSIUM BICARBONATE 20 MEQ: 782 TABLET, EFFERVESCENT ORAL at 09:49

## 2024-01-09 RX ADMIN — ATORVASTATIN CALCIUM 10 MG: 10 TABLET, FILM COATED ORAL at 09:49

## 2024-01-09 RX ADMIN — SODIUM CHLORIDE, PRESERVATIVE FREE 10 ML: 5 INJECTION INTRAVENOUS at 19:49

## 2024-01-09 ASSESSMENT — PAIN SCALES - GENERAL
PAINLEVEL_OUTOF10: 0

## 2024-01-09 NOTE — CONSULTS
Physical Medicine & Rehabilitation   Consult Note      Admitting Physician: Noah Schwab MD    Primary Care Provider: Steve Santoro MD     Reason for Consult:  SDH. Rehab needs     History of Present Illness:  Romario Marie is a 89 y.o. male admitted to [facility] on 1/7/2024. Patient  has a past medical history of Diabetes mellitus (HCC), Essential hypertension, H/O prostate cancer, Hyperlipidemia, and Paroxysmal atrial fibrillation (HCC). Patient presented to [facility] ER after suffering a fall at home. Patient was carrying a basket down stairs and missed the bottom steps, causing a fall. Patient with atrial fibrillation and on coumadin.  CT head with moderate focal hematoma left frontal/parietal region and small subdural hematoma left temporal/parietal region with thickness 7 mm, mixed density and no midline shift.  Initial INR 10.03, s/p Kcentra x 1 and FFP. Patient was admitted to ICU. Repeat INR was 2. F/u CTH demonstrated acute left cerebral convexity subdural hematoma, up to 0.6 cm, decrease in thickness when compared to prior study, Minimal residual left-to-right midline shift, improved since prior study. Patient was evaluated by neurosurgery. No surgery planned. Keppra BID for one week, hold coumadin, plan f/u CTH in 3 days and 1 week. Patient with need for Cardene gtt for BP, this was stopped 1/8. Patient as transferred out of ICU to neuro stepdown on 1/8/24. Patient with SLP evaluation, wife noted to be at baseline due to dementia, but new findings of issues with sequencing with mobility noted with PT and OT. PM&R was consulted to evaluate further for IPR consideration.     1/9/24: Patient seen today in consult. Patient sitting up in chair. Wife present. Patient with noted hematoma over left scalp and ecchymosis around eye and down left face. Patient denies any headache. Patient is very pleasant. Discussed concerns with therapy and needing further therapy prior to discharge.     RDW-CV 13.8 11.5 - 14.5 %    RDW-SD 43.6 35.0 - 45.0 fL    Platelets 173 130 - 400 thou/mm3    MPV 10.3 9.4 - 12.4 fL   Magnesium    Collection Time: 01/09/24  5:40 AM   Result Value Ref Range    Magnesium 1.9 1.6 - 2.4 mg/dL   Anion Gap    Collection Time: 01/09/24  5:40 AM   Result Value Ref Range    Anion Gap 14.0 8.0 - 16.0 meq/L   Glomerular Filtration Rate, Estimated    Collection Time: 01/09/24  5:40 AM   Result Value Ref Range    Est, Glom Filt Rate >60 >60 ml/min/1.73m2   POCT Glucose    Collection Time: 01/09/24  7:18 AM   Result Value Ref Range    POC Glucose 143 (H) 70 - 108 mg/dl   Echo (TTE) complete (PRN contrast/bubble/strain/3D)    Collection Time: 01/09/24  8:15 AM   Result Value Ref Range    LA Major [place] 5.1 cm    LA Area 4C 16.9 cm2    LA Volume MOD A4C 46 18 - 58 mL    LA Diameter 4.4 cm    AV Cusp Mmode 1.9 cm    AV Peak Velocity 1.4 m/s    AV Peak Gradient 8 mmHg    IVSd 1.1 (A) 0.6 - 1.0 cm    LVIDd 4.4 4.2 - 5.9 cm    LVIDs 3.1 cm    LVOT Peak Velocity 0.9 m/s    LVOT Peak Gradient 3 mmHg    LVPWd 0.8 0.6 - 1.0 cm    MV E Wave Deceleration Time 377.0 ms    MV A Velocity 0.85 m/s    MV E Velocity 0.70 m/s    PV Max Velocity 1.0 m/s    PV Peak Gradient 4 mmHg    RVIDd 3.0 cm    TAPSE 1.6 (A) 1.7 cm    TV E Wave Velocity 0.6 m/s    Body Surface Area 1.97 m2    Fractional Shortening 2D 30 28 - 44 %    LVIDd Index 2.30 cm/m2    LVIDs Index 1.62 cm/m2    LV RWT Ratio 0.36     LV Mass 2D 137.8 88 - 224 g    LV Mass 2D Index 72.1 49 - 115 g/m2    MV E/A 0.82     LA Volume Index MOD A4C 24 16 - 34 ml/m2    LA Size Index 2.30 cm/m2    AV Velocity Ratio 0.64    Protime-INR    Collection Time: 01/09/24 10:36 AM   Result Value Ref Range    INR 2.34 (H) 0.85 - 1.13   POCT Glucose    Collection Time: 01/09/24 11:24 AM   Result Value Ref Range    POC Glucose 216 (H) 70 - 108 mg/dl   POCT Glucose    Collection Time: 01/09/24  3:39 PM   Result Value Ref Range    POC Glucose 167 (H) 70 - 108 mg/dl       CT  HEAD WO CONTRAST  Result Date: 1/8/2024  Findings: Left cerebral convexity acute subdural hematoma, up to 0.6 cm, previously 1.1 cm. There is redistribution of subdural hemorrhage posteriorly. Left-to-right midline shift up to 0.1 cm previously 0.3 cm. No acute intraparenchymal hemorrhage. No intraventricular hemorrhage. Atrophy and chronic small-vessel ischemic changes. No acute depressed skull fracture thickness. Paranasal sinuses and mastoid air cells are well aerated. Left scalp soft tissue swelling and hematoma, similar to prior study.   Impression: Acute left cerebral convexity subdural hematoma, up to 0.6 cm, decreased in thickness when compared to prior study. Minimal residual left-to-right midline shift, improved since prior study.      CT HEAD WO CONTRAST  Result Date: 1/7/2024  Findings: Left convexity acute subdural hematoma up to 1.1 cm, previously 0.7 cm, increased in size since the prior stud. New left to right midline shift up to 0.3 cm. Atrophy and chronic small-vessel ischemic changes. No acute intraparenchymal hematoma. No acute depressed skull fracture. Left frontotemporal scalp soft tissue swelling and hematoma. The mastoid air cells and visualized paranasal sinuses are well-aerated. Left gutierrez bullosa. prior bilateral cataract extractions.   Impression: Enlarging left convexity acute subdural hematoma, up to 1.1 cm, previously 0.7 cm. New left-to-right midline shift up to 0.3 cm.       Impression:  Subdural hematoma  Fall at home  Supra therapeutic INR on admission (10)  Hematoma left temple   Paroxysmal atrial fibrillation  HTN  HLD  NIDDMII  History of prostate cancer  History of dementia    Recommendations:  Continue current therapies  NeuroSx: hold warfarin. Keppra 500mg BID for one week, new CTH after 3 days (1/11 ordered) and then plan for 1 week CTH. Last CTH 1/8/24. OP f/u??  Monitor BP. Cardene gtt stopped 1/8. /88 afternoon   Patient is appropriate for IPR for the diagnosis of

## 2024-01-09 NOTE — PROGRESS NOTES
Physical Therapy   [facility]  INPATIENT PHYSICAL THERAPY  DAILY NOTE  CHARU PINEDAS 4A - 4A-10/010-A    Time In: 0850  Time Out: 0935  Timed Code Treatment Minutes: 45 Minutes  Minutes: 45          Date: 2024  Patient Name: Romario Marie,  Gender:  male        MRN: [id number]  : 1932  (89 y.o.)     Referring Practitioner: India Arzate PA-C  Diagnosis: SDH  Additional Pertinent Hx: Per EMR \"Patient presented after fall at home.  According to the wife he was helping with some laundry he was carrying a basket down the steps and when he got to the bottom he thought he was on the bottom step when there was 1 more and he missed stepped fell hitting his head.  There was no loss of consciousness.  He is on Coumadin for atrial fibrillation.  The wife says he has dementia but he is at his baseline.\" brain CT that showed acute subdural hemorrhage in the left side with a 7 mm in thickness     Prior Level of Function:  Lives With: Spouse  Type of Home: House  Home Layout: Two level, Bed/Bath upstairs  Home Access: Stairs to enter without rails  Entrance Stairs - Number of Steps: 2 WILLIAMS, flight of steps to 2nd floor with B rail  Home Equipment: Cane, Walker, rolling   Bathroom Shower/Tub: Tub/Shower unit, Walk-in shower  Bathroom Toilet: Handicap height  Bathroom Equipment: Built-in shower seat    ADL Assistance: Independent  Homemaking Assistance: Needs assistance  Ambulation Assistance: Independent  Transfer Assistance: Independent  Active : No  IADL Comments: currently uses tub shower, plans to use walk in shower, pt complete most of cooking tasks, wife completes most cleaning  Additional Comments: IND with use of SC in and out of the home. information obtained from wife.    Restrictions/Precautions:  Restrictions/Precautions: General Precautions, Fall Risk  Position Activity Restriction  Other position/activity restrictions: hx of dementia, Keep systolic blood pressure less  Handrail and needs heavy cues for step to sequence with ascending and descending steps      Functional Outcome Measures: Completed  AM-PAC Inpatient Mobility Raw Score : 17  AM-PAC Inpatient T-Scale Score : 42.13  Modified [place] Scale:  +4 - Moderately severe disability; unable to walk and attend to bodily needs without assistance    ASSESSMENT:  Assessment: Patient progressing toward established goals.  Activity Tolerance:  Patient tolerance of  treatment: good. Patient demos decrease safety awareness and stability with functional activities. He needs repeated one word cues to be safe with transfers and gait training today. Unable to process use of walker with significant verbal and tactile cues. Recommend IPR stay to address these deficits for fall and injury prevention.      Equipment Recommendations:Equipment Needed: No  Discharge Recommendations: Inpatient Rehabilitation  Plan: Current Treatment Recommendations: Strengthening, Balance training, Gait training, Stair training, Functional mobility training, Transfer training, Neuromuscular re-education, Equipment evaluation, education, & procurement, Endurance training, Patient/Caregiver education & training, Safety education & training, Therapeutic activities, Home exercise program  General Plan:  (5x N)    Education  Education:  Learners: Patient and Family  Patient Education: Plan of Care, Family Education, Bed Mobility, Transfers, Gait, Stairs, Use of Gait Belt, Verbal Exercise Instruction, Education Related to Stroke Diagnosis    Patient's wife educated on use of gait belt and hold of gait belt with transfers and gait. She was also educated on cueing patient with sequence for transfers, cane placement and safe donna with gait. As well as safe ascending and descending with stair navigation.     Goals:  Patient Goals : return home with wife  Short Term Goals  Time Frame for Short Term Goals: by discharge  Short Term Goal 1: Pt will amb for >200 feet with

## 2024-01-09 NOTE — PROGRESS NOTES
[facility]  Trauma Surgery - Dr. Noah Schwab  Daily Progress Note  Pt Name: Romario Marie  Medical Record Number: [id number]  Date of Birth 2/26/1932   Today's Date: 1/9/2024    HD: # 2    CC:     ASSESSMENT  1.  Active Hospital Problems    Diagnosis Date Noted    SDH (subdural hematoma) (HCC) [S06.5XAA] 01/08/2024    Subdural hemorrhage (HCC) [I62.00] 01/07/2024    Accidental fall on or from stairs or steps [W10.8XXA] 01/07/2024    Essential hypertension, benign [I10] 01/07/2024    Paroxysmal atrial fibrillation (HCC) [I48.0] 01/07/2024    Type 2 diabetes mellitus (HCC) [E11.9] 12/14/2018    Patient stable had no complaints no headache  Per  notes from January 8 planning home with wife declines need declines home health has a cane and walker already at home  Therapies already seen the patient recommending continued outpatient therapies  Can be discharged when medically stable hospitalist have not seen today neurosurgery has signed off     PLAN  Admitted to the ICU under Trauma services    - 01/08: May transfer out of ICU to step-down     Trauma by fall   - Fall precautions   - PT, OT eval and treat     Subdural Hematoma              - Initial head CT notes small SDH of the left temporal/parietal region with maximum thickness of 7 mm.  There is mixed density, suggesting subacute process versus acute on chronic.  No midline shift.    - Consult neurosurgery               - Neuro checks per unit routine              - Maintain systolic blood pressure between 100-160              - Trauma dose TXA NOT given; K-Centra given and 1 unit of FFP transfused              - Elevate head of bed approximately 30 degrees              - Hold all anticoagulant and antiplatelet medications              - Repeat head CT in 6 hours noted an enlarging left convexity acute SDH, up to 1.1 cm, previously 0.7 cm.  New left to right midline shift up to 0.3 cm              - Seizure precautions, Keppra 500 mg  BID for one week   - 01/08: Repeat head CT this AM notes acute left cerebral convexity SDH, up to 0.6 cm, decreased in thickness when compared to prior study.  Minimal residual left-to-right midline shift, improved since prior study.  Neurosurgery has cleared for transfer out of ICU to step-down.    - 01/09: Patient denies any complaints-> no headache, no dizziness, no visual changes, no chest pain/SOB, nausea/vomiting, or constipation. Is demented but per wife, is at baseline (oriented to person only).     Supratherapeutic INR   - Takes Coumadin  - INR on arrival 10.03   - Reversed with K-Centra and 1 unit of FFP   - Repeat INR 1.58 after above administered   - 01/08: INR 2.09   - INR pending today, continue to check daily      Hematoma Left temple               - Local wound care              - Ice prn for edema               - OTC prn for pain      Hx of HTN, dementia, DM, A fib and prostate cancer               - Intensivist consulted               - Resumed home meds except Aspirin and Xarelto              - Accuchecks AC&HS, sliding scale insulin    - 01/09: BP continues to be elevated     Referrals: neurosurgery, intensivist     Pain control    - Tylenol PRN    Carb control diet              IVF - stop   Prophylaxis: SCDs, pepcid, IS, C&DB, stool softener   Up with assistance  PT/OT/SLP to eval and treat when appropriate  Discharge disposition pending clinical course     SUBJECTIVE  Edward continues in the ICU.  He is up in the chair at the time of rounds.  He reports that he has some tenderness at the site of the hematoma to the left forehead, but otherwise denies any overt headache.  Wife is at the bedside as well and reports that the size of the hematoma has improved, however the ecchymosis has drifted down the patient's face.  Explained that this is to be expected.  Patient reports that he would like to go home today,  Case discussed with Dr. Schwab.       Wt Readings from Last 3 Encounters:   01/09/24  thickness of 7 mm. There is of mixed density, suggesting a subacute process versus possibly acute on chronic.. There is no midline shift. **This report has been created using voice recognition software.  It may contain minor errors which are inherent in voice recognition technology.** Final report electronically signed by Dr. Jair Soto on 1/7/2024 1:39 PM       15 Minutes spent in patient care collectively between subjective/objective examination, chart review, documentation, clinical reasoning and discussion with attending regarding plan/interval changes.    Electronically signed by HECTOR Jimenez CNP on 1/9/2024 at 10:27 AM      Patient seen and examined independently by me 1/9/2024     I personally supervised the PA/NP in the evaluation, management and development of the treatment plan for Romario Marie  on the same date of service as above.      I personally interviewed Romario Marie   and  discussed his review of symptoms as able due to the patient's condition, as well as performed an individual physical exam on the same   date of service as above.  In addition I discussed the patient's condition and treatment options with the patient, if able, and/or designated family if available.      I have also reviewed and agree with the past medical,  family and social history updates as well as care plans unless otherwise noted below.  All questions were answered.      I examined independently and reviewed relevant data myself and may have done so in the context of team rounds.  A full chart review was performed by me.       I attest that this medical record entry accurately reflects signatures and notations that I made in my capacity as an M.D. when I treated and diagnosed Romario Marie on the date of service above     I was responsible for all medical decision making involving this encounter.      I identified and/or confirmed all problems associated with this patient encounter by my own

## 2024-01-09 NOTE — PROGRESS NOTES
[facility]  [facility] 4A  Occupational Therapy  Daily Note  Time:      Time In: 1230  Time Out: 1248  Timed Code Treatment Minutes: 18 Minutes  Minutes: 18          Date: 2024  Patient Name: Romario aMrie,   Gender: male      Room: [facility]10/010-A  MRN: [id number]  : 1932  (89 y.o.)  Referring Practitioner: India Arzate PA-C  Diagnosis: SDH  Additional Pertinent Hx: Per EMR:  \"Patient presented after fall at home.  According to the wife he was helping with some laundry he was carrying a basket down the steps and when he got to the bottom he thought he was on the bottom step when there was 1 more and he missed stepped fell hitting his head.  There was no loss of consciousness.  He is on Coumadin for atrial fibrillation.  The wife says he has dementia but he is at his baseline.\" brain CT that showed acute subdural hemorrhage in the left side with a 7 mm in thickness    Restrictions/Precautions:  Restrictions/Precautions: General Precautions, Fall Risk  Position Activity Restriction  Other position/activity restrictions: hx of dementia, Keep systolic blood pressure less than 160 and above 100      SUBJECTIVE: cooperative, wife present for session    PAIN: 0/10: no c/o pain    Vitals: Blood Pressure: 152/72    COGNITION: Impaired Memory, Decreased Problem Solving, and Decreased Safety Awareness    ADL:   Grooming: Stand By Assistance.  Stood at sink x 3 min for brushing teeth, able to sequence without vcs  Footwear Management: Minimal Assistance.  Able to reach down to adjust slipper socks while seated in recliner .  **wife reports Pt does shower unassisted at home, just recently got a walk in shower with built in chair    BALANCE:  Standing Balance: Stand By Assistance. -CGA    BED MOBILITY:  Not Tested    TRANSFERS:  Sit to Stand:  Contact Guard Assistance.    Stand to Sit: Contact Guard Assistance.    **impulsive with standing then ambulating quickly across room, educated  Pt & wife on standing a minute prior to ambulation    FUNCTIONAL MOBILITY:  Assistive Device: Straight Cane  Assist Level:  Contact Guard Assistance.   Distance: To and from bathroom and + 20ft into hallway  Fair sequencing with cane, vcs to slow down   AM-PAC Inpatient Daily Activity Raw Score: 19  AM-PAC Inpatient ADL T-Scale Score : 40.22  ADL Inpatient CMS 0-100% Score: 42.8    Modified [place] Scale:  +4 - Moderately severe disability; unable to walk and attend to bodily needs without assistance    ASSESSMENT:     Activity Tolerance:  Patient tolerance of  treatment: Fair treatment tolerance       Discharge Recommendations: Inpatient Rehabilitation  Equipment Recommendations: Equipment Needed: No  Plan: Times Per Week: 5x  Current Treatment Recommendations: Strengthening, Balance training, Functional mobility training, Endurance training, Cognitive reorientation, Safety education & training, Patient/Caregiver education & training, Equipment evaluation, education, & procurement, Self-Care / ADL    Education:  Learners: Patient and Family  See above    Goals  Short Term Goals  Time Frame for Short Term Goals: by discharge  Short Term Goal 1: Pt will safely navigate household distances with supervision in prep for ADL completion.  Short Term Goal 2: Pt will complete BADL tasks with supervision to increase independence with self care tasks.  Short Term Goal 3: Pt will tolerate dynamic standing >5 minutes with supervision in prep for sinkside grooming tasks.  Long Term Goals  Time Frame for Long Term Goals : not set due to ELOS    Following session, patient left in safe position with all fall risk precautions in place.

## 2024-01-09 NOTE — PROGRESS NOTES
SBIRT screening completed per trauma protocol. SBIRT Findings are Negative.  Patient denies alcohol and/or drug use. Also denies depressive symptoms.    Brief Intervention and Referral to Treatment Summary N/A    Patient was provided PHQ-9, AUDIT-C and DAST Screening:      PHQ-9 Score:  Negative  AUDIT-C Score:  Negative  DAST Score:   Negative    Patient’s substance use is considered-Negative    Low Risk/Healthy  Moderate Risk  Harmful  Dependent    Patient’s depression is considered:-Negative    Minimal  Mild   Moderate  Moderately Severe  Severe    Brief Education Was Provided N/A    Patient was receptive  Patient was not receptive      Brief Intervention Is Provided (Only for AUDIT-C or DAST) N/A    Patient reports readiness to decrease and/or stop use and a plan was discussed   Patient denies readiness to decrease and/or stop use and a plan was not discussed    Injured Trauma Survivor Screening  1.  When you were injured or right afterward   Did you think you were going to die? RESPONSES; YES+1/NO: NO  Do you think this was done to you intentionally? NO    Since your injury  Have you felt more restless, tense or jumpy than usual? RESPONSES; YES+1/NO: NO  Have you found yourself unable to stop worrying? RESPONSES; YES+1/NO: NO  Do you find yourself thinking that the world is unsafe and that people are not to be trusted? RESPONSES; YES+1/NO: NO    TOTAL SCORE from ITSS Questions 1 and 2:   0  NOTE: A score of greater than or equal to 2 is considered positive for PTSD risk and is to receive a community resource packet to link with appropriate providers.    Recommendations/Referrals for Brief and/or Specialized Treatment Provided to Patient  N/A

## 2024-01-10 PROBLEM — I48.21 PERMANENT ATRIAL FIBRILLATION (HCC): Status: ACTIVE | Noted: 2024-01-10

## 2024-01-10 LAB
ANION GAP SERPL CALC-SCNC: 12 MEQ/L (ref 8–16)
BUN SERPL-MCNC: 9 MG/DL (ref 7–22)
CALCIUM SERPL-MCNC: 9.2 MG/DL (ref 8.5–10.5)
CHLORIDE SERPL-SCNC: 102 MEQ/L (ref 98–111)
CO2 SERPL-SCNC: 24 MEQ/L (ref 23–33)
CREAT SERPL-MCNC: 0.7 MG/DL (ref 0.4–1.2)
DEPRECATED RDW RBC AUTO: 44.8 FL (ref 35–45)
ERYTHROCYTE [DISTWIDTH] IN BLOOD BY AUTOMATED COUNT: 14 % (ref 11.5–14.5)
GFR SERPL CREATININE-BSD FRML MDRD: > 60 ML/MIN/1.73M2
GLUCOSE BLD STRIP.AUTO-MCNC: 147 MG/DL (ref 70–108)
GLUCOSE BLD STRIP.AUTO-MCNC: 152 MG/DL (ref 70–108)
GLUCOSE BLD STRIP.AUTO-MCNC: 157 MG/DL (ref 70–108)
GLUCOSE BLD STRIP.AUTO-MCNC: 206 MG/DL (ref 70–108)
GLUCOSE SERPL-MCNC: 156 MG/DL (ref 70–108)
HCT VFR BLD AUTO: 43.4 % (ref 42–52)
HGB BLD-MCNC: 14.6 GM/DL (ref 14–18)
INR PPP: 2.08 (ref 0.85–1.13)
MAGNESIUM SERPL-MCNC: 1.9 MG/DL (ref 1.6–2.4)
MCH RBC QN AUTO: 29.6 PG (ref 26–33)
MCHC RBC AUTO-ENTMCNC: 33.6 GM/DL (ref 32.2–35.5)
MCV RBC AUTO: 87.9 FL (ref 80–94)
PLATELET # BLD AUTO: 174 THOU/MM3 (ref 130–400)
PMV BLD AUTO: 10.3 FL (ref 9.4–12.4)
POTASSIUM SERPL-SCNC: 3.8 MEQ/L (ref 3.5–5.2)
RBC # BLD AUTO: 4.94 MILL/MM3 (ref 4.7–6.1)
SODIUM SERPL-SCNC: 138 MEQ/L (ref 135–145)
WBC # BLD AUTO: 7.9 THOU/MM3 (ref 4.8–10.8)

## 2024-01-10 PROCEDURE — 99231 SBSQ HOSP IP/OBS SF/LOW 25: CPT | Performed by: NURSE PRACTITIONER

## 2024-01-10 PROCEDURE — 97530 THERAPEUTIC ACTIVITIES: CPT

## 2024-01-10 PROCEDURE — 80048 BASIC METABOLIC PNL TOTAL CA: CPT

## 2024-01-10 PROCEDURE — 83735 ASSAY OF MAGNESIUM: CPT

## 2024-01-10 PROCEDURE — 6370000000 HC RX 637 (ALT 250 FOR IP)

## 2024-01-10 PROCEDURE — 6370000000 HC RX 637 (ALT 250 FOR IP): Performed by: STUDENT IN AN ORGANIZED HEALTH CARE EDUCATION/TRAINING PROGRAM

## 2024-01-10 PROCEDURE — 99232 SBSQ HOSP IP/OBS MODERATE 35: CPT | Performed by: SURGERY

## 2024-01-10 PROCEDURE — 85027 COMPLETE CBC AUTOMATED: CPT

## 2024-01-10 PROCEDURE — 36415 COLL VENOUS BLD VENIPUNCTURE: CPT

## 2024-01-10 PROCEDURE — 97535 SELF CARE MNGMENT TRAINING: CPT

## 2024-01-10 PROCEDURE — 2060000000 HC ICU INTERMEDIATE R&B

## 2024-01-10 PROCEDURE — 6370000000 HC RX 637 (ALT 250 FOR IP): Performed by: INTERNAL MEDICINE

## 2024-01-10 PROCEDURE — 6370000000 HC RX 637 (ALT 250 FOR IP): Performed by: PHYSICIAN ASSISTANT

## 2024-01-10 PROCEDURE — 2580000003 HC RX 258: Performed by: PHYSICIAN ASSISTANT

## 2024-01-10 PROCEDURE — 85610 PROTHROMBIN TIME: CPT

## 2024-01-10 PROCEDURE — 82948 REAGENT STRIP/BLOOD GLUCOSE: CPT

## 2024-01-10 PROCEDURE — 97116 GAIT TRAINING THERAPY: CPT

## 2024-01-10 RX ADMIN — POTASSIUM BICARBONATE 20 MEQ: 782 TABLET, EFFERVESCENT ORAL at 12:39

## 2024-01-10 RX ADMIN — INSULIN LISPRO 1 UNITS: 100 INJECTION, SOLUTION INTRAVENOUS; SUBCUTANEOUS at 17:11

## 2024-01-10 RX ADMIN — LEVETIRACETAM 500 MG: 500 TABLET, FILM COATED ORAL at 09:53

## 2024-01-10 RX ADMIN — SODIUM CHLORIDE, PRESERVATIVE FREE 10 ML: 5 INJECTION INTRAVENOUS at 20:21

## 2024-01-10 RX ADMIN — SODIUM CHLORIDE, PRESERVATIVE FREE 10 ML: 5 INJECTION INTRAVENOUS at 09:53

## 2024-01-10 RX ADMIN — ATORVASTATIN CALCIUM 10 MG: 10 TABLET, FILM COATED ORAL at 09:53

## 2024-01-10 RX ADMIN — LEVETIRACETAM 500 MG: 500 TABLET, FILM COATED ORAL at 20:21

## 2024-01-10 RX ADMIN — LISINOPRIL 10 MG: 10 TABLET ORAL at 09:53

## 2024-01-10 RX ADMIN — HYDROCHLOROTHIAZIDE 12.5 MG: 25 TABLET ORAL at 09:53

## 2024-01-10 ASSESSMENT — PAIN SCALES - GENERAL: PAINLEVEL_OUTOF10: 0

## 2024-01-10 NOTE — PROGRESS NOTES
[facility]  INPATIENT PHYSICAL THERAPY  DAILY NOTE  [facility] 4A - 4A-10/010-A    Time In: 0813  Time Out: 0840  Timed Code Treatment Minutes: 27 Minutes  Minutes: 27          Date: 1/10/2024  Patient Name: Romario Marie,  Gender:  male        MRN: [id number]  : 1932  (89 y.o.)     Referring Practitioner: India Arzate PA-C  Diagnosis: SDH  Additional Pertinent Hx: Per EMR \"Patient presented after fall at home.  According to the wife he was helping with some laundry he was carrying a basket down the steps and when he got to the bottom he thought he was on the bottom step when there was 1 more and he missed stepped fell hitting his head.  There was no loss of consciousness.  He is on Coumadin for atrial fibrillation.  The wife says he has dementia but he is at his baseline.\" brain CT that showed acute subdural hemorrhage in the left side with a 7 mm in thickness     Prior Level of Function:  Lives With: Spouse  Type of Home: House  Home Layout: Two level, Bed/Bath upstairs  Home Access: Stairs to enter without rails  Entrance Stairs - Number of Steps: 2 WILLIAMS, flight of steps to 2nd floor with B rail  Home Equipment: Cane, Walker, rolling   Bathroom Shower/Tub: Tub/Shower unit, Walk-in shower  Bathroom Toilet: Handicap height  Bathroom Equipment: Built-in shower seat    ADL Assistance: Independent  Homemaking Assistance: Needs assistance  Ambulation Assistance: Independent  Transfer Assistance: Independent  Active : No  IADL Comments: currently uses tub shower, plans to use walk in shower, pt complete most of cooking tasks, wife completes most cleaning  Additional Comments: IND with use of SC in and out of the home. information obtained from wife.    Restrictions/Precautions:  Restrictions/Precautions: General Precautions, Fall Risk  Position Activity Restriction  Other position/activity restrictions: hx of dementia, Keep systolic blood pressure less than 160 and above  Basye Scale:  +4 - Moderately severe disability; unable to walk and attend to bodily needs without assistance    ASSESSMENT:  Assessment: Patient progressing toward established goals.  Activity Tolerance:  Patient tolerance of  treatment: good. Pt demonstrates impairments with strength, balance, endurance, activity tolerance, independence, requires hands on assistance for safety with mobility and would benefit from continued skilled PT improve these impairments, to prevent falls and ensure safety with mobility, pt will greatly benefit from continued skilled PT.       Equipment Recommendations:Equipment Needed: No  Discharge Recommendations: Inpatient Rehabilitation  Plan: Current Treatment Recommendations: Strengthening, Balance training, Gait training, Stair training, Functional mobility training, Transfer training, Neuromuscular re-education, Equipment evaluation, education, & procurement, Endurance training, Patient/Caregiver education & training, Safety education & training, Therapeutic activities, Home exercise program  General Plan:  (5x N)    Education  Education:  Learners: Patient and Significant Other  Patient Education: Plan of Care, Transfers, Gait, Verbal Exercise Instruction    Goals:  Patient Goals : return home with wife  Short Term Goals  Time Frame for Short Term Goals: by discharge  Short Term Goal 1: Pt will amb for >200 feet with LRAD with S to progress towards PLOF.  Short Term Goal 2: Pt will demo sit to/from stand transfers with LRAD with S to progress towards PLOF.  Short Term Goal 3: Pt will demo IND with bed mobility tasks with bed flat to return  home safely.  Short Term Goal 4: Pt will demo S for stair negotiation for steps to enter home and flight of steps to 2nd floor with B rail for support to return home safely.  Short Term Goal 5: Pt will demo improved MAYES score by MDC of 6 to improve safety and decrease risk for falls.  Long Term Goals  Time Frame for Long Term Goals : NA due to

## 2024-01-10 NOTE — PROGRESS NOTES
Formerly named [facility]  Trauma Surgery - Dr. Noah Schwab  Daily Progress Note  Pt Name: Romario Marie  Medical Record Number: [id number]  Date of Birth 2/26/1932   Today's Date: 1/10/2024    HD: # 3    CC: No C/O    ASSESSMENT  1.  Active Hospital Problems    Diagnosis Date Noted    Permanent atrial fibrillation (HCC) [I48.21] 01/10/2024    Supratherapeutic INR [R79.1] 01/09/2024    SDH (subdural hematoma) (HCC) [S06.5XAA] 01/08/2024    Subdural hemorrhage (HCC) [I62.00] 01/07/2024    Accidental fall on or from stairs or steps [W10.8XXA] 01/07/2024    Essential hypertension, benign [I10] 01/07/2024    Paroxysmal atrial fibrillation (HCC) [I48.0] 01/07/2024    Type 2 diabetes mellitus (HCC) [E11.9] 12/14/2018        PLAN  Admitted to the ICU under Trauma services    - 01/08: Transferred out of ICU to     Trauma by fall   - Fall precautions   - PT, OT continue to treat     Subdural Hematoma              - Initial head CT notes small SDH of the left temporal/parietal region with maximum thickness of 7 mm.  There is mixed density, suggesting subacute process versus acute on chronic.  No midline shift.    - Consult neurosurgery               - Neuro checks per unit routine              - Maintain systolic blood pressure between 100-160              - Trauma dose TXA NOT given; K-Centra given and 1 unit of FFP transfused              - Elevate head of bed approximately 30 degrees              - Hold all anticoagulant and antiplatelet medications              - Repeat head CT in 6 hours noted an enlarging left convexity acute SDH, up to 1.1 cm, previously 0.7 cm.  New left to right midline shift up to 0.3 cm              - Seizure precautions, Keppra 500 mg BID for one week   - 01/08: Repeat head CT this AM notes acute left cerebral convexity SDH, up to 0.6 cm, decreased in thickness when compared to prior study.  Minimal residual left-to-right midline shift, improved since prior study.  Neurosurgery has  ecchymosis mostly to the outer and lower portions of the left eye      LABS  CBC :   Recent Labs     01/08/24  0614 01/09/24  0540 01/10/24  0508   WBC 8.1 8.0 7.9   HGB 14.6 14.6 14.6   HCT 43.1 43.7 43.4   MCV 88.5 87.9 87.9    173 174       BMP:   Recent Labs     01/08/24  0614 01/09/24  0540 01/10/24  0508    140 138   K 3.9 3.8 3.8    103 102   CO2 26 23 24   BUN 7 8 9   CREATININE 0.7 0.7 0.7       COAGS:   Recent Labs     01/07/24  2207 01/08/24  0614 01/09/24  1036 01/10/24  0508   APTT 37.7  --   --   --    INR 1.58* 2.09* 2.34* 2.08*       Pancreas/HFP:  No results for input(s): \"LIPASE\", \"AMYLASE\" in the last 72 hours.  No results for input(s): \"AST\", \"ALT\", \"BILIDIR\", \"BILITOT\", \"ALKPHOS\" in the last 72 hours.    RADIOLOGY:  CT HEAD WO CONTRAST    Result Date: 1/8/2024  CT Head without contrast Indication: Trauma. Follow-up subdural hematoma. Technique: CT head without contrast. Coronal and sagittal reformations. Comparison: CT/SR - CT HEAD WO CONTRAST - 01/07/2024 09:40 PM EST Findings: Left cerebral convexity acute subdural hematoma, up to 0.6 cm, previously 1.1 cm. There is redistribution of subdural hemorrhage posteriorly. Left-to-right midline shift up to 0.1 cm previously 0.3 cm. No acute intraparenchymal hemorrhage. No intraventricular hemorrhage. Atrophy and chronic small-vessel ischemic changes. No acute depressed skull fracture thickness. Paranasal sinuses and mastoid air cells are well aerated. Left scalp soft tissue swelling and hematoma, similar to prior study.     Impression: Acute left cerebral convexity subdural hematoma, up to 0.6 cm, decreased in thickness when compared to prior study. Minimal residual left-to-right midline shift, improved since prior study. This document has been electronically signed by: Flaquito Maxwell MD on 01/08/2024 05:18 AM All CTs at this facility use dose modulation techniques and iterative reconstructions, and/or weight-based dosing when

## 2024-01-10 NOTE — PROGRESS NOTES
[facility]  [facility] 4A  Occupational Therapy  Daily Note  Time:   Time In: 0900  Time Out: 940  Timed Code Treatment Minutes: 40 Minutes  Minutes: 40          Date: 1/10/2024  Patient Name: Romario Marie,   Gender: male      Room: [facility]10/010-A  MRN: [id number]  : 1932  (89 y.o.)  Referring Practitioner: India Arzate PA-C  Diagnosis: SDH  Additional Pertinent Hx: Per EMR:  \"Patient presented after fall at home.  According to the wife he was helping with some laundry he was carrying a basket down the steps and when he got to the bottom he thought he was on the bottom step when there was 1 more and he missed stepped fell hitting his head.  There was no loss of consciousness.  He is on Coumadin for atrial fibrillation.  The wife says he has dementia but he is at his baseline.\" brain CT that showed acute subdural hemorrhage in the left side with a 7 mm in thickness    Restrictions/Precautions:  Restrictions/Precautions: General Precautions, Fall Risk  Position Activity Restriction  Other position/activity restrictions: hx of dementia, Keep systolic blood pressure less than 160 and above 100     SUBJECTIVE: RN okkeithed OT session. Pt. In room and agreeable to participate, spouse present.  Both Pt. And spouse very pleasant spouse informative of Pt. Recent fall and PLOF in the home.     PAIN: Denies any pain     Vitals: Vitals not assessed per clinical judgement, see nursing flowsheet    COGNITION: Impaired Memory, Decreased Problem Solving, and Decreased Safety Awareness    ADL:   Grooming: Contact Guard Assistance and with set-up.  To stand at sink and complete oral care  Bathing: Stand By Assistance, with set-up, with verbal cues , and with increased time for completion.  To complete sponge bath with CGA for standing components.   Upper Extremity Dressing: Minimal Assistance.  To don hospital gown  Lower Extremity Dressing: Contact Guard Assistance and with set-up.  To don

## 2024-01-10 NOTE — PROGRESS NOTES
[facility]  INPATIENT REHABILITATION  ADMISSIONS COORDINATOR CONSULT    Referral Type: internal    Patient Name: Romario Marie      MRN: [id number]    : 1932  (89 y.o.)  Gender: male   Race:White (non-)     Payor Source: Payor: [employer] MEDICARE / Plan: [employer] CHOICE-PPO MEDICARE / Product Type: *No Product type* /   Secondary Payor Source:      Isolation Status: No active isolations    Lives With: Spouse  Type of Home: House  Home Layout: Two level, Bed/Bath upstairs  Home Access: Stairs to enter without rails  Entrance Stairs - Number of Steps: 2 WILLIAMS, flight of steps to 2nd floor with B rail        Additional Comments: IND with use of SC in and out of the home. information obtained from wife.    What is treatment plan?  Disciplines Required upon Admission to Inpatient Rehabilitation: Physical Therapy and Occupational Therapy  Post operative: No  Fall: Yes  Dialysis: No  Diet: ADULT DIET; Regular; 4 carb choices (60 gm/meal)  Discussed patient with  and PM&R provider: Patient is appropriate for IPR.  Precert sent.

## 2024-01-10 NOTE — CARE COORDINATION
1/10/24, 1:57 PM EST    DISCHARGE ON GOING EVALUATION    Romario POOLE [facility] day: 3  Location: 4A-10/010-A Reason for admit: SDH (subdural hematoma) (HCC) [S06.5XAA]   Procedure:   1/7 CT Head: Moderate size focal hematoma/soft tissue swelling left frontal/parietal region; Small subdural hematoma left temporal/parietal region with a maximum thickness of 7 mm. There is of mixed density, suggesting a subacute process versus possibly acute on chronic.. There is no midline shift.  1/7 CT Cervical Spine: No acute findings  1/7 Repeat CT Head: Enlarging left convexity acute subdural hematoma, up to 1.1 cm, previously 0.7 cm. New left-to-right midline shift up to 0.3 cm.  1/8 CT Head: Acute left cerebral convexity subdural hematoma, up to 0.6 cm, decreased   in thickness when compared to prior study. Minimal residual left-to-right midline shift, improved since prior study.  1/8 ECHO EF 55-60%  Barriers to Discharge: Diabetes management, Physiatry following, PT/OT/SLP,  Neurosurgery signed off, will follow prn, Lipitor, Keppra, Lisinopril, HCTZ, pain and nausea control, electrolyte replacement protocols.   PCP: Steve Santoro MD  Readmission Risk Score: 10%  Patient Goals/Plan/Treatment Preferences: From home with spouse. Await precert for IPR.

## 2024-01-10 NOTE — PROGRESS NOTES
Internal Medicine Resident Progress Note    Name: Romario Marie, male, : 1932, MRN: [id number]    PCP: Steve Santoro MD    Date of Admission: 2024  Date of Service: Pt seen/examined on 24      Assessment/Plan:  Subdural hematoma, 2/2 mechanical fall isoper therapeutic INR: Presented trauma alert.  CT head with moderate focal hematoma left frontal/parietal region and small subdural hematoma left temporal/parietal region with thickness 7 mm, mixed density and no midline shift.  Initial INR 10.03, s/p Kcentra x 1 and FFP.  Neurosurgery and trauma following.  Neurochecks every 4 hours, NIHSS every shift.  Started on Keppra 500 mg p.o. twice daily.  Per patient's wife, patient  was previously taking warfarin incorrectly, 5 mg daily versus 2.5 mg on  and 5 mg . Repeat CT head demonstrated acute left cerebral convexity subdural hematoma, up to 0.6 cm, decrease in thickness when compared to prior study.  Minimal residual left-to-right midline shift, improved since prior study.  GCS 15  Fall and seizure precautions in place.    Maintain systolic blood pressure less than 160.    Patient scheduled for repeat CT scan on  to assess for hematoma at this  Paroxysmal atrial fibrillation: [id number].  On Coumadin for anticoagulation, no rate or rhythm control agents. Initial INR supratherapeutic in setting of bleed, as noted above.    Patient currently in A-fib on monitors   Patient may benefit from watchman, will discuss with patient  Holding anticoagulation, SCD placed.    Placed on continuous telemetry.    INR 2.34 on   HTN: Per neurology recommendations systolic blood pressure less than 160, will maintain  Patient lisinopril/HCTZ resumed   Chronic:  HLD: Started Lipitor 10 mg p.o. daily  NIDDMII: No HbA1c on record. Home antidiabetic regimen includes saxagliptin and metformin.  Continue sliding scale insulin with POCT glucose checks,  prior study. This document has been electronically signed by: Flaquito Maxwell MD on 01/08/2024 05:18 AM All CTs at this facility use dose modulation techniques and iterative reconstructions, and/or weight-based dosing when appropriate to reduce radiation to a low as reasonably achievable.    CT HEAD WO CONTRAST    Result Date: 1/7/2024  ADDENDUM #1   This report was discussed with Jayjay Taylor RN on Jan 07, 2024 22:39:00 EST. This document has been electronically signed by: Mehnaz Gavin on 01/07/2024 10:40 PM   ORIGINAL REPORT   CT Head without contrast Indication: Trauma follow-up. subdural hematoma. Technique: CT head without contrast. Coronal and sagittal reformations. Comparison: CT/KO/SR - CT HEAD WO CONTRAST - 01/07/2024 01:23 PM EST Findings: Left convexity acute subdural hematoma up to 1.1 cm, previously 0.7 cm, increased in size since the prior stud. New left to right midline shift up to 0.3 cm. Atrophy and chronic small-vessel ischemic changes. No acute intraparenchymal hematoma. No acute depressed skull fracture. Left frontotemporal scalp soft tissue swelling and hematoma. The mastoid air cells and visualized paranasal sinuses are well-aerated. Left gutierrez bullosa. prior bilateral cataract extractions.     Impression: Enlarging left convexity acute subdural hematoma, up to 1.1 cm, previously 0.7 cm. New left-to-right midline shift up to 0.3 cm. This document has been electronically signed by: Flaquito Maxwell MD on 01/07/2024 10:33 PM All CTs at this facility use dose modulation techniques and iterative reconstructions, and/or weight-based dosing when appropriate to reduce radiation to a low as reasonably achievable.      DVT prophylaxis:    [] Lovenox  [x] SCDs  [] SQ Heparin  [] Encourage ambulation   [] Already on Anticoagulation  Diet: ADULT DIET; Regular; 4 carb choices (60 gm/meal)  Code Status: Full Code  PT/OT: Yes  Tele: Yes   IVF: No    Electronically signed by Tim Hughes MD on 1/9/2024 at 7:06

## 2024-01-10 NOTE — PROGRESS NOTES
Physical Medicine & Rehabilitation   Progress Note    Chief Complaint:  SDH. Rehab needs     History of Present Illness:  Romario Marie is a 89 y.o. male admitted to [facility] on 1/7/2024. Patient  has a past medical history of Diabetes mellitus (HCC), Essential hypertension, H/O prostate cancer, Hyperlipidemia, and Paroxysmal atrial fibrillation (HCC). Patient presented to [facility] ER after suffering a fall at home. Patient was carrying a basket down stairs and missed the bottom steps, causing a fall. Patient with atrial fibrillation and on coumadin.  CT head with moderate focal hematoma left frontal/parietal region and small subdural hematoma left temporal/parietal region with thickness 7 mm, mixed density and no midline shift.  Initial INR 10.03, s/p Kcentra x 1 and FFP. Patient was admitted to ICU. Repeat INR was 2. F/u CTH demonstrated acute left cerebral convexity subdural hematoma, up to 0.6 cm, decrease in thickness when compared to prior study, Minimal residual left-to-right midline shift, improved since prior study. Patient was evaluated by neurosurgery. No surgery planned. Keppra BID for one week, hold coumadin, plan f/u CTH in 3 days and 1 week. Patient with need for Cardene gtt for BP, this was stopped 1/8. Patient as transferred out of ICU to neuro stepdown on 1/8/24. Patient with SLP evaluation, wife noted to be at baseline due to dementia, but new findings of issues with sequencing with mobility noted with PT and OT. PM&R was consulted to evaluate further for IPR consideration.     1/9/24: Patient seen today in consult. Patient sitting up in chair. Wife present. Patient with noted hematoma over left scalp and ecchymosis around eye and down left face. Patient denies any headache. Patient is very pleasant. Discussed concerns with therapy and needing further therapy prior to discharge. Patient understanding. Despite dementia at baseline, patient was pretty helpful around the house and  clearance s/t relevance for dementia with no anticipated new learning skills; spouse reports progression towards baseline cognitive performance. Expressive and receptive language domains mildly impaired, most notably with functional comprehension measures; peripheral monitoring of expressive language skills encouraged d/t presence of anomia (to be anticipated). Speech intelligibility best approximates 100%, absence for dysarthria. Skilled ST services are recommended to address the above aforementioned deficits to improve functional cognitive linguistic skills for optimal re-integration into home enviornment.     Review of Systems:  CONSTITUTIONAL:  positive for  fatigue  EYES:  negative for  double vision, blurred vision, blind spots, and visual disturbance  HEENT:  negative  RESPIRATORY:  negative  CARDIOVASCULAR:  Afib, now off warfarin  GASTROINTESTINAL:  negative for nausea, vomiting, and constipation  GENITOURINARY:  negative  SKIN:  bruising  HEMATOLOGIC/LYMPHATIC:  negative  MUSCULOSKELETAL:  positive for  muscle weakness  NEUROLOGICAL:  positive for memory problems, gait problems, and weakness  BEHAVIOR/PSYCH:  negative  System review otherwise negative    Physical Exam:  /66   Pulse 88   Temp 98.4 °F (36.9 °C) (Oral)   Resp 16   Ht 1.753 m (5' 9\")   Wt 75 kg (165 lb 5.5 oz)   SpO2 94%   BMI 24.42 kg/m²   awake  Orientation:   person  Mood: within normal limits  Affect: calm  General appearance: Patient is well nourished, well developed, well groomed and in no acute distress    Memory:   abnormal - deficits noted with conversation, not formally testing.    Attention/Concentration: normal  Language:  normal, clear, english accent    Cranial Nerves:  no obvious deficits noted  ROM:  normal  Tone:  normal  Muscle bulk: within normal limits  Sensory:  Sensory intact    Heart: no shortness of breath, extremities well perfused  Lungs: no audible wheezing or crackles, no increased WOB  Skin: warm and  Date: 1/8/2024  Findings: Left cerebral convexity acute subdural hematoma, up to 0.6 cm, previously 1.1 cm. There is redistribution of subdural hemorrhage posteriorly. Left-to-right midline shift up to 0.1 cm previously 0.3 cm. No acute intraparenchymal hemorrhage. No intraventricular hemorrhage. Atrophy and chronic small-vessel ischemic changes. No acute depressed skull fracture thickness. Paranasal sinuses and mastoid air cells are well aerated. Left scalp soft tissue swelling and hematoma, similar to prior study.   Impression: Acute left cerebral convexity subdural hematoma, up to 0.6 cm, decreased in thickness when compared to prior study. Minimal residual left-to-right midline shift, improved since prior study.      CT HEAD WO CONTRAST  Result Date: 1/7/2024  Findings: Left convexity acute subdural hematoma up to 1.1 cm, previously 0.7 cm, increased in size since the prior stud. New left to right midline shift up to 0.3 cm. Atrophy and chronic small-vessel ischemic changes. No acute intraparenchymal hematoma. No acute depressed skull fracture. Left frontotemporal scalp soft tissue swelling and hematoma. The mastoid air cells and visualized paranasal sinuses are well-aerated. Left gutierrez bullosa. prior bilateral cataract extractions.   Impression: Enlarging left convexity acute subdural hematoma, up to 1.1 cm, previously 0.7 cm. New left-to-right midline shift up to 0.3 cm.       Impression:  Subdural hematoma  Fall at home  Supra therapeutic INR on admission (10)  Hematoma left temple   Paroxysmal atrial fibrillation  HTN  HLD  NIDDMII  History of prostate cancer  History of dementia    Recommendations:  Continue current therapies  NeuroSx: hold warfarin. Keppra 500mg BID for one week, new CTH after 3 days (1/11 ordered) and then plan for 1 week CTH. Last CTH 1/8/24. OP f/u??  Monitor BP. Improved today  Awaiting precert for IPR        Maria M Dacosta, HECTOR - CNP

## 2024-01-10 NOTE — PROGRESS NOTES
Physician Progress Note      PATIENT:               PRATIK KNIGHT  CSN #:                  [id number]  :                       1932  ADMIT DATE:       2024 12:50 PM  DISCH DATE:  RESPONDING  PROVIDER #:        ALESSANDRO YOUNG - BROOKS          QUERY TEXT:    Patient admitted with SDH and is on chronic anticoagulation with Coumadin.     If possible, please document in the progress notes and discharge summary if   you are evaluating and/or treating any of the following:    The medical record reflects the following:  Risk Factors: SDH post fall  Clinical Indicators: presented post fall with SDH while on Coumadin for atrial   fibrillation, INR on arrival was 10.03  Treatment: Labs, imaging, monitoring, holding Coumadin, NS consult  Options provided:  -- SDH associated with Coumadin  -- Bleeding unrelated to anticoagulation  -- Other - I will add my own diagnosis  -- Disagree - Not applicable / Not valid  -- Disagree - Clinically unable to determine / Unknown  -- Refer to Clinical Documentation Reviewer    PROVIDER RESPONSE TEXT:    This patient has SDH associated with Coumadin.    Query created by: Lolly Valera on 2024 7:49 AM      QUERY TEXT:    Pt admitted with SDH.  Pt noted to have midline shift on CT head.  If   clinically significant, please document in progress notes and discharge   summary if you are evaluating/treating any of the following:    The medical record reflects the following:  Risk Factors: SDH  Clinical Indicators: CT head shows new midline shift  with improvement in   midline shift on CT head   Treatment: Labs, imaging, monitoring, NS consult  Options provided:  -- Brain compression  -- Traumatic brain compression  -- Other - I will add my own diagnosis  -- Disagree - Not applicable / Not valid  -- Disagree - Clinically unable to determine / Unknown  -- Refer to Clinical Documentation Reviewer    PROVIDER RESPONSE TEXT:    This patient has brain compression.    Query  created by: Lolly Valera on 1/9/2024 7:51 AM      Electronically signed by:  ALESSANDRO YOUNG - CNP 1/10/2024 8:54 AM

## 2024-01-10 NOTE — PROGRESS NOTES
Rounded on unit, introduced self to patient and wife at bedside.  Explained to the couple precert was submitted and explained precert process.

## 2024-01-10 NOTE — PROGRESS NOTES
[facility]  INPATIENT REHABILITATION UNIT  PRECERTIFICATION TEMPLATE    Date of Admission: 2024 12:50 PM    Patient Name: Pratik Marie      MRN: [id number]    : 1932  (89 y.o.)  Gender: male     Payor Source: Payor: [employer] MEDICARE / Plan: [employer] CHOICE-PPO MEDICARE / Product Type: *No Product type* /   Secondary Payor Source:      Isolation Status: No active isolations    Precert initiated for Inpatient Rehab Admission at [facility].    Reference Number:  [id number]  Route of Precertification Transaction: [employer]              Transaction ID: [id number]  Customer ID: [id number]  Transaction Date: 2024-01-10   Helpful Hint: Review [employer] policy information to avoid unnecessary requests  PRATIK MARIE Patient    Member ID  [id number]  Date of Birth  1932  Gender  Male    Eligibility Status  Active Coverage  Group Number  [id number]  Plan / Coverage Date  2022    Transaction Type  Inpatient Authorization  Organization  [facility]  Payer  [employer]      Print Add Clinical Documents Save New Template  Certificate Information  Reference Number  [id number]  Status  PENDED    Review Reason 1  Requires Medical Review    Message  This case requires further review. You will be contacted if additional information is needed.

## 2024-01-10 NOTE — PROGRESS NOTES
Internal Medicine Resident Progress Note    Name: Romario Marie, male, : 1932, MRN: [id number]    PCP: Steve Santoro MD    Date of Admission: 2024  Date of Service: Pt seen/examined on 01/10/24      Assessment/Plan:  Subdural hematoma, 2/2 mechanical fall isoper therapeutic INR: Presented trauma alert.  CT head with moderate focal hematoma left frontal/parietal region and small subdural hematoma left temporal/parietal region with thickness 7 mm, mixed density and no midline shift.  Initial INR 10.03, s/p Kcentra x 1 and FFP.  Neurosurgery and trauma following.  Neurochecks every 4 hours, NIHSS every shift.  Started on Keppra 500 mg p.o. twice daily.  Per patient's wife, patient  was previously taking warfarin incorrectly, 5 mg daily versus 2.5 mg on  and 5 mg . Repeat CT head demonstrated acute left cerebral convexity subdural hematoma, up to 0.6 cm, decrease in thickness when compared to prior study.  Minimal residual left-to-right midline shift, improved since prior study.  GCS 15  Holding warfarin and aspirin at this time 2/2 subdural hematoma, SCDs for DVT prophylaxis  Continue Keppra as noted above  Fall and seizure precautions in place.    Maintain systolic blood pressure less than 160.    Patient scheduled for repeat CT scan on  to assess for hematoma  Paroxysmal atrial fibrillation: [id number] 4.  On Coumadin for anticoagulation, no rate or rhythm control agents. Initial INR supratherapeutic in setting of bleed, as noted above.    Patient currently in A-fib on monitors   Patient may benefit from watchman, will discuss with patient  Holding anticoagulation, SCD placed.    Placed on continuous telemetry.    INR 2.34 on   HTN: Per neurology recommendations will maintain systolic blood pressure less than 160  Patient lisinopril/HCTZ resumed   Chronic:  HLD: Started Lipitor 10 mg p.o. daily  NIDDMII: No HbA1c on record. Home    Capillary Refill: Brisk,< 3 seconds.  Peripheral Pulses: +2 palpable, equal bilaterally.       Labs:   Recent Labs     01/08/24  0614 01/09/24  0540 01/10/24  0508   WBC 8.1 8.0 7.9   HGB 14.6 14.6 14.6   HCT 43.1 43.7 43.4    173 174       Recent Labs     01/08/24  0614 01/09/24  0540 01/10/24  0508    140 138   K 3.9 3.8 3.8    103 102   CO2 26 23 24   BUN 7 8 9   CREATININE 0.7 0.7 0.7   CALCIUM 9.2 9.1 9.2       No results for input(s): \"AST\", \"ALT\", \"BILIDIR\", \"BILITOT\", \"ALKPHOS\" in the last 72 hours.  Recent Labs     01/08/24  0614 01/09/24  1036 01/10/24  0508   INR 2.09* 2.34* 2.08*       No results for input(s): \"TROPONINT\" in the last 72 hours.  No results for input(s): \"PROCAL\" in the last 72 hours.   Lab Results   Component Value Date/Time    NITRU NEGATIVE 12/14/2018 11:15 PM    WBCUA 0-2 12/14/2018 11:15 PM    BACTERIA NONE 12/14/2018 11:15 PM    RBCUA 0-2 12/14/2018 11:15 PM    BLOODU NEGATIVE 12/14/2018 11:15 PM    GLUCOSEU 100 12/14/2018 11:15 PM       Radiology:  see assessment and plan for discussion of pertinent imaging.  (Use [id number] dot phrase for last 24 hrs)  Echo (TTE) complete (PRN contrast/bubble/strain/3D)    Result Date: 1/9/2024    Left Ventricle: Normal left ventricular systolic function with a visually estimated EF of 55 - 60%. Left ventricle size is normal. Normal wall thickness. Normal wall motion. Normal diastolic function.   Aortic Valve: Not well visualized. Mildly calcified cusp.   Image quality is technically difficult. Technically difficult study.       DVT prophylaxis:    [] Lovenox  [x] SCDs  [] SQ Heparin  [] Encourage ambulation   [] Already on Anticoagulation  Diet: ADULT DIET; Regular; 4 carb choices (60 gm/meal)  Code Status: Full Code  PT/OT: Yes  Tele: Yes   IVF: No    Electronically signed by Tim Hughes MD on 1/10/2024 at 4:13 PM    Case was discussed with Attending, Dr. Schrader

## 2024-01-11 ENCOUNTER — APPOINTMENT (OUTPATIENT)
Dept: CT IMAGING | Age: 89
DRG: 813 | End: 2024-01-11
Payer: MEDICARE

## 2024-01-11 ENCOUNTER — TELEPHONE (OUTPATIENT)
Dept: CARDIOLOGY CLINIC | Age: 89
End: 2024-01-11

## 2024-01-11 ENCOUNTER — HOSPITAL ENCOUNTER (INPATIENT)
Age: 89
LOS: 12 days | Discharge: HOME | DRG: 950 | End: 2024-01-23
Attending: STUDENT IN AN ORGANIZED HEALTH CARE EDUCATION/TRAINING PROGRAM | Admitting: STUDENT IN AN ORGANIZED HEALTH CARE EDUCATION/TRAINING PROGRAM
Payer: MEDICARE

## 2024-01-11 VITALS
SYSTOLIC BLOOD PRESSURE: 109 MMHG | HEART RATE: 90 BPM | OXYGEN SATURATION: 94 % | HEIGHT: 69 IN | TEMPERATURE: 97.5 F | RESPIRATION RATE: 18 BRPM | WEIGHT: 164.8 LBS | BODY MASS INDEX: 24.41 KG/M2 | DIASTOLIC BLOOD PRESSURE: 64 MMHG

## 2024-01-11 DIAGNOSIS — I48.21 PERMANENT ATRIAL FIBRILLATION (HCC): ICD-10-CM

## 2024-01-11 DIAGNOSIS — S06.5XAA SUBDURAL HEMATOMA (HCC): Primary | ICD-10-CM

## 2024-01-11 DIAGNOSIS — I10 ESSENTIAL HYPERTENSION, BENIGN: ICD-10-CM

## 2024-01-11 DIAGNOSIS — I48.0 PAROXYSMAL ATRIAL FIBRILLATION (HCC): ICD-10-CM

## 2024-01-11 PROBLEM — R79.1 SUPRATHERAPEUTIC INR: Status: RESOLVED | Noted: 2024-01-09 | Resolved: 2024-01-11

## 2024-01-11 LAB
ANION GAP SERPL CALC-SCNC: 13 MEQ/L (ref 8–16)
BUN SERPL-MCNC: 16 MG/DL (ref 7–22)
CALCIUM SERPL-MCNC: 9.1 MG/DL (ref 8.5–10.5)
CHLORIDE SERPL-SCNC: 101 MEQ/L (ref 98–111)
CO2 SERPL-SCNC: 24 MEQ/L (ref 23–33)
CREAT SERPL-MCNC: 0.8 MG/DL (ref 0.4–1.2)
DEPRECATED RDW RBC AUTO: 45.3 FL (ref 35–45)
ERYTHROCYTE [DISTWIDTH] IN BLOOD BY AUTOMATED COUNT: 14.1 % (ref 11.5–14.5)
GFR SERPL CREATININE-BSD FRML MDRD: > 60 ML/MIN/1.73M2
GLUCOSE BLD STRIP.AUTO-MCNC: 131 MG/DL (ref 70–108)
GLUCOSE BLD STRIP.AUTO-MCNC: 140 MG/DL (ref 70–108)
GLUCOSE BLD STRIP.AUTO-MCNC: 144 MG/DL (ref 70–108)
GLUCOSE SERPL-MCNC: 150 MG/DL (ref 70–108)
HCT VFR BLD AUTO: 43 % (ref 42–52)
HGB BLD-MCNC: 14.6 GM/DL (ref 14–18)
INR PPP: 1.8 (ref 0.85–1.13)
MAGNESIUM SERPL-MCNC: 1.7 MG/DL (ref 1.6–2.4)
MCH RBC QN AUTO: 29.9 PG (ref 26–33)
MCHC RBC AUTO-ENTMCNC: 34 GM/DL (ref 32.2–35.5)
MCV RBC AUTO: 88.1 FL (ref 80–94)
PLATELET # BLD AUTO: 163 THOU/MM3 (ref 130–400)
PMV BLD AUTO: 10.3 FL (ref 9.4–12.4)
POTASSIUM SERPL-SCNC: 3.6 MEQ/L (ref 3.5–5.2)
RBC # BLD AUTO: 4.88 MILL/MM3 (ref 4.7–6.1)
SODIUM SERPL-SCNC: 138 MEQ/L (ref 135–145)
WBC # BLD AUTO: 7.2 THOU/MM3 (ref 4.8–10.8)

## 2024-01-11 PROCEDURE — 1180000000 HC REHAB R&B

## 2024-01-11 PROCEDURE — 97530 THERAPEUTIC ACTIVITIES: CPT

## 2024-01-11 PROCEDURE — 83735 ASSAY OF MAGNESIUM: CPT

## 2024-01-11 PROCEDURE — 6370000000 HC RX 637 (ALT 250 FOR IP)

## 2024-01-11 PROCEDURE — 85610 PROTHROMBIN TIME: CPT

## 2024-01-11 PROCEDURE — 70450 CT HEAD/BRAIN W/O DYE: CPT

## 2024-01-11 PROCEDURE — 36415 COLL VENOUS BLD VENIPUNCTURE: CPT

## 2024-01-11 PROCEDURE — 6360000002 HC RX W HCPCS

## 2024-01-11 PROCEDURE — 82948 REAGENT STRIP/BLOOD GLUCOSE: CPT

## 2024-01-11 PROCEDURE — 85027 COMPLETE CBC AUTOMATED: CPT

## 2024-01-11 PROCEDURE — 6370000000 HC RX 637 (ALT 250 FOR IP): Performed by: PHYSICIAN ASSISTANT

## 2024-01-11 PROCEDURE — 6370000000 HC RX 637 (ALT 250 FOR IP): Performed by: STUDENT IN AN ORGANIZED HEALTH CARE EDUCATION/TRAINING PROGRAM

## 2024-01-11 PROCEDURE — 99222 1ST HOSP IP/OBS MODERATE 55: CPT | Performed by: STUDENT IN AN ORGANIZED HEALTH CARE EDUCATION/TRAINING PROGRAM

## 2024-01-11 PROCEDURE — 2580000003 HC RX 258: Performed by: NURSE PRACTITIONER

## 2024-01-11 PROCEDURE — 97116 GAIT TRAINING THERAPY: CPT

## 2024-01-11 PROCEDURE — 97110 THERAPEUTIC EXERCISES: CPT

## 2024-01-11 PROCEDURE — 99239 HOSP IP/OBS DSCHRG MGMT >30: CPT | Performed by: SURGERY

## 2024-01-11 PROCEDURE — 80048 BASIC METABOLIC PNL TOTAL CA: CPT

## 2024-01-11 PROCEDURE — APPNB15 APP NON BILLABLE TIME 0-15 MINS: Performed by: OCCUPATIONAL THERAPIST

## 2024-01-11 PROCEDURE — 2580000003 HC RX 258: Performed by: PHYSICIAN ASSISTANT

## 2024-01-11 PROCEDURE — 6370000000 HC RX 637 (ALT 250 FOR IP): Performed by: NURSE PRACTITIONER

## 2024-01-11 RX ORDER — ACETAMINOPHEN 325 MG/1
650 TABLET ORAL EVERY 4 HOURS PRN
Status: CANCELLED | OUTPATIENT
Start: 2024-01-11

## 2024-01-11 RX ORDER — ACETAMINOPHEN 325 MG/1
650 TABLET ORAL EVERY 4 HOURS PRN
Status: DISCONTINUED | OUTPATIENT
Start: 2024-01-11 | End: 2024-01-23 | Stop reason: HOSPADM

## 2024-01-11 RX ORDER — M-VIT,TX,IRON,MINS/CALC/FOLIC 27MG-0.4MG
1 TABLET ORAL DAILY
COMMUNITY

## 2024-01-11 RX ORDER — INSULIN LISPRO 100 [IU]/ML
0-4 INJECTION, SOLUTION INTRAVENOUS; SUBCUTANEOUS
Status: CANCELLED | OUTPATIENT
Start: 2024-01-11

## 2024-01-11 RX ORDER — LEVETIRACETAM 500 MG/1
500 TABLET ORAL 2 TIMES DAILY
Status: COMPLETED | OUTPATIENT
Start: 2024-01-11 | End: 2024-01-14

## 2024-01-11 RX ORDER — INSULIN LISPRO 100 [IU]/ML
0-4 INJECTION, SOLUTION INTRAVENOUS; SUBCUTANEOUS
Status: DISCONTINUED | OUTPATIENT
Start: 2024-01-11 | End: 2024-01-11

## 2024-01-11 RX ORDER — LISINOPRIL 10 MG/1
10 TABLET ORAL DAILY
Status: CANCELLED | OUTPATIENT
Start: 2024-01-12

## 2024-01-11 RX ORDER — METFORMIN HYDROCHLORIDE 500 MG/1
500 TABLET, EXTENDED RELEASE ORAL
COMMUNITY

## 2024-01-11 RX ORDER — POLYETHYLENE GLYCOL 3350 17 G/17G
17 POWDER, FOR SOLUTION ORAL DAILY PRN
Status: DISCONTINUED | OUTPATIENT
Start: 2024-01-11 | End: 2024-01-23 | Stop reason: HOSPADM

## 2024-01-11 RX ORDER — IBUPROFEN 600 MG/1
1 TABLET ORAL PRN
Status: CANCELLED | OUTPATIENT
Start: 2024-01-11

## 2024-01-11 RX ORDER — ONDANSETRON 4 MG/1
4 TABLET, ORALLY DISINTEGRATING ORAL EVERY 8 HOURS PRN
Status: CANCELLED | OUTPATIENT
Start: 2024-01-11

## 2024-01-11 RX ORDER — LEVETIRACETAM 500 MG/1
500 TABLET ORAL 2 TIMES DAILY
Status: CANCELLED | OUTPATIENT
Start: 2024-01-11 | End: 2024-01-14

## 2024-01-11 RX ORDER — ATORVASTATIN CALCIUM 10 MG/1
10 TABLET, FILM COATED ORAL DAILY
Status: DISCONTINUED | OUTPATIENT
Start: 2024-01-12 | End: 2024-01-23 | Stop reason: HOSPADM

## 2024-01-11 RX ORDER — POTASSIUM CHLORIDE 29.8 MG/ML
20 INJECTION INTRAVENOUS PRN
Status: DISCONTINUED | OUTPATIENT
Start: 2024-01-11 | End: 2024-01-11

## 2024-01-11 RX ORDER — INSULIN LISPRO 100 [IU]/ML
0-4 INJECTION, SOLUTION INTRAVENOUS; SUBCUTANEOUS NIGHTLY
Status: DISCONTINUED | OUTPATIENT
Start: 2024-01-11 | End: 2024-01-11

## 2024-01-11 RX ORDER — INSULIN LISPRO 100 [IU]/ML
0-4 INJECTION, SOLUTION INTRAVENOUS; SUBCUTANEOUS NIGHTLY
Status: CANCELLED | OUTPATIENT
Start: 2024-01-11

## 2024-01-11 RX ORDER — SODIUM CHLORIDE 0.9 % (FLUSH) 0.9 %
5-40 SYRINGE (ML) INJECTION PRN
Status: DISCONTINUED | OUTPATIENT
Start: 2024-01-11 | End: 2024-01-11

## 2024-01-11 RX ORDER — SODIUM CHLORIDE 0.9 % (FLUSH) 0.9 %
5-40 SYRINGE (ML) INJECTION EVERY 12 HOURS SCHEDULED
Status: DISCONTINUED | OUTPATIENT
Start: 2024-01-11 | End: 2024-01-11

## 2024-01-11 RX ORDER — LISINOPRIL 10 MG/1
10 TABLET ORAL DAILY
Status: DISCONTINUED | OUTPATIENT
Start: 2024-01-12 | End: 2024-01-23 | Stop reason: HOSPADM

## 2024-01-11 RX ORDER — SODIUM CHLORIDE 0.9 % (FLUSH) 0.9 %
5-40 SYRINGE (ML) INJECTION EVERY 12 HOURS SCHEDULED
Status: CANCELLED | OUTPATIENT
Start: 2024-01-11

## 2024-01-11 RX ORDER — POLYETHYLENE GLYCOL 3350 17 G/17G
17 POWDER, FOR SOLUTION ORAL DAILY PRN
Status: CANCELLED | OUTPATIENT
Start: 2024-01-11

## 2024-01-11 RX ORDER — POLYETHYLENE GLYCOL 3350 17 G/17G
17 POWDER, FOR SOLUTION ORAL DAILY
Status: CANCELLED | OUTPATIENT
Start: 2024-01-12

## 2024-01-11 RX ORDER — HYDROCHLOROTHIAZIDE 25 MG/1
12.5 TABLET ORAL DAILY
Status: CANCELLED | OUTPATIENT
Start: 2024-01-12

## 2024-01-11 RX ORDER — ATORVASTATIN CALCIUM 10 MG/1
10 TABLET, FILM COATED ORAL DAILY
Status: CANCELLED | OUTPATIENT
Start: 2024-01-12

## 2024-01-11 RX ORDER — POTASSIUM CHLORIDE 7.45 MG/ML
10 INJECTION INTRAVENOUS PRN
Status: CANCELLED | OUTPATIENT
Start: 2024-01-11

## 2024-01-11 RX ORDER — IBUPROFEN 600 MG/1
1 TABLET ORAL PRN
Status: DISCONTINUED | OUTPATIENT
Start: 2024-01-11 | End: 2024-01-11

## 2024-01-11 RX ORDER — POTASSIUM CHLORIDE 7.45 MG/ML
10 INJECTION INTRAVENOUS PRN
Status: DISCONTINUED | OUTPATIENT
Start: 2024-01-11 | End: 2024-01-23 | Stop reason: HOSPADM

## 2024-01-11 RX ORDER — POTASSIUM CHLORIDE 29.8 MG/ML
20 INJECTION INTRAVENOUS PRN
Status: CANCELLED | OUTPATIENT
Start: 2024-01-11

## 2024-01-11 RX ORDER — ONDANSETRON 4 MG/1
4 TABLET, ORALLY DISINTEGRATING ORAL EVERY 8 HOURS PRN
Status: DISCONTINUED | OUTPATIENT
Start: 2024-01-11 | End: 2024-01-23 | Stop reason: HOSPADM

## 2024-01-11 RX ORDER — DEXTROSE MONOHYDRATE 100 MG/ML
INJECTION, SOLUTION INTRAVENOUS CONTINUOUS PRN
Status: DISCONTINUED | OUTPATIENT
Start: 2024-01-11 | End: 2024-01-11

## 2024-01-11 RX ORDER — SODIUM CHLORIDE 0.9 % (FLUSH) 0.9 %
5-40 SYRINGE (ML) INJECTION PRN
Status: CANCELLED | OUTPATIENT
Start: 2024-01-11

## 2024-01-11 RX ORDER — POLYETHYLENE GLYCOL 3350 17 G/17G
17 POWDER, FOR SOLUTION ORAL DAILY
Status: DISCONTINUED | OUTPATIENT
Start: 2024-01-12 | End: 2024-01-23 | Stop reason: HOSPADM

## 2024-01-11 RX ORDER — DEXTROSE MONOHYDRATE 100 MG/ML
INJECTION, SOLUTION INTRAVENOUS CONTINUOUS PRN
Status: CANCELLED | OUTPATIENT
Start: 2024-01-11

## 2024-01-11 RX ORDER — MAGNESIUM SULFATE IN WATER 40 MG/ML
2000 INJECTION, SOLUTION INTRAVENOUS PRN
Status: CANCELLED | OUTPATIENT
Start: 2024-01-11

## 2024-01-11 RX ORDER — MAGNESIUM SULFATE IN WATER 40 MG/ML
2000 INJECTION, SOLUTION INTRAVENOUS PRN
Status: DISCONTINUED | OUTPATIENT
Start: 2024-01-11 | End: 2024-01-11

## 2024-01-11 RX ORDER — MAGNESIUM SULFATE IN WATER 40 MG/ML
2000 INJECTION, SOLUTION INTRAVENOUS ONCE
Status: COMPLETED | OUTPATIENT
Start: 2024-01-11 | End: 2024-01-11

## 2024-01-11 RX ORDER — HYDROCHLOROTHIAZIDE 25 MG/1
12.5 TABLET ORAL DAILY
Status: DISCONTINUED | OUTPATIENT
Start: 2024-01-12 | End: 2024-01-23 | Stop reason: HOSPADM

## 2024-01-11 RX ADMIN — SODIUM CHLORIDE, PRESERVATIVE FREE 10 ML: 5 INJECTION INTRAVENOUS at 20:50

## 2024-01-11 RX ADMIN — ACETAMINOPHEN 650 MG: 325 TABLET ORAL at 20:53

## 2024-01-11 RX ADMIN — ATORVASTATIN CALCIUM 10 MG: 10 TABLET, FILM COATED ORAL at 10:07

## 2024-01-11 RX ADMIN — POLYETHYLENE GLYCOL 3350 17 G: 17 POWDER, FOR SOLUTION ORAL at 10:07

## 2024-01-11 RX ADMIN — POTASSIUM BICARBONATE 40 MEQ: 782 TABLET, EFFERVESCENT ORAL at 10:06

## 2024-01-11 RX ADMIN — SODIUM CHLORIDE, PRESERVATIVE FREE 10 ML: 5 INJECTION INTRAVENOUS at 10:07

## 2024-01-11 RX ADMIN — LEVETIRACETAM 500 MG: 500 TABLET, FILM COATED ORAL at 10:07

## 2024-01-11 RX ADMIN — LEVETIRACETAM 500 MG: 500 TABLET, FILM COATED ORAL at 20:49

## 2024-01-11 RX ADMIN — MAGNESIUM SULFATE HEPTAHYDRATE 2000 MG: 40 INJECTION, SOLUTION INTRAVENOUS at 10:47

## 2024-01-11 ASSESSMENT — PAIN SCALES - GENERAL
PAINLEVEL_OUTOF10: 0

## 2024-01-11 ASSESSMENT — LIFESTYLE VARIABLES
HOW MANY STANDARD DRINKS CONTAINING ALCOHOL DO YOU HAVE ON A TYPICAL DAY: PATIENT DOES NOT DRINK
HOW OFTEN DO YOU HAVE A DRINK CONTAINING ALCOHOL: NEVER

## 2024-01-11 NOTE — PROGRESS NOTES
Precert approved for IPR admission.  Faina Yanez RN and ALEX Lux updated.  Patient is a discharge/readmit to 8k6 call report to [facility] unit phone [phone].      Precert approval details:     Certification Number  [id number]    Approved 1/11/2024 NRD 1/18/2024 MATTEO Delarosa phone  ext [phone] fax clinicals to .

## 2024-01-11 NOTE — PROGRESS NOTES
09:40 PM EST Findings: Left cerebral convexity acute subdural hematoma, up to 0.6 cm, previously 1.1 cm. There is redistribution of subdural hemorrhage posteriorly. Left-to-right midline shift up to 0.1 cm previously 0.3 cm. No acute intraparenchymal hemorrhage. No intraventricular hemorrhage. Atrophy and chronic small-vessel ischemic changes. No acute depressed skull fracture thickness. Paranasal sinuses and mastoid air cells are well aerated. Left scalp soft tissue swelling and hematoma, similar to prior study.     Impression: Acute left cerebral convexity subdural hematoma, up to 0.6 cm, decreased in thickness when compared to prior study. Minimal residual left-to-right midline shift, improved since prior study. This document has been electronically signed by: Flaquito Maxwell MD on 01/08/2024 05:18 AM All CTs at this facility use dose modulation techniques and iterative reconstructions, and/or weight-based dosing when appropriate to reduce radiation to a low as reasonably achievable.    CT HEAD WO CONTRAST    Result Date: 1/7/2024  ** ADDENDUM #1 ** This report was discussed with Jayjay Taylor RN on Jan 07, 2024 22:39:00 EST. This document has been electronically signed by: Mehnaz Gavin on 01/07/2024 10:40 PM ** ORIGINAL REPORT ** CT Head without contrast Indication: Trauma follow-up. subdural hematoma. Technique: CT head without contrast. Coronal and sagittal reformations. Comparison: CT/KO/SR - CT HEAD WO CONTRAST - 01/07/2024 01:23 PM EST Findings: Left convexity acute subdural hematoma up to 1.1 cm, previously 0.7 cm, increased in size since the prior stud. New left to right midline shift up to 0.3 cm. Atrophy and chronic small-vessel ischemic changes. No acute intraparenchymal hematoma. No acute depressed skull fracture. Left frontotemporal scalp soft tissue swelling and hematoma. The mastoid air cells and visualized paranasal sinuses are well-aerated. Left gutierrez bullosa. prior bilateral cataract  care by the APN or PA.      Total time personally spent on this patient encounter was 35 minutes which includes :  Preparing to see the patient( reviewing tests and chart)  Obtaining and reviewing separately obtained history  Performing a medically appropriate examination and evaluation  Ordering medications, tests, or procedures  Counseling and educating the patient/family/caregiver  Care coordination  Referring and communicating with other healthcare professionals  Documenting clinical information in the EHR  Independent interpretation of results and communicating the results to patient and care team  This includes a direct physical exam as well as all the other encounter activities described above.   Time may be discontiguous.   Time does not include procedures.    Please see our orders that were directed and approved by me if there are any new ones for the updated patient care plan.    Above discussed and I agree with documentation and orders placed by Marilin LANGE    See any additional comments if needed below for any other updated orders and plans.

## 2024-01-11 NOTE — CARE COORDINATION
1/11/24, 11:47 AM EST    Patient goals/plan/ treatment preferences discussed by  and .  Patient goals/plan/ treatment preferences reviewed with patient/ family.  Patient/ family verbalize understanding of discharge plan and are in agreement with goal/plan/treatment preferences.  Understanding was demonstrated using the teach back method.  AVS provided by RN at time of discharge, which includes all necessary medical information pertaining to the patients current course of illness, treatment, post-discharge goals of care, and treatment preferences.     Services At/After Discharge: Inpatient rehab       IMM Letter  IMM Letter given to Patient/Family/Significant other/Guardian/POA/by:: ALEX  IMM Letter date given:: 01/09/24  IMM Letter time given:: 0957

## 2024-01-11 NOTE — PROGRESS NOTES
[facility]  Acute Inpatient Rehab Preadmission Assessment    Patient Name: Romario Marie        Ethnicity:Not of , /a, or English origin  Race:White  MRN: [id number]    : 1932  (89 y.o.)  Gender: male     Admitted from:[facility]  Initial Assessment    Date of admission to the hospital: 2024 12:50 PM  Date patient eligible for admission:2024    Primary Diagnosis: Subdural hematoma      Did patient have surgery?  no    Physicians: Noah Schwab MD, Dr. Alvarenga, Dr. Rhodes     Risk for clinical complications/co-morbidities:   Past Medical History:   Diagnosis Date    Diabetes mellitus (HCC)     Essential hypertension     H/O prostate cancer     Hyperlipidemia     Patient denies any diagnoses but is on medication    Paroxysmal atrial fibrillation (HCC)     Prior to surgery       Financial Information  Primary insurance:  [employer]    Secondary Insurance:   None    Has the patient had two or more falls in the past year or any fall with injury in the past year?   yes    Did the patient have major surgery during the 100 days prior to admission?  no    Precautions:     Restrictions/Precautions: General Precautions, Fall Risk  Other position/activity restrictions: hx of dementia, Keep systolic blood pressure less than 160 and above 100      Isolation Precautions: None       Physiatrist:  Dr. Alvarenga    Patients Occupation: Retired    Reviewed Lab and Diagnostic reports from Current Admission: Yes    Patients Prior Functional  Level: Prior Function  ADL Assistance: Independent  Homemaking Assistance: Needs assistance  Ambulation Assistance: Independent  Transfer Assistance: Independent  Additional Comments: IND with use of SC in and out of the home. information obtained from wife.    Current functional status for upper extremity ADL’s: Grooming: Contact Guard Assistance and with set-up.  To stand at sink and complete oral care  Bathing: Stand By  assistance    Expected length of time to achieve that level of improvement: 2 weeks    Current rehab issues: ADL dysfunction, bladder management, bowel management, carry over of therapy techniques, discharge planning, disease and co-morbidity management, gait/mobility dysfunction, medication management, nutrition and hydration management, Ongoing assessment of safety, Pain management, Patient and family education, Prevention of secondary complications, Skin Integrity, cognitive impairment, communication impairment.    Required therapy: Physical Therapy, Occupational Therapy and Speech Therapy 3 hours per day, 5-6 days per week.  Recreational Therapy 1 hour per week.    Expected Discharge Destination: Home    Expected Post Discharge Treatments: Out Patient    Other information relevant to the care needs:   Lives With: Spouse  Type of Home: House  Home Layout: Two level, Bed/Bath upstairs  Home Access: Stairs to enter without rails  Entrance Stairs - Number of Steps: 2 WILLIAMS, flight of steps to 2nd floor with B rail  Bathroom Shower/Tub: Tub/Shower unit, Walk-in shower  Bathroom Toilet: Handicap height  Bathroom Equipment: Built-in shower seat  Home Equipment: Cane, Walker, rolling  Has the patient had two or more falls in the past year or any fall with injury in the past year?: Yes  ADL Assistance: Independent  Homemaking Assistance: Needs assistance  Ambulation Assistance: Independent  Transfer Assistance: Independent  Active : No  Patient's  Info: Wife  IADL Comments: currently uses tub shower, plans to use walk in shower, pt complete most of cooking tasks, wife completes most cleaning  Additional Comments: IND with use of SC in and out of the home. information obtained from wife.    Acute Inpatient Rehabilitation Disclosure Statement provided to patient.  Patient verbalized understanding.     Patient requires an intensive and coordinate interdisciplinary team approach to the delivery of rehabilitation  care including PT/OT/ST and 24 hour nursing care and physician supervision to safely return to home setting with family.       I have reviewed and concur with the findings and results of the pre-admission screening assessment completed by the Inpatient Rehabilitation Admissions Coordinator.       Electronically signed by Wendy Alvarenga DO on 1/11/2024 at 9:58 AM

## 2024-01-11 NOTE — PROGRESS NOTES
Report called to Barb in IPR.  Belongings packed by wife.  Discharged in a wheelchair to  inpatient rehab per  hospital transporter.

## 2024-01-11 NOTE — PROGRESS NOTES
[facility] [place]  INPATIENT PHYSICAL THERAPY  DAILY NOTE  CHARU NEUROSCIENCES 4A - 4A-10/010-A    Time In: 0855  Time Out: 0938  Timed Code Treatment Minutes: 43 Minutes  Minutes: 43          Date: 2024  Patient Name: Romario Marie,  Gender:  male        MRN: [id number]  : 1932  (89 y.o.)     Referring Practitioner: India Arzate PA-C  Diagnosis: SDH  Additional Pertinent Hx: Per EMR \"Patient presented after fall at home.  According to the wife he was helping with some laundry he was carrying a basket down the steps and when he got to the bottom he thought he was on the bottom step when there was 1 more and he missed stepped fell hitting his head.  There was no loss of consciousness.  He is on Coumadin for atrial fibrillation.  The wife says he has dementia but he is at his baseline.\" brain CT that showed acute subdural hemorrhage in the left side with a 7 mm in thickness     Prior Level of Function:  Lives With: Spouse  Type of Home: House  Home Layout: Two level, Bed/Bath upstairs  Home Access: Stairs to enter without rails  Entrance Stairs - Number of Steps: 2 WILLIAMS, flight of steps to 2nd floor with B rail  Home Equipment: Cane, Walker, rolling   Bathroom Shower/Tub: Tub/Shower unit, Walk-in shower  Bathroom Toilet: Handicap height  Bathroom Equipment: Built-in shower seat    ADL Assistance: Independent  Homemaking Assistance: Needs assistance  Ambulation Assistance: Independent  Transfer Assistance: Independent  Active : No  IADL Comments: currently uses tub shower, plans to use walk in shower, pt complete most of cooking tasks, wife completes most cleaning  Additional Comments: IND with use of SC in and out of the home. information obtained from wife.    Restrictions/Precautions:  Restrictions/Precautions: General Precautions, Fall Risk  Position Activity Restriction  Other position/activity restrictions: hx of dementia, Keep systolic blood pressure less than 160 and above  goals.  Activity Tolerance:  Patient tolerance of  treatment: good. Pt demonstrates impairments with strength, balance, endurance, cognition/alertness, safety awareness, independence, requires hands on assistance for safety with mobility and would benefit from continued skilled PT improve these impairments, to maximize independence, to prevent falls and ensure safety with mobility, pt will greatly benefit from IPR 8E/8K.       Equipment Recommendations:Equipment Needed: No  Discharge Recommendations: Inpatient Rehabilitation  Plan: Current Treatment Recommendations: Strengthening, Balance training, Gait training, Stair training, Functional mobility training, Transfer training, Neuromuscular re-education, Equipment evaluation, education, & procurement, Endurance training, Patient/Caregiver education & training, Safety education & training, Therapeutic activities, Home exercise program  General Plan:  (5x N)    Education  Education:  Learners: Patient and Significant Other  Patient Education: Plan of Care, Bed Mobility, Transfers, Gait, Verbal Exercise Instruction, Education Related to Stroke Diagnosis    Goals:  Patient Goals : return home with wife  Short Term Goals  Time Frame for Short Term Goals: by discharge  Short Term Goal 1: Pt will amb for >200 feet with LRAD with S to progress towards PLOF.  Short Term Goal 2: Pt will demo sit to/from stand transfers with LRAD with S to progress towards PLOF.  Short Term Goal 3: Pt will demo IND with bed mobility tasks with bed flat to return  home safely.  Short Term Goal 4: Pt will demo S for stair negotiation for steps to enter home and flight of steps to 2nd floor with B rail for support to return home safely.  Short Term Goal 5: Pt will demo improved MAYES score by MDC of 6 to improve safety and decrease risk for falls.  Long Term Goals  Time Frame for Long Term Goals : NA due to short ELOS    Following session, patient left in safe position with all fall risk

## 2024-01-11 NOTE — DISCHARGE SUMMARY
Discharge Summary   Trauma Services    Patient Identification:  Romario Marie  : 1932  MRN: [id number]   Account: [id number]     Admit date: 2024  Discharge date: 24  Attending provider: Noah Schwab MD        Primary care provider: Steve Santoro MD     Discharge Diagnoses:   Principal Problem:    Subdural hemorrhage (HCC)  Active Problems:    Type 2 diabetes mellitus (HCC)    Accidental fall on or from stairs or steps    Essential hypertension, benign    Paroxysmal atrial fibrillation (HCC)    SDH (subdural hematoma) (HCC)    Supratherapeutic INR    Permanent atrial fibrillation (HCC)  Resolved Problems:    * No resolved hospital problems. *       Hospital Course:   Romario Marie is a 89 y.o. male admitted to [facility] on 2024 for traumatic SDH and hematoma following a fall  with no known loss of consciousness. Report stated pt was carrying a laundry basket down the stairs and missed the last step, causing him to lose balance. He is on anticoagulation - Coumadin which had been given 5 mg daily vs his 2.5 mg and 5 mg alternating schedule therefore INR was >10 on arrival and reversed.  Admitted under trauma services to ICU and stepdown. Inpatient management included as follows:  PLAN  Admitted to the ICU under Trauma services               - : Transferred out of ICU to      Trauma by fall              - Fall precautions              - PT, OT continue to treat     Subdural Hematoma              - Initial head CT notes small SDH of the left temporal/parietal region with maximum thickness of 7 mm.  There is mixed density, suggesting subacute process versus acute on chronic.  No midline shift.    - Consult neurosurgery               - Neuro checks per unit routine              - Maintain systolic blood pressure between 100-160              - Trauma dose TXA NOT given; K-Centra given and 1 unit of FFP transfused              - Elevate head of bed approximately 30

## 2024-01-11 NOTE — PLAN OF CARE
Problem: Discharge Planning  Goal: Discharge to home or other facility with appropriate resources  Outcome: Adequate for Discharge     Problem: Safety - Adult  Goal: Free from fall injury  Outcome: Adequate for Discharge     Problem: Neurosensory - Adult  Goal: Achieves stable or improved neurological status  Outcome: Adequate for Discharge     Problem: Cardiovascular - Adult  Goal: Maintains optimal cardiac output and hemodynamic stability  Outcome: Adequate for Discharge  Goal: Absence of cardiac dysrhythmias or at baseline  Outcome: Adequate for Discharge     Problem: Musculoskeletal - Adult  Goal: Return mobility to safest level of function  Outcome: Adequate for Discharge     Problem: Skin/Tissue Integrity  Goal: Absence of new skin breakdown  Description: 1.  Monitor for areas of redness and/or skin breakdown  2.  Assess vascular access sites hourly  3.  Every 4-6 hours minimum:  Change oxygen saturation probe site  4.  Every 4-6 hours:  If on nasal continuous positive airway pressure, respiratory therapy assess nares and determine need for appliance change or resting period.  Outcome: Adequate for Discharge     Problem: Chronic Conditions and Co-morbidities  Goal: Patient's chronic conditions and co-morbidity symptoms are monitored and maintained or improved  Outcome: Adequate for Discharge     Problem: Pain  Goal: Verbalizes/displays adequate comfort level or baseline comfort level  Outcome: Adequate for Dischar

## 2024-01-11 NOTE — PROGRESS NOTES
[facility]  INPATIENT REHABILITATION CENTER  PREADMISSION CONVERSATION WITH PATIENT/FAMILY    Date of Admission: 2024 12:50 PM    Patient Name: Romario Marie      MRN: [id number]    : 1932  (89 y.o.)  Gender: male     Payor Source: Payor: [employer] MEDICARE / Plan: [employer] CHOICE-PPO MEDICARE / Product Type: *No Product type* /   Secondary Payor Source:      Isolation Status: No active isolations    Spoke with patient and family explained acute rehabilitation philosophy, including fact that rehab consists of inpatient stay at [facility].  Rehabilitation unit care includes 24-hour nursing care, oversight by physician, 3 hours of therapy 6 days a week, individualized treatment plan.    Explained how benefits through insurance provider works.  Discussed patient readiness for admission to Inpatient Rehab based.  All medical testing must be completed and must be medically stable to be discharged from acute hospital setting to the acute inpatient rehabilitation unit.    Patient and family agree with planned admission to acute inpatient rehab upon insurance approval and medical stability for admission.    Patient and family able to explain reason for admission to acute inpatient rehab.     Patient is a discharge/readmit to [address] [place] [address] room 8k6 call [phone] for report, unit phone [phone].

## 2024-01-11 NOTE — H&P
Physical Medicine & Rehabilitation   History and Physical    Chief Complaint and Reason for Rehabilitation Admission: SDH    History of Present Illness:  Romario Marie  is a 89 y.o. male with PMH significant for T2DM, HTN, prostate cancer, and paroxysmal a-fib who is being admitted to the inpatient rehabilitation unit on 1/7/2024.  History obtained via: ED documentation, acute care documentation, and patient      Patient initially presented to [facility] ED on 1/7/2024 following a mechanical fall in which he tripped going down his basement steps striking his left forehead on the concrete floor. No LOC. Anticoagulated on Warfarin for history of a-fib. On arrival, wife reported history of dementia but had not noted any cognitive changes. CT head obtained and reportedly revealed an acute left sided SDH. INR supratherapeutic at 10 s/p Kcentra x1 and FFP. Patient was admitted for further evaluation and management.     Neurosurgery consulted and recommending a dose of TXA be given. Anticoagulation held and patient started on Keppra for seizure prophylaxis (for 1 week). Serial head CT were obtained reportedly revealing improvement. Most recent Head CT completed today, 1/11/2204. Neurosurgery recommending another following gup CT in 1 week and outpatient follow up with Neurosurgery.      Patient's acute care stay was complicated by HTN for which he initially required a gt drip and later transitioned to oral regimen. Patient additionally with some hypokalemia and hypomagnesemia requiring replacement     PM&R was consulted and patient determined to be a good candidate for acute IPR in order to maximize functional progress and independence prior to returning home. Precert approved through [employer].      On admission to inpatient rehabilitation unit, is seen with his wife, Tasneem at bedside. He has history of dementia so he is a poor historian but wife provides assistance. Prior to this admission patient with modified

## 2024-01-11 NOTE — PROGRESS NOTES
Patient/family has been educated on their personal risk factors of:    Hypertension  Hyperlipidemia  Atrial fibrillation  diabetes      They have been given hand outs on the following medications.       Statins (Lipitor)  Antihypertensives: Lisinopril, HCTZ    Treatment for stroke includes:  Risk factor modifications  Following the medication regime prescribed by physician      Educated on FAST-Face-Arm-Speech-Time    A stroke is a brain attack.Stroke is a brain injury. It occurs when the brain's blood supply is interrupted. Blood carries oxygen and nutrients to the brain. Without oxygen and nutrients from blood, brain tissue starts to die rapidly. This can happen in less than 10 minutes.    A stroke occurs when blood flow to the brain is blocked (called ischemic stroke). This is caused by one of the following:   Sudden decreased blood flow   Damage to a blood vessel supplying blood to the brain can occur suddenly from either:   Injury   A clot that forms and breaks off from another part of the body (such as the heart or neck)   There are certain conditions which predispose people to form blood clots, such as:   Cancer   Pregnancy   Atrial fibrillation   Certain autoimmune diseases   Local blood clot   A build-up of fatty substances ( atherosclerotic plaque ) along the inner lining of the artery causes:   Narrowing of artery   Reduced elasticity   Local inflammation   Blood protein defects leading to increased clotting tendency   Decreased blood flow in the artery   Clot in an artery supplying the brain   Inflammatory conditions in the blood vessels (vasculitis)   A stroke may also occur if a blood vessel breaks and bleeds into or around the brain. This is called hemorrhagic stroke.      This condition needs to be monitored closely. Be sure to keep all appointments. Have exams and blood tests done as directed.     Call 911 If Any of the Following Occurs   It is important that you and those around you know the

## 2024-01-11 NOTE — PLAN OF CARE
CT head wo contrast showing small thin left-sided subdural hematoma, improved compared to prior SDH. Patient to be discharged to [facility] today. Keppra to be continued, total duration 1 week, end date 1/14/24. Patient also referred to Dr. Joel Leon, [facility] for further evaluation for Watchman procedure due to history of paroxysmal atrial fibrillation and SDH 2/2 mechanical fall while on Coumadin therapy. Per telephone encounter note, will need to call Cardiology clinic to set up new patient appointment once patient is discharged from [facility].       Octavio Camilo MD  PGY-2 Internal Medicine Resident

## 2024-01-11 NOTE — TELEPHONE ENCOUNTER
Pt is needing a NP appt.  Pt is currently admitted.    Please call pt when pt gets discharged.    Diagnosis   I48.0 (ICD-10-CM) - Paroxysmal atrial fibrillation

## 2024-01-11 NOTE — PLAN OF CARE
Awaiting CT head today prior to admission to [facility]. Discharge/readmit completed.     Electronically signed by HECTOR Eastman CNP on 1/11/2024 at 10:43 AM

## 2024-01-12 LAB
ALBUMIN SERPL BCG-MCNC: 3.6 G/DL (ref 3.5–5.1)
ALP SERPL-CCNC: 62 U/L (ref 38–126)
ALT SERPL W/O P-5'-P-CCNC: 19 U/L (ref 11–66)
ANION GAP SERPL CALC-SCNC: 11 MEQ/L (ref 8–16)
AST SERPL-CCNC: 27 U/L (ref 5–40)
BASOPHILS ABSOLUTE: 0 THOU/MM3 (ref 0–0.1)
BASOPHILS NFR BLD AUTO: 0.5 %
BILIRUB CONJ SERPL-MCNC: 0.4 MG/DL (ref 0–0.3)
BILIRUB SERPL-MCNC: 1.8 MG/DL (ref 0.3–1.2)
BUN SERPL-MCNC: 14 MG/DL (ref 7–22)
CALCIUM SERPL-MCNC: 9.4 MG/DL (ref 8.5–10.5)
CHLORIDE SERPL-SCNC: 106 MEQ/L (ref 98–111)
CO2 SERPL-SCNC: 25 MEQ/L (ref 23–33)
CREAT SERPL-MCNC: 0.7 MG/DL (ref 0.4–1.2)
DEPRECATED RDW RBC AUTO: 44.8 FL (ref 35–45)
EOSINOPHIL NFR BLD AUTO: 4.7 %
EOSINOPHILS ABSOLUTE: 0.3 THOU/MM3 (ref 0–0.4)
ERYTHROCYTE [DISTWIDTH] IN BLOOD BY AUTOMATED COUNT: 13.8 % (ref 11.5–14.5)
GFR SERPL CREATININE-BSD FRML MDRD: > 60 ML/MIN/1.73M2
GLUCOSE SERPL-MCNC: 140 MG/DL (ref 70–108)
HCT VFR BLD AUTO: 43.3 % (ref 42–52)
HGB BLD-MCNC: 14.4 GM/DL (ref 14–18)
IMM GRANULOCYTES # BLD AUTO: 0.01 THOU/MM3 (ref 0–0.07)
IMM GRANULOCYTES NFR BLD AUTO: 0.2 %
INR PPP: 1.53 (ref 0.85–1.13)
LYMPHOCYTES ABSOLUTE: 1.9 THOU/MM3 (ref 1–4.8)
LYMPHOCYTES NFR BLD AUTO: 32.2 %
MCH RBC QN AUTO: 29.7 PG (ref 26–33)
MCHC RBC AUTO-ENTMCNC: 33.3 GM/DL (ref 32.2–35.5)
MCV RBC AUTO: 89.3 FL (ref 80–94)
MONOCYTES ABSOLUTE: 0.6 THOU/MM3 (ref 0.4–1.3)
MONOCYTES NFR BLD AUTO: 9.9 %
NEUTROPHILS NFR BLD AUTO: 52.5 %
NRBC BLD AUTO-RTO: 0 /100 WBC
PHOSPHATE SERPL-MCNC: 3.9 MG/DL (ref 2.4–4.7)
PLATELET # BLD AUTO: 174 THOU/MM3 (ref 130–400)
PMV BLD AUTO: 10.6 FL (ref 9.4–12.4)
POTASSIUM SERPL-SCNC: 4.4 MEQ/L (ref 3.5–5.2)
PREALB SERPL-MCNC: 15.7 MG/DL (ref 20–40)
PROT SERPL-MCNC: 6.5 G/DL (ref 6.1–8)
RBC # BLD AUTO: 4.85 MILL/MM3 (ref 4.7–6.1)
SEGMENTED NEUTROPHILS ABSOLUTE COUNT: 3.2 THOU/MM3 (ref 1.8–7.7)
SODIUM SERPL-SCNC: 142 MEQ/L (ref 135–145)
WBC # BLD AUTO: 6 THOU/MM3 (ref 4.8–10.8)

## 2024-01-12 PROCEDURE — 97110 THERAPEUTIC EXERCISES: CPT

## 2024-01-12 PROCEDURE — 97530 THERAPEUTIC ACTIVITIES: CPT

## 2024-01-12 PROCEDURE — 92610 EVALUATE SWALLOWING FUNCTION: CPT

## 2024-01-12 PROCEDURE — 80076 HEPATIC FUNCTION PANEL: CPT

## 2024-01-12 PROCEDURE — 6370000000 HC RX 637 (ALT 250 FOR IP): Performed by: NURSE PRACTITIONER

## 2024-01-12 PROCEDURE — 97162 PT EVAL MOD COMPLEX 30 MIN: CPT

## 2024-01-12 PROCEDURE — 97129 THER IVNTJ 1ST 15 MIN: CPT

## 2024-01-12 PROCEDURE — 92523 SPEECH SOUND LANG COMPREHEN: CPT

## 2024-01-12 PROCEDURE — 84100 ASSAY OF PHOSPHORUS: CPT

## 2024-01-12 PROCEDURE — 97166 OT EVAL MOD COMPLEX 45 MIN: CPT

## 2024-01-12 PROCEDURE — 99231 SBSQ HOSP IP/OBS SF/LOW 25: CPT | Performed by: NURSE PRACTITIONER

## 2024-01-12 PROCEDURE — 85025 COMPLETE CBC W/AUTO DIFF WBC: CPT

## 2024-01-12 PROCEDURE — 84134 ASSAY OF PREALBUMIN: CPT

## 2024-01-12 PROCEDURE — 36415 COLL VENOUS BLD VENIPUNCTURE: CPT

## 2024-01-12 PROCEDURE — 97116 GAIT TRAINING THERAPY: CPT

## 2024-01-12 PROCEDURE — 1180000000 HC REHAB R&B

## 2024-01-12 PROCEDURE — 80048 BASIC METABOLIC PNL TOTAL CA: CPT

## 2024-01-12 PROCEDURE — 97535 SELF CARE MNGMENT TRAINING: CPT

## 2024-01-12 PROCEDURE — 85610 PROTHROMBIN TIME: CPT

## 2024-01-12 RX ADMIN — LISINOPRIL 10 MG: 10 TABLET ORAL at 10:09

## 2024-01-12 RX ADMIN — ATORVASTATIN CALCIUM 10 MG: 10 TABLET, FILM COATED ORAL at 10:09

## 2024-01-12 RX ADMIN — LEVETIRACETAM 500 MG: 500 TABLET, FILM COATED ORAL at 10:11

## 2024-01-12 RX ADMIN — LEVETIRACETAM 500 MG: 500 TABLET, FILM COATED ORAL at 20:06

## 2024-01-12 RX ADMIN — HYDROCHLOROTHIAZIDE 12.5 MG: 25 TABLET ORAL at 10:10

## 2024-01-12 ASSESSMENT — PAIN SCALES - GENERAL: PAINLEVEL_OUTOF10: 0

## 2024-01-12 NOTE — ACP (ADVANCE CARE PLANNING)
Advance Care Planning     Advance Care Planning Inpatient Note  Spiritual Care Department    Today's Date: 1/12/2024  Unit: [facility]    Received request from IDT Member.  Upon review of chart and communication with care team, Spiritual Care will defer advance care planning with patient at this time.. Patient and Spouse was/were present in the room during visit.    Goals of ACP Conversation:  Discuss advance care planning documents  Facilitate a discussion related to patient's goals of care as they align with the patient's values and beliefs.    Health Care Decision Makers:       Primary Decision Maker: Tasneem Marie - Spouse - [phone]    Secondary Decision Maker: Sabi Songina - Child - [phone]  Summary:  Documented Next of Kin, per patient report    Advance Care Planning Documents (Patient Wishes):  Healthcare Power of /Advance Directive Appointment of Health Care Agent  Living Will/Advance Directive  Legal Guardianship Document     Assessment:  The patient was in his room 8k06. The patient has a head injury and the  attempted to test the patient's orientation. The patient knew who he was but did not know where he was or what type of facility he was in. The proper answer was [facility]. The patient was also asked this question after having told the answerers. This lack of orientation leaves the  unable to complete the document at this time. The patient's spouse appreciated the visit.     Interventions:  Provided education on documents for clarity and greater understanding  Discussed and provided education on state decision maker hierarchy  Encouraged ongoing ACP conversation with future decision makers and loved ones  Reviewed but did not complete ACP document    Care Preferences Communicated:   No    Outcomes/Plan:  ACP Discussion: Postponed  Teach Back Method used to verify the patient's and/or Healthcare Decision Maker's understanding of

## 2024-01-12 NOTE — CONSULTS
Department of Family Practice  Consult Note    This patient was seen earlier in the day.      Reason for Consult:  Medical management while on the Inpatient Rehab unit.    Requesting Physician:  Dr Alvarenga    CHIEF COMPLAINT:   The need to continue the intensive time with therapies following the acute hospital stay.    History Obtained From:  patient, EMR    HISTORY OF PRESENT ILLNESS:              The patient is a 89 y.o. male with significant past medical history of       Diagnosis Date    Diabetes mellitus (HCC)     Essential hypertension     H/O prostate cancer     Hyperlipidemia     Patient denies any diagnoses but is on medication    Paroxysmal atrial fibrillation (HCC)     Prior to surgery      who presents with a fall while taking coumadin. He came to the ED for evaluation. Here he was found to have a SDH. The INR was found to be markedly elevated too. He did not require any surgical intervention. After becoming medically stable he has come to the Inpatient Rehab Unit to continue the time with therapies prior to a discharge disposition being made.      Past Medical History:        Diagnosis Date    Diabetes mellitus (HCC)     Essential hypertension     H/O prostate cancer     Hyperlipidemia     Patient denies any diagnoses but is on medication    Paroxysmal atrial fibrillation (HCC)     Prior to surgery     Past Surgical History:        Procedure Laterality Date    CHOLECYSTECTOMY      FEMUR FRACTURE SURGERY Left 12/15/2018    LEFT FEMUR INTERTAN NAIL JACKY INSERTION performed by Earle Burkett MD at [facility] OR    HIP FRACTURE SURGERY  12/15/2018    Left intertrochanteric nailing and jacky     Current Medications:   Current Facility-Administered Medications: sodium phosphate 15 mmol in sodium chloride 0.9 % 250 mL IVPB, 15 mmol, IntraVENous, PRN  sodium chloride flush 0.9 % injection 5-40 mL, 5-40 mL, IntraVENous, PRN  sodium chloride flush 0.9 % injection 5-40 mL, 5-40 mL, IntraVENous, 2 times per

## 2024-01-12 NOTE — CARE COORDINATION
Patient unable tot state name or . He has difficulty remembering how to perform ADLs such as brushing teeth.1 assist with a cane and gait belt

## 2024-01-13 LAB
EKG Q-T INTERVAL: 448 MS
EKG QRS DURATION: 98 MS
EKG QTC CALCULATION (BAZETT): 465 MS
EKG R AXIS: -46 DEGREES
EKG T AXIS: -2 DEGREES
EKG VENTRICULAR RATE: 65 BPM
GLUCOSE BLD STRIP.AUTO-MCNC: 139 MG/DL (ref 70–108)

## 2024-01-13 PROCEDURE — 93005 ELECTROCARDIOGRAM TRACING: CPT | Performed by: NURSE PRACTITIONER

## 2024-01-13 PROCEDURE — 97116 GAIT TRAINING THERAPY: CPT

## 2024-01-13 PROCEDURE — 97530 THERAPEUTIC ACTIVITIES: CPT

## 2024-01-13 PROCEDURE — 93010 ELECTROCARDIOGRAM REPORT: CPT | Performed by: INTERNAL MEDICINE

## 2024-01-13 PROCEDURE — 97130 THER IVNTJ EA ADDL 15 MIN: CPT

## 2024-01-13 PROCEDURE — 99223 1ST HOSP IP/OBS HIGH 75: CPT | Performed by: INTERNAL MEDICINE

## 2024-01-13 PROCEDURE — 1180000000 HC REHAB R&B

## 2024-01-13 PROCEDURE — 97110 THERAPEUTIC EXERCISES: CPT

## 2024-01-13 PROCEDURE — 6370000000 HC RX 637 (ALT 250 FOR IP): Performed by: NURSE PRACTITIONER

## 2024-01-13 PROCEDURE — 82948 REAGENT STRIP/BLOOD GLUCOSE: CPT

## 2024-01-13 PROCEDURE — 97129 THER IVNTJ 1ST 15 MIN: CPT

## 2024-01-13 RX ADMIN — ATORVASTATIN CALCIUM 10 MG: 10 TABLET, FILM COATED ORAL at 09:35

## 2024-01-13 RX ADMIN — POLYETHYLENE GLYCOL 3350 17 G: 17 POWDER, FOR SOLUTION ORAL at 09:35

## 2024-01-13 RX ADMIN — LEVETIRACETAM 500 MG: 500 TABLET, FILM COATED ORAL at 09:35

## 2024-01-13 RX ADMIN — LISINOPRIL 10 MG: 10 TABLET ORAL at 09:35

## 2024-01-13 RX ADMIN — HYDROCHLOROTHIAZIDE 12.5 MG: 25 TABLET ORAL at 09:35

## 2024-01-13 RX ADMIN — LEVETIRACETAM 500 MG: 500 TABLET, FILM COATED ORAL at 20:01

## 2024-01-13 ASSESSMENT — PAIN SCALES - GENERAL
PAINLEVEL_OUTOF10: 0

## 2024-01-13 NOTE — CONSULTS
[facility]  Cardiology Consult      Patient:  Romario Marie  YOB: 1932    MRN: [id number]   Acct: [id number]     Primary Care Physician: Steve Santoro MD    REASON FOR CONSULT:    Atrial fibrillation  Abnormal heart rate     CHIEF COMPLAINT:    S/p fall    HISTORY OF PRESENT ILLNESS:    Romario Marie is a pleasant 89 year old male patient with past medical history that includes:   Past Medical History:   Diagnosis Date    Diabetes mellitus (HCC)     Essential hypertension     H/O prostate cancer     Hyperlipidemia     Patient denies any diagnoses but is on medication    Paroxysmal atrial fibrillation (HCC)     Prior to surgery   The patient was transferred to physical medicine and rehabilitation on 1/11/2023. He was previously admitted to the hospital for subdural hemorrhage post mechanical fall. He has h/o atrial fibrillation and used to be on OAC/Coumadin. He was evaluated by Neurosurgery was given TXA. Today, patient was noted in rehabilitation to have a variable heart rate. Cardiology was consulted. Patient denies chest pain, shortness of breath, dyspnea on exertion, orthopnea, paroxysmal nocturnal dyspnea, palpitations, dizziness, syncope, recent weight gain or leg swelling. EKG revealed atrial fibrillation, PVCs, LAFB. Echocardiogram on 1/9/2024 revealed an ejection fraction of 55-60%, no regional wall motion abnormality was detected.     All labs, EKG's, diagnostic testing and images as well as cardiac cath, stress testing   were reviewed during this encounter    CARDIAC TESTING    Echo:     Left Ventricle: Normal left ventricular systolic function with a visually estimated EF of 55 - 60%. Left ventricle size is normal. Normal wall thickness. Normal wall motion. Normal diastolic function.    Aortic Valve: Not well visualized. Mildly calcified cusp.    Image quality is technically difficult. Technically difficult study.       Past Medical History:    Past

## 2024-01-14 LAB — MAGNESIUM SERPL-MCNC: 1.6 MG/DL (ref 1.6–2.4)

## 2024-01-14 PROCEDURE — 6370000000 HC RX 637 (ALT 250 FOR IP): Performed by: NURSE PRACTITIONER

## 2024-01-14 PROCEDURE — 1180000000 HC REHAB R&B

## 2024-01-14 PROCEDURE — 83735 ASSAY OF MAGNESIUM: CPT

## 2024-01-14 PROCEDURE — 6370000000 HC RX 637 (ALT 250 FOR IP): Performed by: FAMILY MEDICINE

## 2024-01-14 PROCEDURE — 36415 COLL VENOUS BLD VENIPUNCTURE: CPT

## 2024-01-14 RX ORDER — LANOLIN ALCOHOL/MO/W.PET/CERES
400 CREAM (GRAM) TOPICAL 2 TIMES DAILY
Status: DISCONTINUED | OUTPATIENT
Start: 2024-01-14 | End: 2024-01-23 | Stop reason: HOSPADM

## 2024-01-14 RX ADMIN — LEVETIRACETAM 500 MG: 500 TABLET, FILM COATED ORAL at 09:27

## 2024-01-14 RX ADMIN — LISINOPRIL 10 MG: 10 TABLET ORAL at 09:27

## 2024-01-14 RX ADMIN — POLYETHYLENE GLYCOL 3350 17 G: 17 POWDER, FOR SOLUTION ORAL at 09:27

## 2024-01-14 RX ADMIN — Medication 400 MG: at 19:52

## 2024-01-14 RX ADMIN — ATORVASTATIN CALCIUM 10 MG: 10 TABLET, FILM COATED ORAL at 09:27

## 2024-01-14 RX ADMIN — HYDROCHLOROTHIAZIDE 12.5 MG: 25 TABLET ORAL at 09:26

## 2024-01-14 ASSESSMENT — PAIN SCALES - GENERAL
PAINLEVEL_OUTOF10: 0
PAINLEVEL_OUTOF10: 0

## 2024-01-15 LAB
ALBUMIN SERPL BCG-MCNC: 3.8 G/DL (ref 3.5–5.1)
ALP SERPL-CCNC: 60 U/L (ref 38–126)
ALT SERPL W/O P-5'-P-CCNC: 24 U/L (ref 11–66)
ANION GAP SERPL CALC-SCNC: 12 MEQ/L (ref 8–16)
AST SERPL-CCNC: 27 U/L (ref 5–40)
BILIRUB SERPL-MCNC: 1.4 MG/DL (ref 0.3–1.2)
BUN SERPL-MCNC: 16 MG/DL (ref 7–22)
CALCIUM SERPL-MCNC: 9.5 MG/DL (ref 8.5–10.5)
CHLORIDE SERPL-SCNC: 102 MEQ/L (ref 98–111)
CO2 SERPL-SCNC: 22 MEQ/L (ref 23–33)
CREAT SERPL-MCNC: 0.7 MG/DL (ref 0.4–1.2)
GFR SERPL CREATININE-BSD FRML MDRD: > 60 ML/MIN/1.73M2
GLUCOSE SERPL-MCNC: 143 MG/DL (ref 70–108)
POTASSIUM SERPL-SCNC: 3.9 MEQ/L (ref 3.5–5.2)
PROT SERPL-MCNC: 6.8 G/DL (ref 6.1–8)
SODIUM SERPL-SCNC: 136 MEQ/L (ref 135–145)

## 2024-01-15 PROCEDURE — 80053 COMPREHEN METABOLIC PANEL: CPT

## 2024-01-15 PROCEDURE — 6370000000 HC RX 637 (ALT 250 FOR IP): Performed by: FAMILY MEDICINE

## 2024-01-15 PROCEDURE — 99232 SBSQ HOSP IP/OBS MODERATE 35: CPT | Performed by: STUDENT IN AN ORGANIZED HEALTH CARE EDUCATION/TRAINING PROGRAM

## 2024-01-15 PROCEDURE — 97110 THERAPEUTIC EXERCISES: CPT

## 2024-01-15 PROCEDURE — 97116 GAIT TRAINING THERAPY: CPT

## 2024-01-15 PROCEDURE — 1180000000 HC REHAB R&B

## 2024-01-15 PROCEDURE — 97530 THERAPEUTIC ACTIVITIES: CPT

## 2024-01-15 PROCEDURE — 97535 SELF CARE MNGMENT TRAINING: CPT

## 2024-01-15 PROCEDURE — 97112 NEUROMUSCULAR REEDUCATION: CPT

## 2024-01-15 PROCEDURE — 97130 THER IVNTJ EA ADDL 15 MIN: CPT

## 2024-01-15 PROCEDURE — 36415 COLL VENOUS BLD VENIPUNCTURE: CPT

## 2024-01-15 PROCEDURE — 97129 THER IVNTJ 1ST 15 MIN: CPT

## 2024-01-15 PROCEDURE — 6370000000 HC RX 637 (ALT 250 FOR IP): Performed by: NURSE PRACTITIONER

## 2024-01-15 RX ADMIN — LISINOPRIL 10 MG: 10 TABLET ORAL at 08:15

## 2024-01-15 RX ADMIN — Medication 400 MG: at 21:19

## 2024-01-15 RX ADMIN — ATORVASTATIN CALCIUM 10 MG: 10 TABLET, FILM COATED ORAL at 08:15

## 2024-01-15 RX ADMIN — Medication 400 MG: at 08:15

## 2024-01-15 RX ADMIN — HYDROCHLOROTHIAZIDE 12.5 MG: 25 TABLET ORAL at 08:15

## 2024-01-16 PROCEDURE — 97130 THER IVNTJ EA ADDL 15 MIN: CPT | Performed by: SPEECH-LANGUAGE PATHOLOGIST

## 2024-01-16 PROCEDURE — 97112 NEUROMUSCULAR REEDUCATION: CPT

## 2024-01-16 PROCEDURE — 97110 THERAPEUTIC EXERCISES: CPT

## 2024-01-16 PROCEDURE — 99232 SBSQ HOSP IP/OBS MODERATE 35: CPT | Performed by: STUDENT IN AN ORGANIZED HEALTH CARE EDUCATION/TRAINING PROGRAM

## 2024-01-16 PROCEDURE — 97116 GAIT TRAINING THERAPY: CPT

## 2024-01-16 PROCEDURE — 97530 THERAPEUTIC ACTIVITIES: CPT

## 2024-01-16 PROCEDURE — 6370000000 HC RX 637 (ALT 250 FOR IP): Performed by: NURSE PRACTITIONER

## 2024-01-16 PROCEDURE — 97129 THER IVNTJ 1ST 15 MIN: CPT | Performed by: SPEECH-LANGUAGE PATHOLOGIST

## 2024-01-16 PROCEDURE — 6370000000 HC RX 637 (ALT 250 FOR IP): Performed by: FAMILY MEDICINE

## 2024-01-16 PROCEDURE — 1180000000 HC REHAB R&B

## 2024-01-16 RX ADMIN — Medication 400 MG: at 19:54

## 2024-01-16 RX ADMIN — Medication 400 MG: at 09:25

## 2024-01-16 RX ADMIN — HYDROCHLOROTHIAZIDE 12.5 MG: 25 TABLET ORAL at 09:24

## 2024-01-16 RX ADMIN — LISINOPRIL 10 MG: 10 TABLET ORAL at 09:24

## 2024-01-16 RX ADMIN — ATORVASTATIN CALCIUM 10 MG: 10 TABLET, FILM COATED ORAL at 09:24

## 2024-01-16 ASSESSMENT — PAIN SCALES - GENERAL: PAINLEVEL_OUTOF10: 0

## 2024-01-16 NOTE — TELEPHONE ENCOUNTER
Pt was admitted for a subdural hemorrhage as he had a fall after starting Eliquis.      Will check next week to see if patient has been discharged and if they still need a NP referral.  Reminder sent

## 2024-01-17 PROCEDURE — 6370000000 HC RX 637 (ALT 250 FOR IP): Performed by: NURSE PRACTITIONER

## 2024-01-17 PROCEDURE — 99232 SBSQ HOSP IP/OBS MODERATE 35: CPT | Performed by: STUDENT IN AN ORGANIZED HEALTH CARE EDUCATION/TRAINING PROGRAM

## 2024-01-17 PROCEDURE — 97535 SELF CARE MNGMENT TRAINING: CPT

## 2024-01-17 PROCEDURE — 97116 GAIT TRAINING THERAPY: CPT

## 2024-01-17 PROCEDURE — 6370000000 HC RX 637 (ALT 250 FOR IP): Performed by: FAMILY MEDICINE

## 2024-01-17 PROCEDURE — 97530 THERAPEUTIC ACTIVITIES: CPT

## 2024-01-17 PROCEDURE — 97130 THER IVNTJ EA ADDL 15 MIN: CPT | Performed by: SPEECH-LANGUAGE PATHOLOGIST

## 2024-01-17 PROCEDURE — 1180000000 HC REHAB R&B

## 2024-01-17 PROCEDURE — 97129 THER IVNTJ 1ST 15 MIN: CPT | Performed by: SPEECH-LANGUAGE PATHOLOGIST

## 2024-01-17 PROCEDURE — 97110 THERAPEUTIC EXERCISES: CPT

## 2024-01-17 PROCEDURE — 97112 NEUROMUSCULAR REEDUCATION: CPT

## 2024-01-17 RX ADMIN — HYDROCHLOROTHIAZIDE 12.5 MG: 25 TABLET ORAL at 09:14

## 2024-01-17 RX ADMIN — Medication 400 MG: at 19:26

## 2024-01-17 RX ADMIN — POLYETHYLENE GLYCOL 3350 17 G: 17 POWDER, FOR SOLUTION ORAL at 09:14

## 2024-01-17 RX ADMIN — LISINOPRIL 10 MG: 10 TABLET ORAL at 09:14

## 2024-01-17 RX ADMIN — ATORVASTATIN CALCIUM 10 MG: 10 TABLET, FILM COATED ORAL at 09:14

## 2024-01-17 RX ADMIN — Medication 400 MG: at 09:14

## 2024-01-17 ASSESSMENT — PAIN SCALES - GENERAL
PAINLEVEL_OUTOF10: 0
PAINLEVEL_OUTOF10: 0

## 2024-01-17 NOTE — CARE COORDINATION
Patient educated on how to use incentive spirometer. Patient verbalized understanding and demonstrated proper use. Emphasized importance and usage of device, with coughing and deep breathing every 4 hours while awake.  Patient has slept well

## 2024-01-17 NOTE — PROGRESS NOTES
Resident Consult Progress Note (Hospitalist)    Name: Romario Marie, male, : 1932, MRN: [id number]      Date of Admission: 2024  Date of Service: Pt seen/examined on 24      ASSESSMENT/PLAN:    Subdural hematoma, 2/2 mechanical fall isoper therapeutic INR: Presented trauma alert.  CT head with moderate focal hematoma left frontal/parietal region and small subdural hematoma left temporal/parietal region with thickness 7 mm, mixed density and no midline shift.  Initial INR 10.03, s/p Kcentra x 1 and FFP.  Neurosurgery and trauma following.  Neurochecks every 4 hours, NIHSS every shift.  Started on Keppra 500 mg p.o. twice daily.  Per patient's wife, patient  was previously taking warfarin incorrectly, 5 mg daily versus 2.5 mg on  and 5 mg . Repeat CT head demonstrated acute left cerebral convexity subdural hematoma, up to 0.6 cm, decrease in thickness when compared to prior study.  Minimal residual left-to-right midline shift, improved since prior study.  GCS 15  Holding warfarin and aspirin at this time 2/2 subdural hematoma, SCDs for DVT prophylaxis  Continue Keppra as noted above  Fall and seizure precautions in place.    Maintain systolic blood pressure less than 160.    Patient scheduled for repeat CT scan on  to assess for hematoma  Paroxysmal atrial fibrillation: [id number] 4.  On Coumadin for anticoagulation, no rate or rhythm control agents. Initial INR supratherapeutic in setting of bleed, as noted above.    Patient noted to be out of Afib , appears more regular with indiscernible P waves. NSR vs sinus arrythmia.   Patient to follow up Dr. Leon for evaluation of Watchman procedure  INR 1.80 1/10  HTN: Per neurology recommendations will maintain systolic blood pressure less than 160  Patient lisinopril/HCTZ resumed   Chronic:  HLD: Started Lipitor 10 mg p.o. daily  NIDDMII: No HbA1c on record. Home antidiabetic regimen  includes saxagliptin and metformin.  Continue sliding scale insulin with POCT glucose checks, hypoglycemia protocol in place.  History of prostate cancer  History of dementia: Alert to person and time, not place.  Baseline for patient per wife      Thank you, No att. providers found, for the consultation.  Hospitalist service will sign off. Patient stable to be discharged to [facility].     Electronically signed by Tim Hughes MD on 1/17/2024 at 1:53 PM      ===================================================================      Chief Complaint:  Mechanical fall     Hospital Course:   Patient is a 89-year-old male, medical history of paroxysmal A-fib on Coumadin, hypertension, hyperlipidemia, non-insulin-dependent type 2 diabetes, prostate cancer admitted to [facility] on 1/7/2024 after mechanical fall at home.  Patient states he was going downstairs to do laundry when he slipped and fell.  Patient did not lose consciousness, was able to stand up on his own.  Patient's wife became concerned when he developed a hematoma on his forehead several minutes later.  In ED, patient noted for supratherapeutic INR, patient's wife later realized that she has been overdosing patient on Coumadin, patient was noted to be on 2.5 mg on Monday, Wednesday, and Friday, and 5 mg every other day, but patient still was on 5 mg daily incorrectly.  Patient was given center, patient's INR within normal limits for A-fib at this time.     1/10: Patient notes no complaints at this time, on examination this morning patient did have worsening of left forehead hematoma that was not present on examination yesterday.  Patient denies any pain at this time    Subjective/24 hours: Patient approved for IPR, patient not in Afib on telemetry. Will be transferred to [facility] today.      REVIEW OF SYSTEMS:   Pertinent positives as noted in the subjective portion. Otherwise complete ROS reviewed and negative/unchanged.    Active Medications:        PHYSICAL EXAM:  BP

## 2024-01-18 ENCOUNTER — APPOINTMENT (OUTPATIENT)
Dept: CT IMAGING | Age: 89
DRG: 950 | End: 2024-01-18
Attending: STUDENT IN AN ORGANIZED HEALTH CARE EDUCATION/TRAINING PROGRAM
Payer: MEDICARE

## 2024-01-18 PROCEDURE — 70450 CT HEAD/BRAIN W/O DYE: CPT

## 2024-01-18 PROCEDURE — 97535 SELF CARE MNGMENT TRAINING: CPT

## 2024-01-18 PROCEDURE — 99232 SBSQ HOSP IP/OBS MODERATE 35: CPT | Performed by: STUDENT IN AN ORGANIZED HEALTH CARE EDUCATION/TRAINING PROGRAM

## 2024-01-18 PROCEDURE — 97129 THER IVNTJ 1ST 15 MIN: CPT

## 2024-01-18 PROCEDURE — 97110 THERAPEUTIC EXERCISES: CPT

## 2024-01-18 PROCEDURE — 97530 THERAPEUTIC ACTIVITIES: CPT

## 2024-01-18 PROCEDURE — 97130 THER IVNTJ EA ADDL 15 MIN: CPT

## 2024-01-18 PROCEDURE — 6370000000 HC RX 637 (ALT 250 FOR IP): Performed by: NURSE PRACTITIONER

## 2024-01-18 PROCEDURE — 6370000000 HC RX 637 (ALT 250 FOR IP): Performed by: FAMILY MEDICINE

## 2024-01-18 PROCEDURE — 1180000000 HC REHAB R&B

## 2024-01-18 PROCEDURE — 97116 GAIT TRAINING THERAPY: CPT

## 2024-01-18 RX ADMIN — LISINOPRIL 10 MG: 10 TABLET ORAL at 08:22

## 2024-01-18 RX ADMIN — HYDROCHLOROTHIAZIDE 12.5 MG: 25 TABLET ORAL at 08:22

## 2024-01-18 RX ADMIN — POLYETHYLENE GLYCOL 3350 17 G: 17 POWDER, FOR SOLUTION ORAL at 08:22

## 2024-01-18 RX ADMIN — Medication 400 MG: at 08:22

## 2024-01-18 RX ADMIN — ATORVASTATIN CALCIUM 10 MG: 10 TABLET, FILM COATED ORAL at 08:22

## 2024-01-18 RX ADMIN — Medication 400 MG: at 20:05

## 2024-01-18 NOTE — CARE COORDINATION
Patient educated on how to use incentive spirometer. Patient verbalized understanding and demonstrated proper use. Emphasized importance and usage of device, with coughing and deep breathing every 4 hours while awake.  Patient has slept well and has denied any pain.

## 2024-01-19 LAB — MAGNESIUM SERPL-MCNC: 1.9 MG/DL (ref 1.6–2.4)

## 2024-01-19 PROCEDURE — 92507 TX SP LANG VOICE COMM INDIV: CPT

## 2024-01-19 PROCEDURE — 97535 SELF CARE MNGMENT TRAINING: CPT

## 2024-01-19 PROCEDURE — 6370000000 HC RX 637 (ALT 250 FOR IP): Performed by: FAMILY MEDICINE

## 2024-01-19 PROCEDURE — 97110 THERAPEUTIC EXERCISES: CPT

## 2024-01-19 PROCEDURE — 99232 SBSQ HOSP IP/OBS MODERATE 35: CPT | Performed by: STUDENT IN AN ORGANIZED HEALTH CARE EDUCATION/TRAINING PROGRAM

## 2024-01-19 PROCEDURE — 97530 THERAPEUTIC ACTIVITIES: CPT

## 2024-01-19 PROCEDURE — 83735 ASSAY OF MAGNESIUM: CPT

## 2024-01-19 PROCEDURE — 97129 THER IVNTJ 1ST 15 MIN: CPT

## 2024-01-19 PROCEDURE — 6370000000 HC RX 637 (ALT 250 FOR IP): Performed by: NURSE PRACTITIONER

## 2024-01-19 PROCEDURE — 36415 COLL VENOUS BLD VENIPUNCTURE: CPT

## 2024-01-19 PROCEDURE — 1180000000 HC REHAB R&B

## 2024-01-19 PROCEDURE — 97116 GAIT TRAINING THERAPY: CPT

## 2024-01-19 RX ADMIN — HYDROCHLOROTHIAZIDE 12.5 MG: 25 TABLET ORAL at 09:37

## 2024-01-19 RX ADMIN — ATORVASTATIN CALCIUM 10 MG: 10 TABLET, FILM COATED ORAL at 09:38

## 2024-01-19 RX ADMIN — POLYETHYLENE GLYCOL 3350 17 G: 17 POWDER, FOR SOLUTION ORAL at 09:37

## 2024-01-19 RX ADMIN — Medication 400 MG: at 09:37

## 2024-01-19 RX ADMIN — LISINOPRIL 10 MG: 10 TABLET ORAL at 09:38

## 2024-01-19 RX ADMIN — Medication 400 MG: at 19:38

## 2024-01-19 ASSESSMENT — PAIN SCALES - GENERAL: PAINLEVEL_OUTOF10: 0

## 2024-01-20 PROCEDURE — 1180000000 HC REHAB R&B

## 2024-01-20 PROCEDURE — 6370000000 HC RX 637 (ALT 250 FOR IP): Performed by: FAMILY MEDICINE

## 2024-01-20 PROCEDURE — 6370000000 HC RX 637 (ALT 250 FOR IP): Performed by: NURSE PRACTITIONER

## 2024-01-20 RX ADMIN — Medication 400 MG: at 08:45

## 2024-01-20 RX ADMIN — POLYETHYLENE GLYCOL 3350 17 G: 17 POWDER, FOR SOLUTION ORAL at 08:46

## 2024-01-20 RX ADMIN — LISINOPRIL 10 MG: 10 TABLET ORAL at 08:45

## 2024-01-20 RX ADMIN — ATORVASTATIN CALCIUM 10 MG: 10 TABLET, FILM COATED ORAL at 08:45

## 2024-01-20 RX ADMIN — Medication 400 MG: at 21:11

## 2024-01-20 RX ADMIN — HYDROCHLOROTHIAZIDE 12.5 MG: 25 TABLET ORAL at 08:45

## 2024-01-21 PROCEDURE — 6370000000 HC RX 637 (ALT 250 FOR IP): Performed by: NURSE PRACTITIONER

## 2024-01-21 PROCEDURE — 6370000000 HC RX 637 (ALT 250 FOR IP): Performed by: FAMILY MEDICINE

## 2024-01-21 PROCEDURE — 97116 GAIT TRAINING THERAPY: CPT

## 2024-01-21 PROCEDURE — 1180000000 HC REHAB R&B

## 2024-01-21 PROCEDURE — 97530 THERAPEUTIC ACTIVITIES: CPT

## 2024-01-21 PROCEDURE — 97110 THERAPEUTIC EXERCISES: CPT

## 2024-01-21 RX ADMIN — HYDROCHLOROTHIAZIDE 12.5 MG: 25 TABLET ORAL at 09:29

## 2024-01-21 RX ADMIN — ATORVASTATIN CALCIUM 10 MG: 10 TABLET, FILM COATED ORAL at 09:29

## 2024-01-21 RX ADMIN — POLYETHYLENE GLYCOL 3350 17 G: 17 POWDER, FOR SOLUTION ORAL at 09:29

## 2024-01-21 RX ADMIN — Medication 400 MG: at 19:32

## 2024-01-21 RX ADMIN — Medication 400 MG: at 09:29

## 2024-01-21 RX ADMIN — LISINOPRIL 10 MG: 10 TABLET ORAL at 09:29

## 2024-01-22 ENCOUNTER — APPOINTMENT (OUTPATIENT)
Dept: CT IMAGING | Age: 89
DRG: 950 | End: 2024-01-22
Attending: STUDENT IN AN ORGANIZED HEALTH CARE EDUCATION/TRAINING PROGRAM
Payer: MEDICARE

## 2024-01-22 LAB
ANION GAP SERPL CALC-SCNC: 12 MEQ/L (ref 8–16)
BASOPHILS ABSOLUTE: 0 THOU/MM3 (ref 0–0.1)
BASOPHILS NFR BLD AUTO: 0.6 %
BUN SERPL-MCNC: 19 MG/DL (ref 7–22)
CALCIUM SERPL-MCNC: 9.7 MG/DL (ref 8.5–10.5)
CHLORIDE SERPL-SCNC: 99 MEQ/L (ref 98–111)
CO2 SERPL-SCNC: 24 MEQ/L (ref 23–33)
CREAT SERPL-MCNC: 0.7 MG/DL (ref 0.4–1.2)
DEPRECATED RDW RBC AUTO: 44.9 FL (ref 35–45)
EOSINOPHIL NFR BLD AUTO: 1 %
EOSINOPHILS ABSOLUTE: 0.1 THOU/MM3 (ref 0–0.4)
ERYTHROCYTE [DISTWIDTH] IN BLOOD BY AUTOMATED COUNT: 13.9 % (ref 11.5–14.5)
GFR SERPL CREATININE-BSD FRML MDRD: > 60 ML/MIN/1.73M2
GLUCOSE SERPL-MCNC: 210 MG/DL (ref 70–108)
HCT VFR BLD AUTO: 43.1 % (ref 42–52)
HGB BLD-MCNC: 14.7 GM/DL (ref 14–18)
IMM GRANULOCYTES # BLD AUTO: 0.03 THOU/MM3 (ref 0–0.07)
IMM GRANULOCYTES NFR BLD AUTO: 0.4 %
LYMPHOCYTES ABSOLUTE: 1.6 THOU/MM3 (ref 1–4.8)
LYMPHOCYTES NFR BLD AUTO: 22.5 %
MCH RBC QN AUTO: 30.3 PG (ref 26–33)
MCHC RBC AUTO-ENTMCNC: 34.1 GM/DL (ref 32.2–35.5)
MCV RBC AUTO: 88.9 FL (ref 80–94)
MONOCYTES ABSOLUTE: 0.6 THOU/MM3 (ref 0.4–1.3)
MONOCYTES NFR BLD AUTO: 8.9 %
NEUTROPHILS NFR BLD AUTO: 66.6 %
NRBC BLD AUTO-RTO: 0 /100 WBC
PLATELET # BLD AUTO: 234 THOU/MM3 (ref 130–400)
PMV BLD AUTO: 10.7 FL (ref 9.4–12.4)
POTASSIUM SERPL-SCNC: 4.4 MEQ/L (ref 3.5–5.2)
RBC # BLD AUTO: 4.85 MILL/MM3 (ref 4.7–6.1)
SEGMENTED NEUTROPHILS ABSOLUTE COUNT: 4.7 THOU/MM3 (ref 1.8–7.7)
SODIUM SERPL-SCNC: 135 MEQ/L (ref 135–145)
WBC # BLD AUTO: 7.1 THOU/MM3 (ref 4.8–10.8)

## 2024-01-22 PROCEDURE — 1180000000 HC REHAB R&B

## 2024-01-22 PROCEDURE — 97530 THERAPEUTIC ACTIVITIES: CPT

## 2024-01-22 PROCEDURE — 80048 BASIC METABOLIC PNL TOTAL CA: CPT

## 2024-01-22 PROCEDURE — 97129 THER IVNTJ 1ST 15 MIN: CPT

## 2024-01-22 PROCEDURE — 92526 ORAL FUNCTION THERAPY: CPT

## 2024-01-22 PROCEDURE — 6370000000 HC RX 637 (ALT 250 FOR IP): Performed by: FAMILY MEDICINE

## 2024-01-22 PROCEDURE — 97112 NEUROMUSCULAR REEDUCATION: CPT

## 2024-01-22 PROCEDURE — 99232 SBSQ HOSP IP/OBS MODERATE 35: CPT | Performed by: STUDENT IN AN ORGANIZED HEALTH CARE EDUCATION/TRAINING PROGRAM

## 2024-01-22 PROCEDURE — 97535 SELF CARE MNGMENT TRAINING: CPT

## 2024-01-22 PROCEDURE — 97130 THER IVNTJ EA ADDL 15 MIN: CPT

## 2024-01-22 PROCEDURE — 85025 COMPLETE CBC W/AUTO DIFF WBC: CPT

## 2024-01-22 PROCEDURE — 97110 THERAPEUTIC EXERCISES: CPT

## 2024-01-22 PROCEDURE — 97116 GAIT TRAINING THERAPY: CPT

## 2024-01-22 PROCEDURE — 6370000000 HC RX 637 (ALT 250 FOR IP): Performed by: NURSE PRACTITIONER

## 2024-01-22 PROCEDURE — 70450 CT HEAD/BRAIN W/O DYE: CPT

## 2024-01-22 PROCEDURE — 36415 COLL VENOUS BLD VENIPUNCTURE: CPT

## 2024-01-22 RX ADMIN — ATORVASTATIN CALCIUM 10 MG: 10 TABLET, FILM COATED ORAL at 09:13

## 2024-01-22 RX ADMIN — POLYETHYLENE GLYCOL 3350 17 G: 17 POWDER, FOR SOLUTION ORAL at 09:13

## 2024-01-22 RX ADMIN — HYDROCHLOROTHIAZIDE 12.5 MG: 25 TABLET ORAL at 09:13

## 2024-01-22 RX ADMIN — Medication 400 MG: at 19:34

## 2024-01-22 RX ADMIN — LISINOPRIL 10 MG: 10 TABLET ORAL at 09:13

## 2024-01-22 RX ADMIN — Medication 400 MG: at 09:13

## 2024-01-22 NOTE — DISCHARGE INSTR - COC
Continuity of Care Form    Patient Name: Romario Marie   :  1932  MRN:  [id number]    Admit date:  2024  Discharge date:  24    Code Status Order: Full Code   Advance Directives:     Admitting Physician:  Wendy Alvarenga DO  PCP: Steve Santoro MD    Discharging Nurse: ***  Discharging Hospital Unit/Room#: 8K-06/006-A  Discharging Unit Phone Number: [phone]    Emergency Contact:   Extended Emergency Contact Information  Primary Emergency Contact: Tasneem Marie   [facility]  Home Phone: [phone]  Mobile Phone: [phone]  Relation: Spouse  Secondary Emergency Contact: Ronda Song  Mobile Phone: [phone]  Relation: Child    Past Surgical History:  Past Surgical History:   Procedure Laterality Date    CHOLECYSTECTOMY      FEMUR FRACTURE SURGERY Left 12/15/2018    LEFT FEMUR INTERTAN NAIL JACKY INSERTION performed by Earle Burkett MD at [facility] OR    HIP FRACTURE SURGERY  12/15/2018    Left intertrochanteric nailing and jacky       Immunization History:   Immunization History   Administered Date(s) Administered    COVID-19, MODERNA Bivalent, (age 12y+), IM, 50 mcg/0.5 mL 2022, 2023       Active Problems:  Patient Active Problem List   Diagnosis Code    Closed displaced intertrochanteric fracture of left femur (HCC) S72.142A    Type 2 diabetes mellitus (HCC) E11.9    Subdural hemorrhage (HCC) I62.00    Accidental fall on or from stairs or steps W10.8XXA    Essential hypertension, benign I10    Paroxysmal atrial fibrillation (HCC) I48.0    SDH (subdural hematoma) (HCC) S06.5XAA    Permanent atrial fibrillation (HCC) I48.21    Subdural hematoma (HCC) S06.5XAA       Isolation/Infection:   Isolation            No Isolation          Patient Infection Status       None to display            Nurse Assessment:  Last Vital Signs: /72   Pulse 84   Temp 98.2 °F (36.8 °C) (Oral)   Resp 16   Ht 1.753 m (5' 9\")   Wt 73.1 kg (161 lb 3.2 oz)   SpO2 98%   BMI 23.81 kg/m²

## 2024-01-23 ENCOUNTER — APPOINTMENT (OUTPATIENT)
Age: 89
DRG: 950 | End: 2024-01-23
Attending: STUDENT IN AN ORGANIZED HEALTH CARE EDUCATION/TRAINING PROGRAM
Payer: MEDICARE

## 2024-01-23 VITALS
RESPIRATION RATE: 16 BRPM | SYSTOLIC BLOOD PRESSURE: 135 MMHG | TEMPERATURE: 97.9 F | HEART RATE: 75 BPM | WEIGHT: 159 LBS | BODY MASS INDEX: 23.55 KG/M2 | OXYGEN SATURATION: 96 % | HEIGHT: 69 IN | DIASTOLIC BLOOD PRESSURE: 83 MMHG

## 2024-01-23 LAB — ECHO BSA: 1.9 M2

## 2024-01-23 PROCEDURE — 97535 SELF CARE MNGMENT TRAINING: CPT

## 2024-01-23 PROCEDURE — 97116 GAIT TRAINING THERAPY: CPT

## 2024-01-23 PROCEDURE — 6370000000 HC RX 637 (ALT 250 FOR IP): Performed by: NURSE PRACTITIONER

## 2024-01-23 PROCEDURE — 97530 THERAPEUTIC ACTIVITIES: CPT

## 2024-01-23 PROCEDURE — 93270 REMOTE 30 DAY ECG REV/REPORT: CPT

## 2024-01-23 PROCEDURE — 99239 HOSP IP/OBS DSCHRG MGMT >30: CPT | Performed by: NURSE PRACTITIONER

## 2024-01-23 PROCEDURE — 92507 TX SP LANG VOICE COMM INDIV: CPT

## 2024-01-23 PROCEDURE — 6370000000 HC RX 637 (ALT 250 FOR IP): Performed by: FAMILY MEDICINE

## 2024-01-23 RX ORDER — POLYETHYLENE GLYCOL 3350 17 G/17G
17 POWDER, FOR SOLUTION ORAL DAILY PRN
Qty: 527 G | Refills: 1 | COMMUNITY
Start: 2024-01-23 | End: 2024-03-25

## 2024-01-23 RX ADMIN — Medication 400 MG: at 08:09

## 2024-01-23 RX ADMIN — HYDROCHLOROTHIAZIDE 12.5 MG: 25 TABLET ORAL at 08:09

## 2024-01-23 RX ADMIN — ATORVASTATIN CALCIUM 10 MG: 10 TABLET, FILM COATED ORAL at 08:09

## 2024-01-23 RX ADMIN — LISINOPRIL 10 MG: 10 TABLET ORAL at 08:09

## 2024-01-23 ASSESSMENT — PAIN SCALES - GENERAL: PAINLEVEL_OUTOF10: 0

## 2024-01-23 NOTE — CARE COORDINATION
Rmoario Marie was evaluated today and a DME order was entered for a wheeled walker because he requires this to successfully complete daily living tasks of personal cares, ambulating, and dressing upper body.  A wheeled walker is necessary due to the patient's unsteady gait, upper body weakness, and inability to  an ambulation device; and he can ambulate only by pushing a walker instead of a lesser assistive device such as a cane, crutch, or standard walker.  The need for this equipment was discussed with the patient and he understands and is in agreement.

## 2024-01-23 NOTE — DISCHARGE SUMMARY
Resident Discharge Summary (Hospitalist)      Patient Identification:   Romario Marie   : 1932  MRN: [id number]   Account: [id number]   Patient's PCP: Steve Santoro MD    Admit Date: 2024  Discharge Date:   2024    Admitting Physician: Wendy Alvarenga DO  Discharge Physician: Tim Hughes MD       Discharge Diagnoses:  Subdural hematoma, 2/2 mechanical fall isoper therapeutic INR: Presented trauma alert.  CT head with moderate focal hematoma left frontal/parietal region and small subdural hematoma left temporal/parietal region with thickness 7 mm, mixed density and no midline shift.  Initial INR 10.03, s/p Kcentra x 1 and FFP.  Neurosurgery and trauma following.  Neurochecks every 4 hours, NIHSS every shift.  Started on Keppra 500 mg p.o. twice daily.  Per patient's wife, patient  was previously taking warfarin incorrectly, 5 mg daily versus 2.5 mg on  and 5 mg . Repeat CT head demonstrated acute left cerebral convexity subdural hematoma, up to 0.6 cm, decrease in thickness when compared to prior study.  Minimal residual left-to-right midline shift, improved since prior study.  GCS 15  Holding warfarin and aspirin at this time 2/2 subdural hematoma, SCDs for DVT prophylaxis  Continue Keppra as noted above  Fall and seizure precautions in place.    Maintain systolic blood pressure less than 160.    Patient scheduled for repeat CT scan on  to assess for hematoma  Paroxysmal atrial fibrillation: [id number] 4.  On Coumadin for anticoagulation, no rate or rhythm control agents. Initial INR supratherapeutic in setting of bleed, as noted above.    Patient noted to be out of Afib , appears more regular with indiscernible P waves. NSR vs sinus arrythmia.   Patient to follow up Dr. Leon for evaluation of Watchman procedure  INR 1.80 1/10  HTN: Per neurology recommendations will maintain systolic blood pressure less than 160  Patient 
person  Mood: within normal limits  Affect: calm  General appearance: Patient is well nourished, well developed, well groomed and in no acute distress     Memory:   abnormal - deficits noted with conversation, not formally testing.    Attention/Concentration: normal  Language:  normal     Cranial Nerves:  no obvious deficits noted  ROM:  normal  Motor: Able to take resistance in all extremities  Tone:  normal  Muscle bulk: within normal limits  Sensory:  Sensation intact to light touch     Heart: well perfused extremities, RRR  Lungs: breathing comfortably on room air without any increased WOB, CTAB  Skin: warm and dry, no rash or erythema. Hematoma on scalp noted on left, ecchymosis around left eye and through left face noted.          Significant Diagnostics:   CBC with Differential:    Lab Results   Component Value Date/Time    WBC 7.1 01/22/2024 10:54 AM    RBC 4.85 01/22/2024 10:54 AM    HGB 14.7 01/22/2024 10:54 AM    HCT 43.1 01/22/2024 10:54 AM     01/22/2024 10:54 AM    MCV 88.9 01/22/2024 10:54 AM    MCH 30.3 01/22/2024 10:54 AM    MCHC 34.1 01/22/2024 10:54 AM    NRBC 0 01/22/2024 10:54 AM    SEGSPCT 66.6 01/22/2024 10:54 AM    MONOPCT 8.9 01/22/2024 10:54 AM    MONOSABS 0.6 01/22/2024 10:54 AM    LYMPHSABS 1.6 01/22/2024 10:54 AM    EOSABS 0.1 01/22/2024 10:54 AM    BASOSABS 0.0 01/22/2024 10:54 AM     BMP:    Lab Results   Component Value Date/Time     01/22/2024 10:54 AM    K 4.4 01/22/2024 10:54 AM    K 4.4 01/12/2024 07:09 AM    CL 99 01/22/2024 10:54 AM    CO2 24 01/22/2024 10:54 AM    BUN 19 01/22/2024 10:54 AM    LABALBU 3.8 01/15/2024 06:25 AM    CREATININE 0.7 01/22/2024 10:54 AM    CALCIUM 9.7 01/22/2024 10:54 AM    LABGLOM >60 01/22/2024 10:54 AM    GLUCOSE 210 01/22/2024 10:54 AM          Patient Instructions:   Home with Home Health   Therapy orders: PT, OT, and SLP   Discharge lab work: Per PCP  Code status: Full Code   Activity: activity as tolerated; not appropriate to 
1 or 2

## 2024-01-23 NOTE — PROGRESS NOTES
01/16/24 1239   Encounter Summary   Encounter Overview/Reason  Spiritual/Emotional Needs   Service Provided For: Patient   [facility]   Support System Spouse;Family members   Last Encounter  01/16/24   Complexity of Encounter Moderate   Begin Time 1230   End Time  1239   Total Time Calculated 9 min   Spiritual/Emotional needs   Type Spiritual Support   Assessment/Intervention/Outcome   Assessment Coping   Intervention Nurtured Hope;Prayer (assurance of)/[place];Sustaining Presence/Ministry of presence   Outcome Comfort;Coping;Encouraged     Assessment:  In my encounter with the 89 yr old patient the pt's family was supportively present. While rounding the unit 8K,  I provided spiritual care to patient and their family through conversation, I also came to assess their spiritual needs present. The pt was admitted due to subdural hematoma.     Interventions:  I provided, prayer, emotional support and words of comfort.  provided a listening presence and encouraged pt to share their beliefs and how they support him during their hospitalization.       Outcomes:  The patient was encouraged and didn't share any further spiritual needs at this time. The pt remains optimistic and hopeful. The pt shared that they were appreciative for the support.     Plan:  Chaplains will follow-up at a later time for assessment of any spiritual care needs present.  
   Physical Medicine & Rehabilitation   Progress Note    Chief Complaint:  SDH.    History of Present Illness: Patient seen and examined this afternoon with wife at bedside. Overall, patient reports that he is doing well. He reports good sleep overnight. No reports of CP or SOB. No reports of any significant changes to bowel or bladder. He is tolerating therapies. Wife is pleased with the therapy sessions she observed today. We discussed plans for follow up head CT tomorrow     Current Rehabilitation Assessments:   PT 01/17/2024:  Ambulation:  Contact Guard Assistance, Minimal Assistance  Distance: 6', ~30', ~50', 6'  Surface: Level Tile  Device:Cane  Gait Deviations:  Forward Flexed Posture, Mild Path Deviations, and Unsteady Gait     Pt noted to ambulate with good donna, and forward trunk flexion. He needed cues to reorient to his path, and to remind him to sequence his cane for increased support as he forgot this at times. He did catch his toe on one object in his environment needed cues and min A to recover this.      Stairs:  Contact Guard Assistance, Minimal Assistance  Number of Steps: 1 x3 reps   Height: 6\" step with one handrail and cane     Demo, and max cues along with min A required initially to orient pt to safe sequencing and completion of step. Improved with second two trials to complete with CGA      Balance:  Static Sitting Balance:  Stand By Assistance  Dynamic Sitting Balance: Stand By Assistance  Static Standing Balance: Contact Guard Assistance  Dynamic Standing Balance: Contact Guard Assistance, Minimal Assistance     Neuromuscular Facilitation:  Pt. Completed standing, dynamic gait, and multi-tasking activities using Intermittent UE support on Cane to improve postural awareness and Environmental awareness for improved functional mobility.      The patient completed the following dynamic balance task with intermittent UE support and CGA to min A for improved stability and balance reactions 
   Physical Medicine & Rehabilitation   Progress Note    Chief Complaint:  SDH.    History of Present Illness: Patient seen and examined. JUSTINIVETHBIENVENIDO. He was seen this morning working with PT. Wife is present. No reports of any CP or SOB. No reports of any significant changes to bowel or bladder. He continues to tolerate and progress with therapies.    We reviewed the CT results from this morning.    Impression:         Small thin subdural hematoma lateral to the left temporal lobe. This has decreased in size.         Current Rehabilitation Assessments:   PT 01/17/2024:  Ambulation:  Contact Guard Assistance, Minimal Assistance  Distance: 6', ~30', ~50', 6'  Surface: Level Tile  Device:Cane  Gait Deviations:  Forward Flexed Posture, Mild Path Deviations, and Unsteady Gait     Pt noted to ambulate with good donna, and forward trunk flexion. He needed cues to reorient to his path, and to remind him to sequence his cane for increased support as he forgot this at times. He did catch his toe on one object in his environment needed cues and min A to recover this.      Stairs:  Contact Guard Assistance, Minimal Assistance  Number of Steps: 1 x3 reps   Height: 6\" step with one handrail and cane     Demo, and max cues along with min A required initially to orient pt to safe sequencing and completion of step. Improved with second two trials to complete with CGA      Balance:  Static Sitting Balance:  Stand By Assistance  Dynamic Sitting Balance: Stand By Assistance  Static Standing Balance: Contact Guard Assistance  Dynamic Standing Balance: Contact Guard Assistance, Minimal Assistance     Neuromuscular Facilitation:  Pt. Completed standing, dynamic gait, and multi-tasking activities using Intermittent UE support on Cane to improve postural awareness and Environmental awareness for improved functional mobility.      The patient completed the following dynamic balance task with intermittent UE support and CGA to min A for improved 
   Physical Medicine & Rehabilitation   Progress Note    Chief Complaint:  SDH. Rehab needs     History of Present Illness:  Romario Marie is a 89 y.o. male admitted to [facility] on 1/11/2024. Patient  has a past medical history of Diabetes mellitus (HCC), Essential hypertension, H/O prostate cancer, Hyperlipidemia, and Paroxysmal atrial fibrillation (HCC). Patient presented to [facility] ER after suffering a fall at home. Patient was carrying a basket down stairs and missed the bottom steps, causing a fall. Patient with atrial fibrillation and on coumadin.  CT head with moderate focal hematoma left frontal/parietal region and small subdural hematoma left temporal/parietal region with thickness 7 mm, mixed density and no midline shift.  Initial INR 10.03, s/p Kcentra x 1 and FFP. Patient was admitted to ICU. Repeat INR was 2. F/u CTH demonstrated acute left cerebral convexity subdural hematoma, up to 0.6 cm, decrease in thickness when compared to prior study, Minimal residual left-to-right midline shift, improved since prior study. Patient was evaluated by neurosurgery. No surgery planned. Keppra BID for one week, hold coumadin, plan f/u CTH in 3 days and 1 week. Patient with need for Cardene gtt for BP, this was stopped 1/8. Patient as transferred out of ICU to neuro stepdown on 1/8/24. Patient with SLP evaluation, wife noted to be at baseline due to dementia, but new findings of issues with sequencing with mobility noted with PT and OT. PM&R was consulted to evaluate further for IPR consideration.     1/9/24: Patient seen today in consult. Patient sitting up in chair. Wife present. Patient with noted hematoma over left scalp and ecchymosis around eye and down left face. Patient denies any headache. Patient is very pleasant. Discussed concerns with therapy and needing further therapy prior to discharge. Patient understanding. Despite dementia at baseline, patient was pretty helpful around the house and 
 [facility]  INPATIENT PHYSICAL THERAPY  DAILY NOTE  [facility] - 8K-06/006-A    Time In: 0700  Time Out: 0730  Timed Code Treatment Minutes: 30 Minutes  Minutes: 30          Date: 2024  Patient Name: Romario Marie,  Gender:  male        MRN: [id number]  : 1932  (89 y.o.)  Referral Date : 24  Referring Practitioner: Maria M Dacosta APRN - CNP  Diagnosis: Subdural Hematoma  Additional Pertinent Hx: Per H&P: Romario Marie  is a 89 y.o. male with PMH significant for T2DM, HTN, prostate cancer, and paroxysmal a-fib who is being admitted to the inpatient rehabilitation unit on 2024.  History obtained via: ED documentation, acute care documentation, and patient      Patient initially presented to [facility] ED on 2024 following a mechanical fall in which he tripped going down his basement steps striking his left forehead on the concrete floor. No LOC. Anticoagulated on Warfarin for history of a-fib. On arrival, wife reported history of dementia but had not noted any cognitive changes. CT head obtained and reportedly revealed an acute left sided SDH.     Prior Level of Function:  Lives With: Spouse  Type of Home: House  Home Layout: Two level, Bed/Bath upstairs (there is a bathroom on main floor as well)  Home Access: Stairs to enter without rails  Entrance Stairs - Number of Steps: 2 WILLIAMS, flight of steps to 2nd floor with right hand rail  Home Equipment: Cane, Walker, rolling   Bathroom Shower/Tub: Tub/Shower unit, Walk-in shower  Bathroom Toilet: Handicap height  Bathroom Equipment: Built-in shower seat, Grab bars in shower  Bathroom Accessibility: Accessible    Receives Help From: Family  ADL Assistance: Independent  Homemaking Assistance:  (shares duties with wife)  Ambulation Assistance: Independent  Transfer Assistance: Independent  Active : No  Additional Comments: Pt performs a lot of the cooking at home--pre-packaged meals 
 [facility]  INPATIENT PHYSICAL THERAPY  DAILY NOTE  [facility] - 8K-06/006-A    Time In: 0700  Time Out: 0830  Timed Code Treatment Minutes: 90 Minutes  Minutes: 90          Date: 2024  Patient Name: Romario Marie,  Gender:  male        MRN: [id number]  : 1932  (89 y.o.)  Referral Date : 24  Referring Practitioner: Maria M Dacosta APRN - CNP  Diagnosis: Subdural Hematoma  Additional Pertinent Hx: Per H&P: Romario Marie  is a 89 y.o. male with PMH significant for T2DM, HTN, prostate cancer, and paroxysmal a-fib who is being admitted to the inpatient rehabilitation unit on 2024.  History obtained via: ED documentation, acute care documentation, and patient      Patient initially presented to [facility] ED on 2024 following a mechanical fall in which he tripped going down his basement steps striking his left forehead on the concrete floor. No LOC. Anticoagulated on Warfarin for history of a-fib. On arrival, wife reported history of dementia but had not noted any cognitive changes. CT head obtained and reportedly revealed an acute left sided SDH.     Prior Level of Function:  Lives With: Spouse  Type of Home: House  Home Layout: Two level, Bed/Bath upstairs (there is a bathroom on main floor as well)  Home Access: Stairs to enter without rails  Entrance Stairs - Number of Steps: 2 WILLIAMS, flight of steps to 2nd floor with right hand rail  Home Equipment: Cane, Walker, rolling   Bathroom Shower/Tub: Tub/Shower unit, Walk-in shower  Bathroom Toilet: Handicap height  Bathroom Equipment: Built-in shower seat, Grab bars in shower  Bathroom Accessibility: Accessible    Receives Help From: Family  ADL Assistance: Independent  Homemaking Assistance:  (shares duties with wife)  Ambulation Assistance: Independent  Transfer Assistance: Independent  Active : No  Additional Comments: Pt performs a lot of the cooking at home--pre-packaged meals 
 [facility]  INPATIENT PHYSICAL THERAPY  [facility] - 8K-06/006-A  Daily Note    Time In: 1100  Time Out: 1130  Timed Code Treatment Minutes: 30 Minutes  Minutes: 30          Date: 1/15/2024  Patient Name: Romario Marie,  Gender:  male        MRN: [id number]  : 1932  (89 y.o.)  Referral Date : 24  Referring Practitioner: Maria M Dacosta APRN - CNP  Diagnosis: Subdural Hematoma  Additional Pertinent Hx: Per H&P: Romario Marie  is a 89 y.o. male with PMH significant for T2DM, HTN, prostate cancer, and paroxysmal a-fib who is being admitted to the inpatient rehabilitation unit on 2024.  History obtained via: ED documentation, acute care documentation, and patient      Patient initially presented to [facility] ED on 2024 following a mechanical fall in which he tripped going down his basement steps striking his left forehead on the concrete floor. No LOC. Anticoagulated on Warfarin for history of a-fib. On arrival, wife reported history of dementia but had not noted any cognitive changes. CT head obtained and reportedly revealed an acute left sided SDH.     Prior Level of Function:  Lives With: Spouse  Type of Home: House  Home Layout: Two level, Bed/Bath upstairs (there is a bathroom on main floor as well)  Home Access: Stairs to enter without rails  Entrance Stairs - Number of Steps: 2 WILLIAMS, flight of steps to 2nd floor with right hand rail  Home Equipment: Cane, Walker, rolling   Bathroom Shower/Tub: Tub/Shower unit, Walk-in shower  Bathroom Toilet: Handicap height  Bathroom Equipment: Built-in shower seat, Grab bars in shower  Bathroom Accessibility: Accessible    Receives Help From: Family  ADL Assistance: Independent  Homemaking Assistance:  (shares duties with wife)  Ambulation Assistance: Independent  Transfer Assistance: Independent  Active : No  Additional Comments: Pt performs a lot of the cooking at home--pre-packaged meals 
 [facility]  INPATIENT PHYSICAL THERAPY  DAILY NOTE  [facility] - 8K-06/006-A    Time In: 1130  Time Out: 1200  Timed Code Treatment Minutes: 30 Minutes  Minutes: 30          Date: 2024  Patient Name: Romario Marie,  Gender:  male        MRN: [id number]  : 1932  (89 y.o.)  Referral Date : 24  Referring Practitioner: Maria M Dacosta APRN - CNP  Diagnosis: Subdural Hematoma  Additional Pertinent Hx: Per H&P: Romario Marie  is a 89 y.o. male with PMH significant for T2DM, HTN, prostate cancer, and paroxysmal a-fib who is being admitted to the inpatient rehabilitation unit on 2024.  History obtained via: ED documentation, acute care documentation, and patient      Patient initially presented to [facility] ED on 2024 following a mechanical fall in which he tripped going down his basement steps striking his left forehead on the concrete floor. No LOC. Anticoagulated on Warfarin for history of a-fib. On arrival, wife reported history of dementia but had not noted any cognitive changes. CT head obtained and reportedly revealed an acute left sided SDH.     Prior Level of Function:  Lives With: Spouse  Type of Home: House  Home Layout: Two level, Bed/Bath upstairs (there is a bathroom on main floor as well)  Home Access: Stairs to enter without rails  Entrance Stairs - Number of Steps: 2 WILLIAMS, flight of steps to 2nd floor with right hand rail  Home Equipment: Cane, Walker, rolling   Bathroom Shower/Tub: Tub/Shower unit, Walk-in shower  Bathroom Toilet: Handicap height  Bathroom Equipment: Built-in shower seat, Grab bars in shower  Bathroom Accessibility: Accessible    Receives Help From: Family  ADL Assistance: Independent  Homemaking Assistance:  (shares duties with wife)  Ambulation Assistance: Independent  Transfer Assistance: Independent  Active : No  Additional Comments: Pt performs a lot of the cooking at home--pre-packaged meals 
 [facility]  INPATIENT PHYSICAL THERAPY  [facility] - 8K-06/006-A  Daily Note    Time In: 1030  Time Out: 1200  Timed Code Treatment Minutes: 90 Minutes  Minutes: 90          Date: 2024  Patient Name: Romario Marie,  Gender:  male        MRN: [id number]  : 1932  (89 y.o.)  Referral Date : 24  Referring Practitioner: Maria M Dacosta APRN - CNP  Diagnosis: Subdural Hematoma  Additional Pertinent Hx: Per H&P: Romario Marie  is a 89 y.o. male with PMH significant for T2DM, HTN, prostate cancer, and paroxysmal a-fib who is being admitted to the inpatient rehabilitation unit on 2024.  History obtained via: ED documentation, acute care documentation, and patient      Patient initially presented to [facility] ED on 2024 following a mechanical fall in which he tripped going down his basement steps striking his left forehead on the concrete floor. No LOC. Anticoagulated on Warfarin for history of a-fib. On arrival, wife reported history of dementia but had not noted any cognitive changes. CT head obtained and reportedly revealed an acute left sided SDH.     Prior Level of Function:  Lives With: Spouse  Type of Home: House  Home Layout: Two level, Bed/Bath upstairs (there is a bathroom on main floor as well)  Home Access: Stairs to enter without rails  Entrance Stairs - Number of Steps: 2 WILLIAMS, flight of steps to 2nd floor with right hand rail  Home Equipment: Cane, Walker, rolling   Bathroom Shower/Tub: Tub/Shower unit, Walk-in shower  Bathroom Toilet: Handicap height  Bathroom Equipment: Built-in shower seat, Grab bars in shower  Bathroom Accessibility: Accessible    Receives Help From: Family  ADL Assistance: Independent  Homemaking Assistance:  (shares duties with wife)  Ambulation Assistance: Independent  Transfer Assistance: Independent  Active : No  Additional Comments: Pt performs a lot of the cooking at home--pre-packaged meals 
 [facility]  INPATIENT PHYSICAL THERAPY  DAILY NOTE  [facility] - 8K-06/006-A    Time In: 1030  Time Out: 1200  Timed Code Treatment Minutes: 90 Minutes  Minutes: 90          Date: 2024  Patient Name: Romario Marie,  Gender:  male        MRN: [id number]  : 1932  (89 y.o.)  Referral Date : 24  Referring Practitioner: Maria M Dacosta APRN - CNP  Diagnosis: Subdural Hematoma  Additional Pertinent Hx: Per H&P: Romario Marie  is a 89 y.o. male with PMH significant for T2DM, HTN, prostate cancer, and paroxysmal a-fib who is being admitted to the inpatient rehabilitation unit on 2024.  History obtained via: ED documentation, acute care documentation, and patient      Patient initially presented to [facility] ED on 2024 following a mechanical fall in which he tripped going down his basement steps striking his left forehead on the concrete floor. No LOC. Anticoagulated on Warfarin for history of a-fib. On arrival, wife reported history of dementia but had not noted any cognitive changes. CT head obtained and reportedly revealed an acute left sided SDH.     Prior Level of Function:  Lives With: Spouse  Type of Home: House  Home Layout: Two level, Bed/Bath upstairs (there is a bathroom on main floor as well)  Home Access: Stairs to enter without rails  Entrance Stairs - Number of Steps: 2 WILLIAMS, flight of steps to 2nd floor with right hand rail  Home Equipment: Cane, Walker, rolling   Bathroom Shower/Tub: Tub/Shower unit, Walk-in shower  Bathroom Toilet: Handicap height  Bathroom Equipment: Built-in shower seat, Grab bars in shower  Bathroom Accessibility: Accessible    Receives Help From: Family  ADL Assistance: Independent  Homemaking Assistance:  (shares duties with wife)  Ambulation Assistance: Independent  Transfer Assistance: Independent  Active : No  Additional Comments: Pt performs a lot of the cooking at home--pre-packaged meals 
ASSESSMENT  This is a 89-yr-old patient who has subdural hematoma. His wife was sitting beside him at the time of visit. The patient welcomed me. I engaged him in a conversation about his health and wellbeing. He was opened to prayer but was religiously unaffiliated. In the support system are his two adult children and his wife.     INTERVENTION    Conversation with the patient and his wife. He narrated his story of working as a , which he loved. 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   Discussed Baptism life and any possible association with a Zoroastrian/Baptism community. He does not have a Zoroastrian community in his support system.   The prayer was readily accepted. Prayed for him.     OUTCOME    Thanked me for stopping by.     CARE PLAN  He was feeling better. He is looking forward to going home soon.   
Admitted to the Inpatient Rehabilitation Unit via wheelchair.  Patient was then oriented to room and unit.  Education provided on the rehabilitation routine: three hours of therapy five days per week.      Explained patients right to have family, representative or physician notified of their admission.  Patient has Requested for physician to be notified.  Patient has Declined for family/representative to be notified.    Admitting medication orders compared with acute stay medications; home medication list reviewed with patient/family.  Medication issues identified No      Transportation:   Has transportation kept you from medical appointments, meetings, work, or from getting things needed for daily living? (Check all that apply)  No.      Health Literacy:   How often do you need to have someone help you when you read instructions, pamphlets, or other written material from your doctor or pharmacy?  4. - Always    Social Isolation:  How often do you feel lonely or isolated from those around you?  0. Never    Patient Mood Interview (PHQ-2 to 9) (from [employer]©)   Say to Patient: \"Over the last 2 weeks, have you been bothered by any of the following problems?\"   If symptom is present, enter yes in column 1 (Symptom Presence)  If yes in column 1, then ask the patient: “About how often have you been bothered by this?” Indicate response in column 2, Symptom Frequency.   Symptom Presence  No    Yes   9.    No response  Symptom Frequency  Never or 1 day  2-6 days (several days)  7-11 days (half or more of the days)  12-14 days (nearly every day)    Symptom Presence Symptom Frequency   Little interest or pleasure in doing things 0. No 0. Never   Feeling down, depressed, or hopeless 0. No 0. Never or 1 day   If either A or B above has symptom frequency coded 2 or 3, CONTINUE asking questions below.      If not, END the interview  and right click on next table to delete.               Pain:  Pain Effect on Sleep    Ask 
[facility]  INPATIENT SPEECH THERAPY  [facility]  DISCHARGE NOTE    TIME   SLP Individual Minutes  Time In: 1105  Time Out: 1125  Minutes: 20  Timed Code Treatment Minutes: 0 Minutes   Speech/Language Therapy: 20 minutes    Date: 2024  Patient Name: Romario Marie      CSN: [id number]   : 1932  (89 y.o.)  Gender: male   Referring Physician:  Wendy Alvarenga DO  Diagnosis: subdural hematoma  Precautions: fall precautions  Current Diet: Regular solids. Thin liquids.  Respiratory Status: Room Air  Swallowing Strategies:  Full Upright Position, Small Bite/Sip, Alternate Solids and Liquids, Limit Distractions, and Monitor for Fatigue   Date of Last MBS/FEES: Not Applicable    Pain:  No pain reported.    Subjective:  Patient sitting in recliner upon ST arrival. Agreeable to skilled ST services; pleasant and cooperative throughout. Patient's wife present and actively engaged for duration of therapy session with completion of family education this date.     Family Education  Safety   Keep a list of emergency contacts (family members, doctors) in an easy-to-access spot (ICE in cell phone Contacts, by the landline phone, on the refrigerator)   Use your Life Alert button (necklace or bracelet)   Memory    Keep a detailed calendar with assistance from a family member to organize important events, doctors’ appts, birthdays, etc.   Use a notebook to make lists, keep a memory log, etc.   Keep the notepad or list in the same spot after every time you’ve written in it    Try to use your memory strategies daily.   WRAP: Write It, Rehearse It, Associate It, Picture It    Organization   Medication Management   Have a family member/friend fill out and review your current medications/supplements with you at the same time every week.    May consider looking into a computerized med box   Finance Management   Allow your family/trusted friend to assist you in managing the finances as 
[facility]  INPATIENT SPEECH THERAPY  [facility]  DAILY NOTE    TIME   SLP Individual Minutes  Time In: 1400  Time Out: 1430  Minutes: 30  Timed Code Treatment Minutes: 30 Minutes       Date: 2024  Patient Name: Romario Marie      CSN: [id number]   : 1932  (89 y.o.)  Gender: male   Referring Physician:  Wendy Alvarenga DO  Diagnosis: subdural hematoma  Precautions: fall precautions  Current Diet: Regular solids. Thin liquids.  Respiratory Status: Room Air  Swallowing Strategies:  Full Upright Position, Small Bite/Sip, Alternate Solids and Liquids, Limit Distractions, and Monitor for Fatigue   Date of Last MBS/FEES: Not Applicable    Pain:  No pain reported.    Subjective:  Patient seen while resting in chair. Pleasant, cooperative, and engaged with ST. Patient's wife, Tasneem, present at patient's chair side throughout session.     Short-Term Goals:  SHORT TERM GOAL #1:  Goal 1: Patient will safely consume a regular diet and thin liquids with implementation of compensatory strategies and stable pulmonary status to meet hydration/nutrition needs.  INTERVENTIONS: Not directly addressed due to focus on other goals.    SHORT TERM GOAL #2:  Goal 2: Patient will complete functional carryover tasks (orientation with environmental aids, biographics, call light) with 50% accuracy with mod cues to improve awareness and participation in current environment.  INTERVENTIONS:  Training Use of Personalized External Aid:  ST utilized errorless learning techniques to promote improved orientation and biographical recall related to patient's self.  With use of personal external aid, ST prompted patient to recall the following:  Age: independent without aid  Daughter's name: independent with aid use  Son's name: mod carrier phrase cues (I.e., \"Ronda is your daughter and Homero is your ...\")  : independent with aid use  Wife's name: min assist to use aid  Self: Independent with 
[facility]  Occupational Therapy  Daily Note  Time:    Time In: 659  Time Out: 0759  Timed Code Treatment Minutes: 60 Minutes  Minutes: 60          Date: 2024  Patient Name: Romario Marie,   Gender: male      Room: [facility]06/006-A  MRN: [id number]  : 1932  (89 y.o.)  Referring Practitioner: ordering: Maria M Dacosta APRN - CNP, attending: Wendy Alvarenga DO  Diagnosis: subdural hematoma  Additional Pertinent Hx: Romario is an 89 year old male whom presented to [facility] on 24 as pt encountered a fall at home as he missed a step.  CT imaging indicated an acute subdural hemorrhage on the L side.  Hospital course was complicated by HTN, hypokalemia and hypomagnesemia. Pt. addmitted to the [facility] on 24 for further medical care and referred to skilled OT services at this time.    Restrictions/Precautions:  Restrictions/Precautions: General Precautions, Fall Risk  Position Activity Restriction  Other position/activity restrictions: per trauma note: \"Maintain systolic blood pressure between 100-160\", pt has baseline demenita, monitor bradycardia, Dr. Henry verbal order states ok for mobility with this if asymptomatic      SUBJECTIVE: Upon arrival pt in bathroom with nurse and agreeable to OT session and to shower this AM.    PAIN: 0 /10: as pt denies    Vitals: Vitals not assessed per clinical judgement, see nursing flowsheet    COGNITION: Decreased Insight, Decreased Problem Solving, Decreased Safety Awareness, and Impulsive    ADL:   Grooming: Pt. requires SBA to complete oral care tasks while standing sinkside with cues for initiation .  Pt. Required min A to sequence through utilizing deodorant this date at a seated level.     Bathing: Pt required initial set up of soap, wash cloth and turning on water.  Once set up, pt seated on shower chair to complete bathing task with Supervision and required min vc to ensure washing of all 
[facility]  Recreational Therapy  Daily Note  [facility]    Time Spent with Patient: 10 minutes    Date:  1/23/2024       Patient Name: Romario Marie      MRN: [id number]      YOB: 1932 (89 y.o.)       Gender: male  Diagnosis: subdural hematoma  Referring Practitioner: ordering: Maria M Dacosta APRN - CNP, attending: Wendy Alvarenga DO    RESTRICTIONS/PRECAUTIONS:  Restrictions/Precautions: General Precautions, Fall Risk     Hearing: Exceptions to WFL  Hearing Exceptions: Hard of hearing/hearing concerns    PAIN: 0    SUBJECTIVE:  I like anything    OBJECTIVE:  Pt and wife enjoyed listening to our  Osmany play music for them before going home today-pt's wife remembered Osmany from years ago playing music for their son who is autistic and Osmany's son at the same venue-affect bright and social-so appreciative          Patient Education  New Education Provided: Importance of Leisure,     Electronically signed by: Shakila Cardona, CTRS  Date: 1/23/2024  
Comprehensive Nutrition Assessment    Type and Reason for Visit:  Initial, Consult (nutritional assessment, ONS)    Nutrition Recommendations/Plan:   Consider MVI.  ONS initiated: Glucerna BID.  Continue current diet.      Malnutrition Assessment:  Malnutrition Status:  At risk for malnutrition (Comment) (01/12/24 1309)    Context:  Acute Illness     Findings of the 6 clinical characteristics of malnutrition:  Energy Intake:  Mild decrease in energy intake (Comment)  Weight Loss:  No significant weight loss     Body Fat Loss:  No significant body fat loss     Muscle Mass Loss:  No significant muscle mass loss    Fluid Accumulation:  No significant fluid accumulation     Strength:  Not Performed    Nutrition Assessment:     Pt. nutritionally compromised AEB some meal intake less than 75%.  At risk for further nutrition compromise r/t admit with SDH, fall at home, hematoma left temple, and underlying medical condition (hx: afib, HTN, HLD, DM, prostate Ca, dementia).       Nutrition Related Findings:    Pt. Report/Treatments/Miscellaneous: Patient seen before breakfast this morning, sitting in bedside chair. Reports appetite \"okay\".  He didn't think he had lost any weight recently.  Agreeable to ONS.   GI Status: BM 1/11/24  Pertinent Labs: 1/11/24: Glucose 150, Chemstick 131, 140, 144  Pertinent Meds: lpitor, hydrodiuril, keppra, lisinopril, glycolax     Wound Type:  (left upper face traumatic-open to air)       Current Nutrition Intake & Therapies:    Average Meal Intake: 51-75%, %     ADULT DIET; Regular; 4 carb choices (60 gm/meal)  ADULT ORAL NUTRITION SUPPLEMENT; Breakfast, Dinner; Diabetic Oral Supplement    Anthropometric Measures:  Height: 175.3 cm (5' 9\")  Ideal Body Weight (IBW): 160 lbs (73 kg)    Admission Body Weight: 74.5 kg (164 lb 3.2 oz) (1/11/24 no edema)  Current Body Weight: 80 kg (176 lb 5.9 oz) (1/12/24 bedscale, no edema),    Current BMI (kg/m2): 26  Usual Body Weight:  (\"I don't 
Comprehensive Nutrition Assessment    Type and Reason for Visit:  Reassess    Nutrition Recommendations/Plan:   Recommend diet as tolerated.  Will increase Glucerna ONS to TID as pt. Reports acceptance.  Recommend MVI.  Encouraged po, ONS at best efforts for healing.     Malnutrition Assessment:  Malnutrition Status:  At risk for malnutrition (Comment) (01/12/24 1309)    Context:  Acute Illness     Findings of the 6 clinical characteristics of malnutrition:  Energy Intake:  Mild decrease in energy intake (Comment)  Weight Loss:  No significant weight loss     Body Fat Loss:  No significant body fat loss     Muscle Mass Loss:  No significant muscle mass loss    Fluid Accumulation:  No significant fluid accumulation     Strength:  Not Performed    Nutrition Assessment:     At risk for nutrition compromise r/t inadequate oral intake, admit with SDH, fall at home, hematoma left temple, advanced age and underlying medical condition (hx: afib, HTN, HLD, DM, prostate Ca, dementia).       Nutrition Related Findings:      Wound Type:  (left upper face traumatic-open to air)     Pt. Report/Treatments/Miscellaneous: pt. Seen w/ wife; reports poor appetite; wife states not eating much but reports didn't eat much at home; states drinking all of Glucerna - agrees to receive TID; denies any trouble tolerating diet; SLP following  GI Status: 1 BM noted past 24 hours  Pertinent Labs: 1/15: Glucose 143, BUN 16, Cr 0.7  Pertinent Meds: Zofran, Glycolax, Lipitor      Current Nutrition Intake & Therapies:    Average Meal Intake: 51-75%, %  Average Supplements Intake:  (states acceptance)  ADULT DIET; Regular; 4 carb choices (60 gm/meal)  ADULT ORAL NUTRITION SUPPLEMENT; Breakfast, Dinner, Lunch; Diabetic Oral Supplement    Anthropometric Measures:  Height: 175.3 cm (5' 9\")  Ideal Body Weight (IBW): 160 lbs (73 kg)    Admission Body Weight: 74.5 kg (164 lb 3.2 oz) (1/11/24 no edema)  Current Body Weight: 70.8 kg (156 lb 1.4 oz) 
Discharge pain assessment:    Complete within 3 days of discharge.      Pain Effect on Sleep ()   Ask patient: \"Over the past 5 days, how much of the time has pain made it hard for you to sleep at night?\" 1.  Rarely or not at all     If no pain is reported, end interview.  If pain is reported, continue with the additional questions.   Pain Interference with Therapy Activities   Ask patient: \"Over the past 5 days, how often have you limited your participation in rehabilitation therapy sessions due to pain?\" 1.  Rarely or not at all   Pain Interference with Day to Day Activities   Ask patient: \"Over the past 5 days, how often have you limited your day to day activities (excluding rehabilitation therapy sessions) because of pain?\" 1.  Rarely or not at all      
[facility]  INPATIENT OCCUPATIONAL THERAPY  [facility]  EVALUATION    Time:   Time In: 0700  Time Out: 0830  Timed Code Treatment Minutes: 75 Minutes  Minutes: 90          Date: 2024  Patient Name: Romario Marie,   Gender: male      MRN: [id number]  : 1932  (89 y.o.)  Referring Practitioner: ordering: Maria M Dacosta APRN - CNP, attending: Wendy Alvarenga DO  Diagnosis: subdural hematoma  Additional Pertinent Hx: Romario is an 89 year old male whom presented to [facility] on 24 as pt encountered a fall at home as he missed a step.  CT imaging indicated an acute subdural hemorrhage on the L side.  Hospital course was complicated by HTN, hypokalemia and hypomagnesemia. Pt. addmitted to the [facility] on 24 for further medical care and referred to skilled OT services at this time.    Restrictions/Precautions:  Restrictions/Precautions: General Precautions, Fall Risk  Position Activity Restriction  Other position/activity restrictions: per trauma note: \"Maintain systolic blood pressure between 100-160\", pt has baseline demenita    Subjective  Chart Reviewed: Yes, Orders, Progress Notes, History and Physical  Patient assessed for rehabilitation services?: Yes  Family / Caregiver Present: No     Pt. Resting in bed upon OT arrival and agreeable to OT session.     Pain: Pt. denies    Vitals: BP: 139/83 and HR 83, nurse made aware    Social/Functional History:  Lives With: Spouse  Type of Home: House  Home Layout: Two level, Bed/Bath upstairs  Home Access: Stairs to enter without rails  Entrance Stairs - Number of Steps: 2 WILLIAMS, flight of steps to 2nd floor with bilateral handrail  Home Equipment: Cane, Walker, rolling   Bathroom Shower/Tub: Tub/Shower unit, Walk-in shower  Bathroom Toilet: Handicap height  Bathroom Equipment: Built-in shower seat, Grab bars in shower  Bathroom Accessibility: Accessible       ADL Assistance: Independent  Homemaking 
[facility]  INPATIENT REHABILITATION  TEAM CONFERENCE NOTE    Conference Date: 2024  Admit Date:  2024  1:51 PM  Patient Name: Romario Marie    MRN: [id number]    : 1932  (89 y.o.)  Rehabilitation Admitting Diagnosis:  Subdural hematoma (HCC) [S06.5XAA]  Referring Practitioner: Maria M Dacosta APRN - CNP      CASE MANAGEMENT  Current issues/needs regarding patient and family discharge status: Prior to admission, patient was living with his wife, Tasneem. Patient reported being independent at home. Patient was completing his ADLs and some housekeeping. Patient shares household tasks with Tasneem. Tasneem manages the cooking, finances and transportation. Patient is a retired  from the [employer]. Patient's main support is Tasneem. They have a daughter, Ronda, who lives in [place]. Patient's family physician is Steve Santoro MD. Patient prefers [facility]. Patient has a cane and walker at home. Patient was not using assistive devices at home. Only when he was out of the home. Patient is motivated to participate in therapy and return to his prior level of functioning.     PHYSICAL THERAPY  Mr Marie met 0/5 STG's and 0/7 LTG's.  Pt has not met any goals, however with short length of stay on inpatient rehab.  Pt is demonstrating gradual progress however, progressing sit < > stand transfers from CGA to SBA/CGA, MAYES balance test from 27 to 33/56 and is able to ascend/descend 12 steps with 1 hand rail with CGA.  Patient's MAYES score continues to indicate medium risk for falls at this time.   Pt limited by impaired balance and cognition. Pt has flight of steps to bedroom.  Mr Marie will benefit from continued skilled PT services to continue to improve his ability to complete functional mobility, reduce burden of care on caregiver and reduce his risk for falls.  Equipment Needed: No    SPEECH THERAPY  Patient achieved 2/6 STGs and 0/3 LTGs this therapy reporting period. 
[facility]  Diagnosis List for Inpatient Rehab facility (IRF) - Patient Assessment Instrument (ZANDRA)    Patient Name: Romario Marie        MRN: [id number]    : 1932  (89 y.o.)  Gender: male     Primary impairment requiring rehabilitation: 2. Traumatic, Closed brain injury     Etiologic Diagnosis that led to the condition: Subdural hematoma    Comorbid conditions affecting rehabilitation:  Subdural hematoma  Fall at home  Supra therapeutic INR on admission (10)  Hematoma left temple   Paroxysmal atrial fibrillation  HTN  HLD  NIDDMII  History of prostate cancer  History of dementia  Pharyngeal Dsyphagia    Electronically signed by Wendy Alvarenga DO on 2024 at 5:09 PM    
[facility]  INPATIENT SPEECH THERAPY  [facility]  PROGRESS NOTE    TIME   SLP Individual Minutes  Time In: 1330  Time Out: 1400  Minutes: 30  Timed Code Treatment Minutes: 30 Minutes       Date: 1/15/2024  Patient Name: Romario Marie      CSN: [id number]   : 1932  (89 y.o.)  Gender: male   Referring Physician:  Wendy Alvarenga DO  Diagnosis: subdural hematoma  Precautions: fall precautions  Current Diet: Regular solids. Thin liquids.  Respiratory Status: Room Air  Swallowing Strategies:  Full Upright Position, Small Bite/Sip, Alternate Solids and Liquids, Limit Distractions, and Monitor for Fatigue   Date of Last MBS/FEES: Not Applicable    Pain:  No pain reported.    Subjective:  Patient seen sitting upright in chair upon ST arrival with call light on requesting to use the restroom. ST with direct assistance with transfer to restroom taking approximately ten minutes; utilization for completion of cognitive therapy with consideration for safety problem solving. Pleasant, cooperative, and engaged with ST. Patient's wife, Tasneem, present at patient's chair side throughout session.     Short-Term Goals:  SHORT TERM GOAL #1:  Goal 1: Patient will safely consume a regular diet and thin liquids with implementation of compensatory strategies and stable pulmonary status to meet hydration/nutrition needs. GOAL NOT MET, CONTINUE  INTERVENTIONS:  DNT due to focus on additional STGs.    SHORT TERM GOAL #2:  Goal 2: Patient will complete functional carryover tasks (orientation with environmental aids, biographics, call light) with 50% accuracy with mod cues to improve awareness and participation in current environment. GOAL MET  Revised Goal 2: Patient will complete functional carryover tasks (orientation with environmental aids, biographics, call light) with 60% accuracy with mod cues to improve awareness and participation in current environment.  INTERVENTIONS:  DNT due to focus 
[facility]  Recreational Therapy  Discharge Note  Inpatient Rehabilitation Unit         Date:  1/23/2024       Patient Name: Romario Marie      MRN: [id number]       YOB: 1932 (89 y.o.)       Gender: male  Diagnosis: subdural hematoma  Referring Practitioner: ordering: Maria M Dacosta APRN - CNP, attending: Wendy Alvarenga DO    Patient discharged from Recreational Therapy at this time.  See recreational therapy notes for details.    Electronically signed by: Shakila Cardona, CTRS  Date: 1/23/2024   
[facility]  Inpatient Rehabilitation  Occupational Therapy  Progress Note  Time:  Time In: 0830  Time Out: 0900  Timed Code Treatment Minutes: 30 Minutes  Minutes: 30          Date: 1/15/2024  Patient Name: Romario Marie,   Gender: male      Room: [facility]06/006-A  MRN: [id number]  : 1932  (89 y.o.)  Referring Practitioner: ordering: Maria M Dacosta APRN - CNP, attending: Wendy Alvarenga DO  Diagnosis: subdural hematoma  Additional Pertinent Hx: Romario is an 89 year old male whom presented to [facility] on 24 as pt encountered a fall at home as he missed a step.  CT imaging indicated an acute subdural hemorrhage on the L side.  Hospital course was complicated by HTN, hypokalemia and hypomagnesemia. Pt. addmitted to the [facility] on 24 for further medical care and referred to skilled OT services at this time.    Restrictions/Precautions:  Restrictions/Precautions: General Precautions, Fall Risk  Position Activity Restriction  Other position/activity restrictions: per trauma note: \"Maintain systolic blood pressure between 100-160\", pt has baseline demenita, monitor bradycardia, Dr. Henry verbal order states ok for mobility with this if asymptomatic    SUBJECTIVE: Patient pleasant and cooperative. Agreeable to OT.    PAIN: Denies pain     Vitals: Vitals not assessed per clinical judgement, see nursing flowsheet    COGNITION: Decreased Recall, Decreased Insight, Impaired Memory, Decreased Problem Solving, Decreased Safety Awareness, and Impaired Attention    ADL:   Grooming: Contact Guard Assistance.  Standing x 5 min for oral care and shaving, x1 cue to use tools correctly demo'ing ideational apraxia.   Bathing: Contact Guard Assistance.  While standing, min cues for safety   Upper Extremity Dressing: with set-up.  Donning t-shirt  Lower Extremity Dressing: Contact Guard Assistance.  For standing balnace  Footwear Management: Minimal Assistance.  Pt able to doff and don slipper 
[facility]  Recreational Therapy  Daily Note  [facility]    Time Spent with Patient: 60 minutes    Date:  1/12/2024       Patient Name: Romario Marie      MRN: [id number]      YOB: 1932 (89 y.o.)       Gender: male  Diagnosis: subdural hematoma  Referring Practitioner: ordering: Maria M Dacosta APRN - CNP, attending: Wendy Alvarenga DO    RESTRICTIONS/PRECAUTIONS:  Restrictions/Precautions: General Precautions, Fall Risk     Hearing: Exceptions to WFL  Hearing Exceptions: Hard of hearing/hearing concerns    PAIN: 0    SUBJECTIVE:  we can go     OBJECTIVE:  Pt and his wife came to the west pod and played bingo with peers this afternoon-his wife had to help him but they both enjoyed getting out of his room and meeting peers-affect bright-he won a few games-appreciative     Social Interaction: 5 - Patient is appropriate with supervision/cues    Patient Education  New Education Provided: Importance of Leisure,     Electronically signed by: Shakila Cardona, CTRS  Date: 1/12/2024  
Patient confused in room, wanted to speak to wife. Called wife, she spoke to patient on the phone and redirected him. After phone call patient went back to bed. Resting quietly, call light in reach.  
Patient confused, trying to get out of bed. Wanted to speak to wife. Wife called and redirected patient. Patient in bed, alarm on, call light in reach.  
Patient discharged in stable condition as per order of attending physician to Home with Home Health Care per staff at Time: 11:40 and Via Wheelchair to car.    AVS provided by RN at time of discharge, which includes all necessary medical information pertaining to the patients current course of illness, treatment, medications, post-discharge goals of care, and treatment preferences.     Patient/ family verbalize understanding of discharge plan and are in agreement with goal/plan/treatment preferences.     Belongings including  all documented belongings  sent with patient.      Home medications sent home with patient N/A    Availability of \"My Chart\" offered to patient as a tool for updated health record.  Steps for activation discussed with patient as mentioned on AVS.      
Patient educated on how to use incentive spirometer. Patient verbalized understanding and demonstrated proper use. Emphasized importance and usage of device, with coughing and deep breathing every 2 hours while awake.         
Patient educated on how to use incentive spirometer. Patient verbalized understanding and demonstrated proper use. Emphasized importance and usage of device, with coughing and deep breathing every 4 hours while awake.        
Patient educated on how to use incentive spirometer. Patient verbalized understanding and demonstrated proper use. Emphasized importance and usage of device, with coughing and deep breathing every 4 hours while awake.     Pt cannot do this with much encouragement         
Patient: Romario Marie  Unit/Bed: 8K-06/006-A  YOB: 1932  MRN: [id number] Acct: [id number]   Admitting Diagnosis: Subdural hematoma (HCC) [S06.5XAA]  Admit Date:  1/11/2024  Hospital Day: 10    Assessment:     Principal Problem:    Subdural hematoma (HCC)  Resolved Problems:    * No resolved hospital problems. *      Plan:     Continue to follow.        Subjective:     Patient has no complaint of CP or SOB..   Medication side effects: none    Scheduled Meds:   magnesium oxide  400 mg Oral BID    polyethylene glycol  17 g Oral Daily    lisinopril  10 mg Oral Daily    And    hydroCHLOROthiazide  12.5 mg Oral Daily    atorvastatin  10 mg Oral Daily     Continuous Infusions:  PRN Meds:sodium phosphate 15 mmol in sodium chloride 0.9 % 250 mL IVPB, [DISCONTINUED] potassium chloride **OR** potassium chloride, polyethylene glycol, ondansetron, acetaminophen    Review of Systems  Pertinent items are noted in HPI.    Objective:     No data found.  I/O last 3 completed shifts:  In: 2030 [P.O.:2030]  Out: -   I/O this shift:  In: 360 [P.O.:360]  Out: -     /88   Pulse 77   Temp 98.5 °F (36.9 °C) (Oral)   Resp 16   Ht 1.753 m (5' 9\")   Wt 73.5 kg (162 lb)   SpO2 96%   BMI 23.92 kg/m²     General appearance: alert, appears stated age, and cooperative  Head: Normocephalic, without obvious abnormality, the ecchymosis and hematoma continue to slowly resolve.  Lungs: clear to auscultation bilaterally  Chest wall: no tenderness  Heart: regular rate and rhythm, S1, S2 normal, no murmur, click, rub or gallop  Abdomen: soft, non-tender; bowel sounds normal; no masses,  no organomegaly  Extremities: extremities normal, atraumatic, no cyanosis or edema  Skin: Skin color, texture, turgor normal. No rashes or lesions  Neurologic:  weak    Electronically signed by Donte Tolentino MD on 1/21/2024 at 6:28 PM                                
Patient: Romario Marie  Unit/Bed: 8K-06/006-A  YOB: 1932  MRN: [id number] Acct: [id number]   Admitting Diagnosis: Subdural hematoma (HCC) [S06.5XAA]  Admit Date:  1/11/2024  Hospital Day: 9    This patient was seen earlier in the day.    Assessment:     Principal Problem:    Subdural hematoma (HCC)  Resolved Problems:    * No resolved hospital problems. *      Plan:     Continue to follow        Subjective:     Patient has no complaint of CP or SOB..   Medication side effects: none    Scheduled Meds:   magnesium oxide  400 mg Oral BID    polyethylene glycol  17 g Oral Daily    lisinopril  10 mg Oral Daily    And    hydroCHLOROthiazide  12.5 mg Oral Daily    atorvastatin  10 mg Oral Daily     Continuous Infusions:  PRN Meds:sodium phosphate 15 mmol in sodium chloride 0.9 % 250 mL IVPB, [DISCONTINUED] potassium chloride **OR** potassium chloride, polyethylene glycol, ondansetron, acetaminophen    Review of Systems  Pertinent items are noted in HPI.    Objective:     No data found.  I/O last 3 completed shifts:  In: 2100 [P.O.:2100]  Out: -   No intake/output data recorded.    /70   Pulse 76   Temp 97.6 °F (36.4 °C) (Temporal)   Resp 18   Ht 1.753 m (5' 9\")   Wt 71.7 kg (158 lb)   SpO2 96%   BMI 23.33 kg/m²     General appearance: alert, appears stated age, and cooperative  Head: Normocephalic, without obvious abnormality, the hematoma and the ecchymoses continue to slowly resolve  Lungs: clear to auscultation bilaterally  Chest wall: no tenderness  Heart: regular rate and rhythm, S1, S2 normal, no murmur, click, rub or gallop  Abdomen: soft, non-tender; bowel sounds normal; no masses,  no organomegaly  Extremities: extremities normal, atraumatic, no cyanosis or edema  Skin: Skin color, texture, turgor normal. No rashes or lesions  Neurologic:  weak    Electronically signed by Donte Tolentino MD on 1/20/2024 at 8:59 PM                                
Patient: Romario Marie  Unit/Bed: 8K-06/006-A  YOB: 1932  MRN: [id number] Acct: [id number]   Admitting Diagnosis: Subdural hematoma (HCC) [S06.5XAA]  Admit Date:  1/11/2024  Hospital Day: 1    Assessment:     Principal Problem:    Subdural hematoma (HCC)  Resolved Problems:    * No resolved hospital problems. *      Plan:     No CS today will await tomorrow's.  Since the aricept may lower the sz threshold, I will let her PCP begin that and the namenda after discharge.        Subjective:     Patient has no complaint of CP or SOB..   Medication side effects: none    Scheduled Meds:   polyethylene glycol  17 g Oral Daily    lisinopril  10 mg Oral Daily    And    hydroCHLOROthiazide  12.5 mg Oral Daily    levETIRAcetam  500 mg Oral BID    atorvastatin  10 mg Oral Daily     Continuous Infusions:  PRN Meds:sodium phosphate 15 mmol in sodium chloride 0.9 % 250 mL IVPB, [DISCONTINUED] potassium chloride **OR** potassium chloride, polyethylene glycol, ondansetron, acetaminophen    Review of Systems  Pertinent items are noted in HPI.    Objective:     Patient Vitals for the past 8 hrs:   BP Temp Temp src Pulse Resp SpO2   01/12/24 1931 137/63 98.1 °F (36.7 °C) Oral 88 15 96 %     I/O last 3 completed shifts:  In: 1290 [P.O.:1290]  Out: -   No intake/output data recorded.    /63   Pulse 88   Temp 98.1 °F (36.7 °C) (Oral)   Resp 15   Ht 1.753 m (5' 9\")   Wt 80 kg (176 lb 5.9 oz)   SpO2 96%   BMI 26.05 kg/m²     General appearance: alert, appears stated age, and cooperative  Head: Normocephalic, without obvious abnormality, the ecchymosis and hematoma are unchanged  Lungs: clear to auscultation bilaterally  Chest wall: no tenderness  Heart: regular rate and rhythm, S1, S2 normal, no murmur, click, rub or gallop  Abdomen: soft, non-tender; bowel sounds normal; no masses,  no organomegaly  Extremities: extremities normal, atraumatic, no cyanosis or edema  Skin: Skin color, texture, turgor normal. No 
Patient: Romario Marie  Unit/Bed: 8K-06/006-A  YOB: 1932  MRN: [id number] Acct: [id number]   Admitting Diagnosis: Subdural hematoma (HCC) [S06.5XAA]  Admit Date:  1/11/2024  Hospital Day: 2    Assessment:     Principal Problem:    Subdural hematoma (HCC)  Resolved Problems:    * No resolved hospital problems. *      Plan:     The CS are acceptable and so will stop the checks.        Subjective:     Patient has no complaint of CP or SOB..   Medication side effects: none    Scheduled Meds:   polyethylene glycol  17 g Oral Daily    lisinopril  10 mg Oral Daily    And    hydroCHLOROthiazide  12.5 mg Oral Daily    levETIRAcetam  500 mg Oral BID    atorvastatin  10 mg Oral Daily     Continuous Infusions:  PRN Meds:sodium phosphate 15 mmol in sodium chloride 0.9 % 250 mL IVPB, [DISCONTINUED] potassium chloride **OR** potassium chloride, polyethylene glycol, ondansetron, acetaminophen    Review of Systems  Pertinent items are noted in HPI.    Objective:     Patient Vitals for the past 8 hrs:   BP Temp Temp src Pulse Resp SpO2   01/13/24 1946 120/66 98.4 °F (36.9 °C) Oral 76 15 94 %     I/O last 3 completed shifts:  In: 1720 [P.O.:1720]  Out: -   No intake/output data recorded.    /66   Pulse 76   Temp 98.4 °F (36.9 °C) (Oral)   Resp 15   Ht 1.753 m (5' 9\")   Wt 80 kg (176 lb 5.9 oz)   SpO2 94%   BMI 26.05 kg/m²     General appearance: alert, appears stated age, and cooperative  Head: the hematoma to the left upper lateral frontal area and the ecchymoses are slowly fading.  Lungs: clear to auscultation bilaterally  Heart: regular rate and rhythm, S1, S2 normal, no murmur, click, rub or gallop  Abdomen: soft, non-tender; bowel sounds normal; no masses,  no organomegaly  Extremities: extremities normal, atraumatic, no cyanosis or edema  Skin: Skin color, texture, turgor normal. No rashes or lesions  Neurologic:  weak    Data Review:    Latest Reference Range & Units 01/11/24 17:17 01/13/24 07:56 
Pt alert and oriented x4. Pupils equal and reactive to light. Mucous membrane moist and pink. Respirations easy and unlabored. No cough or SOB. Lung sounds clear. Heart sounds regular and strong amplitude. Bowel sounds active in all quadrants. Abdomen not tender. ROM and sensation present bilaterally in upper and lower extremities. Arms tremor during activity bilaterally. Skin turgor immediate. Hand grasp strong. Pedal and push bilaterally strong. No edema present. Pedal pulses present and strong. Faded old bruising noted on left forehead.  
RECREATIONAL THERAPY  [facility]      Date:  1/22/2024            Patient Name: Romario Marie           MRN: [id number]  Acct: [id number]          YOB: 1932 (89 y.o.)       Gender: male   Diagnosis: subdural hematoma  Physician: Referring Practitioner: ordering: Maria M Dacosta APRN - CNP, attending: Wendy Alvarenga DO    REASON FOR MISSED TREATMENT:  [place] peers-wife states they may come later this afternoon     Shakila Cardona, CTRS    1/22/2024      
RECREATIONAL THERAPY  [facility]      Date:  1/15/2024            Patient Name: Romario Marie           MRN: [id number]  Acct: [id number]          YOB: 1932 (89 y.o.)       Gender: male   Diagnosis: subdural hematoma  Physician: Referring Practitioner: ordering: Maria M Dacosta APRN - CNP, attending: Wendy Alvarenga DO    REASON FOR MISSED TREATMENT:  Declined eating [place] with peers today for lunch-very pleasant -wanted to eat in his room with his wife     Shakila Cardona, CTRS    1/15/2024      
Reviewed home medication with spouse and patient. Pt has baseline dementia and wife sets up medication. Spouse unsure of metformin dose. Spoke with [facility] pharmacy for clarification. Pharmacy stated patient takes metformin  mg 3 tablets at supper. Attending updated.   
[facility]  INPATIENT SPEECH THERAPY  [facility]  DAILY NOTE    TIME   SLP Individual Minutes  Time In: 0800  Time Out: 0830  Minutes: 30  Timed Code Treatment Minutes: 30 Minutes       Date: 2024  Patient Name: Romario Marie      CSN: [id number]   : 1932  (89 y.o.)  Gender: male   Referring Physician:  Wendy Alvarenga DO  Diagnosis: subdural hematoma  Precautions: fall precautions  Current Diet: Regular solids. Thin liquids.  Respiratory Status: Room Air  Swallowing Strategies:  Full Upright Position, Small Bite/Sip, Alternate Solids and Liquids, Limit Distractions, and Monitor for Fatigue   Date of Last MBS/FEES: Not Applicable    Pain:  No pain reported.    Subjective:  Patient sitting upright in recliner, alert and engaged throughout ST session. No family present.     Short-Term Goals:  SHORT TERM GOAL #1:  Goal 1: Patient will safely consume a regular diet and thin liquids with implementation of compensatory strategies and stable pulmonary status to meet hydration/nutrition needs.  INTERVENTIONS:  DNT due to focus on additional STGs.    SHORT TERM GOAL #2:  Goal 2:  Patient will complete functional carryover tasks (orientation with environmental aids, biographics, call light) with 60% accuracy with mod cues to improve awareness and participation in current environment.  INTERVENTIONS:    O-Lo/30  Independent: Name of hospital,   Logical cuing: Date, Day of the week, Time, Etiology  MC Cuing: Kind of place, Deficits   Unable: City, Month, Year  *Previous score of 10/30 on 2024  *Has not yet cleared PTA, making new learning abilities obsolete. Will continue to monitor each day to assess potential for new learning. Overall cognitive functioning further complicated by underlying dementia.       Following completion of O-Log, ST with DIRECT education re: utilization of external compensatory strategies (I.e., calendar, clock, care board etc.) Reviewed 
See ACP note.      01/12/24 1312   Encounter Summary   Encounter Overview/Reason  Advance Care Planning   Service Provided For: Patient;Patient and family together;Significant other   Referral/Consult From: Multi-disciplinary team   Support System Spouse;Family members   Last Encounter  01/12/24   Complexity of Encounter High   Begin Time 1230   End Time  1315   Total Time Calculated 45 min   Advance Care Planning   Type ACP conversation   Assessment/Intervention/Outcome   Assessment Coping   Intervention Active listening;Empowerment   Outcome Expressed Gratitude;Engaged in conversation       
[facility]  Occupational Therapy  Daily Note  Time:   Time In: 945  Time Out: 1045  Timed Code Treatment Minutes: 60 Minutes  Minutes: 60          Date: 2024  Patient Name: Romario Marie,   Gender: male      Room: [facility]06/006-A  MRN: [id number]  : 1932  (89 y.o.)  Referring Practitioner: ordering: Maria M Dacosta APRN - CNP, attending: Wendy Alvarenga DO  Diagnosis: subdural hematoma  Additional Pertinent Hx: Romario is an 89 year old male whom presented to [facility] on 24 as pt encountered a fall at home as he missed a step.  CT imaging indicated an acute subdural hemorrhage on the L side.  Hospital course was complicated by HTN, hypokalemia and hypomagnesemia. Pt. addmitted to the [facility] on 24 for further medical care and referred to skilled OT services at this time.    Restrictions/Precautions:  Restrictions/Precautions: General Precautions, Fall Risk  Position Activity Restriction  Other position/activity restrictions: per trauma note: \"Maintain systolic blood pressure between 100-160\", pt has baseline demenita, monitor bradycardia, Dr. Henry verbal order states ok for mobility with this if asymptomatic     SUBJECTIVE: Pt was up in recliner, spouse present, pleasant and cooperative.     PAIN: Denies pain    Vitals: Vitals not assessed per clinical judgement, see nursing flowsheet    COGNITION:  Decreased Insight, Decreased Problem Solving, Decreased Safety Awareness, and Impulsive     ADL:   Grooming: Stand By Assistance.  Standing sinkside to wash hands  Toileting: Stand By Assistance.  For voiding while standing. Pt able to manage clothing from standing level with no LOB    BALANCE:  Sitting Balance:  Supervision.    Standing Balance: Stand By Assistance. With 1-2 UE release. Pt stood fo periods >10min during session.     BED MOBILITY:  Not Tested    TRANSFERS:  Sit to Stand:  Stand By Assistance. Impulsive at 
Team conference held Tuesday, 1/23. Recommendations of the team were explained to the patient and his wife, Tasneem by ROBERT. Team is recommending that patient continue on acute inpatient rehab for PT, OT and ST, with expected discharge date of Tuesday, 1/23. Following discharge, team is recommending home health services for RN, PT, OT, ST and HHA. Care plan reviewed with patient and Tasneem.  Patient and Tasneem verbalized understanding of the plan of care and contributed to goal setting. SW to follow and maintain involvement in discharge planning.   
This Pre Admission Screen is a refiled document from the acute stay. The Pre Admission was completed and signed on 2024 at 9:58 by Dr. Wendy Alvarenga.      [facility]  Acute Inpatient Rehab Preadmission Assessment     Patient Name: Romario Marie        Ethnicity:Not of , /a, or [place]er origin  Race:White  MRN:   [id number]    : 1932  (89 y.o.)  Gender: male      Admitted from:[facility]  Initial Assessment     Date of admission to the hospital: 2024 12:50 PM  Date patient eligible for admission:2024     Primary Diagnosis: Subdural hematoma      Did patient have surgery?  no     Physicians: Noah Schwab MD, Dr. Alvarenga, Dr. Rhodes      Risk for clinical complications/co-morbidities:   Past Medical History        Past Medical History:   Diagnosis Date    Diabetes mellitus (HCC)      Essential hypertension      H/O prostate cancer      Hyperlipidemia       Patient denies any diagnoses but is on medication    Paroxysmal atrial fibrillation (HCC)       Prior to surgery            Financial Information  Primary insurance:  [employer]     Secondary Insurance:   None     Has the patient had two or more falls in the past year or any fall with injury in the past year?   yes     Did the patient have major surgery during the 100 days prior to admission?  no     Precautions:      Restrictions/Precautions: General Precautions, Fall Risk  Other position/activity restrictions: hx of dementia, Keep systolic blood pressure less than 160 and above 100       Isolation Precautions: None                  Physiatrist:  Dr. Alvarenga     Patients Occupation: Retired     Reviewed Lab and Diagnostic reports from Current Admission: Yes     Patients Prior Functional  Level: Prior Function  ADL Assistance: Independent  Homemaking Assistance: Needs assistance  Ambulation Assistance: Independent  Transfer Assistance: Independent  Additional Comments: IND with use of SC in and 
[facility]  Occupational Therapy  Daily Note  Time:   Time In: 0900  Time Out: 0930  Timed Code Treatment Minutes: 30 Minutes  Minutes: 30    Date: 1/15/2024  Patient Name: Romario Marie,   Gender: male      Room: [facility]06/006-A  MRN: [id number]  : 1932  (89 y.o.)  Referring Practitioner: ordering: Maria M Dacosta APRN - CNP, attending: Wendy Alvarenga DO  Diagnosis: subdural hematoma  Additional Pertinent Hx: Romario is an 89 year old male whom presented to [facility] on 24 as pt encountered a fall at home as he missed a step.  CT imaging indicated an acute subdural hemorrhage on the L side.  Hospital course was complicated by HTN, hypokalemia and hypomagnesemia. Pt. addmitted to the [facility] on 24 for further medical care and referred to skilled OT services at this time.    Restrictions/Precautions:  Restrictions/Precautions: General Precautions, Fall Risk  Position Activity Restriction  Other position/activity restrictions: per trauma note: \"Maintain systolic blood pressure between 100-160\", pt has baseline demenita, monitor bradycardia, Dr. Henry verbal order states ok for mobility with this if asymptomatic     SUBJECTIVE: Patient pleasant and cooperative. Agreeable to OT.     PAIN: Denies pain     Vitals: Vitals not assessed per clinical judgement, see nursing flowsheet    COGNITION: Slow Processing, Impaired Memory, Inattention, Decreased Problem Solving, Impaired Attention, and Difficulty Following Commands    ADL:   No ADL's completed this session..    BALANCE:  Sitting Balance:  Modified Independent.    Standing Balance: Contact Guard Assistance.      BED MOBILITY:  Not Tested    TRANSFERS:  Sit to Stand:  Contact Guard Assistance. Recliner, standard cahir   Stand to Sit: Contact Guard Assistance.      FUNCTIONAL MOBILITY:  Assistive Device: Rolling Walker  Assist Level:  Contact Guard Assistance.   Distance:  household 
[facility]  INPATIENT SPEECH THERAPY  [facility]  DAILY NOTE    TIME   SLP Individual Minutes  Time In: 0800  Time Out: 0830  Minutes: 30  Timed Code Treatment Minutes: 0 Minutes   Speech/Language tx: 30 minutes    Date: 2024  Patient Name: Romario Marie      CSN: [id number]   : 1932  (89 y.o.)  Gender: male   Referring Physician:  Wendy Alvarenga DO  Diagnosis: subdural hematoma  Precautions: fall precautions  Current Diet: Regular solids. Thin liquids.  Respiratory Status: Room Air  Swallowing Strategies:  Full Upright Position, Small Bite/Sip, Alternate Solids and Liquids, Limit Distractions, and Monitor for Fatigue   Date of Last MBS/FEES: Not Applicable    Pain:  No pain reported.    Subjective:  Patient sitting in recliner upon ST arrival. Agreeable to skilled ST services. No family present. Pleasant and cooperative throughout.     Short-Term Goals:  SHORT TERM GOAL #1:  Goal 1: Patient will safely consume a regular diet and thin liquids with implementation of compensatory strategies and stable pulmonary status to meet hydration/nutrition needs.  INTERVENTIONS: Did not address d/t focus on other goals      SHORT TERM GOAL #2:  Goal 2:  Patient will complete functional carryover tasks (orientation with environmental aids, biographics, call light) with 60% accuracy with mod cues to improve awareness and participation in current environment.  INTERVENTIONS:  Did not address d/t focus on other goals      SHORT TERM GOAL #3:  Goal 3: Patient will complete basic safety awareness, problem solving, verbal/visual reasoning (hospital, home-related), and sequencing tasks with 50% accuracy with mod cues to improve contributions within ADLs.   INTERVENTIONS:  Did not address d/t focus on other goals      SHORT TERM GOAL #4:  Goal 4: Patient will complete receptive langauge tasks (mildly complex yes/no questions, following 2-step commands, functional reading/verbal 
[facility]  INPATIENT SPEECH THERAPY  [facility]  DAILY NOTE    TIME   SLP Individual Minutes  Time In: 0830  Time Out: 0900  Minutes: 30  Timed Code Treatment Minutes: 30 Minutes       Date: 2024  Patient Name: Romario Marie      CSN: [id number]   : 1932  (89 y.o.)  Gender: male   Referring Physician:  Wendy Alvarenga DO  Diagnosis: subdural hematoma  Precautions: fall precautions  Current Diet: Regular solids. Thin liquids.  Respiratory Status: Room Air  Swallowing Strategies:  Full Upright Position, Small Bite/Sip, Alternate Solids and Liquids, Limit Distractions, and Monitor for Fatigue   Date of Last MBS/FEES: Not Applicable    Pain:  No pain reported.    Subjective:  Patient positioned in recliner for duration of session. Alert and engaged in session, but with notable anomia relying heavily on gestures and circumlocution to convey information. Wife, Tasneem, present towards the end of the session.     Short-Term Goals:  SHORT TERM GOAL #1:  Goal 1: Patient will safely consume a regular diet and thin liquids with implementation of compensatory strategies and stable pulmonary status to meet hydration/nutrition needs.  INTERVENTIONS:  DNT due to focus on additional STGs.    SHORT TERM GOAL #2:  Goal 2:  Patient will complete functional carryover tasks (orientation with environmental aids, biographics, call light) with 60% accuracy with mod cues to improve awareness and participation in current environment.  INTERVENTIONS:    O-Log: 10/30  Independent: Name of hospital, Etiology   MC Cuing: City, Kind of place, Clock time, pathology deficits  Unable: Month, date, year, day of the week  *Previous score of 12/30 on 1/15/2024      Following completion of O-Log, ST with DIRECT education re: utilization of external compensatory strategies (I.e., calendar, clock, etc.) Reviewed all information. Min cues needed to correctly navigate calendar. Patient was able to 
[facility]  Recreational Therapy  Inpatient Rehabilitation Evaluation        Time Spent with Patient: 25 minutes    Date:  1/12/2024       Patient Name: Romario Marie      MRN: [id number]       YOB: 1932 (89 y.o.)       Gender: male  Diagnosis: subdural hematoma  Referring Practitioner: ordering: Maria M Dacosta APRN - CNP, attending: Wendy Alvarenga DO    RESTRICTIONS/PRECAUTIONS:  Restrictions/Precautions: General Precautions, Fall Risk     Hearing: Exceptions to WFL  Hearing Exceptions: Hard of hearing/hearing concerns    PAIN: 0    SUBJECTIVE:  pt lives with wife Tasneem-they have a son close by (in a group home with autism) that they see regularly and a daughter in [place]    VISION:  Visual Impairment-R eye decreased    HEARING: Hard of Hearing    LEISURE INTERESTS:   Pt has a history of dementia-pt was born in [place], lived in [place] and came to the [place]-has lived in several states throughout the years and he and wife met in [place]-they were both in the theatre and met doing a play \"[place]\"-pt retired from engineering, he use to read a lot and enjoyed working crossword puzzles-they both love music and enjoy going to the [employer] and [employer] band performances-they do not travel anymore-encouraged wife to take pt off unit in free time and gave her a schedule of musical events in our lobby during the week-gave pt a deck of cards for he and wife to use in free time-pt very pleasant     BARRIERS TO LEISURE INTERESTS:    Confusion, Decreased endurance, and Impaired vision           Patient Education  New Education Provided: Importance of Leisure, RT Plan of Care    Plan:  Continue to follow patient through this admission  Include patient in appropriate groups  See patient individually    Electronically signed by: Shakila Cardona, CTRS  Date: 1/12/2024  
[facility]  Occupational Therapy  Daily Note  Time:    Time In: 731  Time Out: 831  Timed Code Treatment Minutes: 60 Minutes  Minutes: 60          Date: 2024  Patient Name: Romario Marie,   Gender: male      Room: [facility]06/006-A  MRN: [id number]  : 1932  (89 y.o.)  Referring Practitioner: ordering: Maria M Dacosta APRN - CNP, attending: Wendy Alvarenga DO  Diagnosis: subdural hematoma  Additional Pertinent Hx: Romario is an 89 year old male whom presented to [facility] on 24 as pt encountered a fall at home as he missed a step.  CT imaging indicated an acute subdural hemorrhage on the L side.  Hospital course was complicated by HTN, hypokalemia and hypomagnesemia. Pt. addmitted to the [facility] on 24 for further medical care and referred to skilled OT services at this time.    Restrictions/Precautions:  Restrictions/Precautions: General Precautions, Fall Risk  Position Activity Restriction  Other position/activity restrictions: per trauma note: \"Maintain systolic blood pressure between 100-160\", pt has baseline demenita, monitor bradycardia, Dr. Henry verbal order states ok for mobility with this if asymptomatic      SUBJECTIVE: Pt. Seated in bedside chair and agreeable to OT session.  Pt. Pleasant throughout.     PAIN: 0/10: as pt denies    Vitals: Vitals not assessed per clinical judgement, see nursing flowsheet    COGNITION: Decreased Insight, Decreased Safety Awareness, and Impaired Attention    ADL:   No ADL's completed this session..    BALANCE:  Sitting Balance:  Modified Independent.    Standing Balance: Stand By Assistance, Contact Guard Assistance. varied    BED MOBILITY:  Not Tested    TRANSFERS:  Sit to Stand:  SBA- CGA  Stand to Sit:  SBA-CGA  Pt. Required cues for safety, pt completed more than trial throughout session.       FUNCTIONAL MOBILITY:  Assistive Device: Straight Cane  Assist Level:  Stand By 
[facility]  Occupational Therapy  Daily Note  Time:   Time In: 1030  Time Out: 1130  Timed Code Treatment Minutes: 60 Minutes  Minutes: 60          Date: 2024  Patient Name: Romario Marie,   Gender: male      Room: [facility]06/006-A  MRN: [id number]  : 1932  (89 y.o.)  Referring Practitioner: ordering: Maria M Dacosta APRN - CNP, attending: Wendy Alvarenga DO  Diagnosis: subdural hematoma  Additional Pertinent Hx: Romario is an 89 year old male whom presented to [facility] on 24 as pt encountered a fall at home as he missed a step.  CT imaging indicated an acute subdural hemorrhage on the L side.  Hospital course was complicated by HTN, hypokalemia and hypomagnesemia. Pt. addmitted to the [facility] on 24 for further medical care and referred to skilled OT services at this time.    Restrictions/Precautions:  Restrictions/Precautions: General Precautions, Fall Risk  Position Activity Restriction  Other position/activity restrictions: per trauma note: \"Maintain systolic blood pressure between 100-160\", pt has baseline demenita     SUBJECTIVE: Pt sitting in bedside chair upon arrival and wife present throughout session. Pt pleasant and agreeable to OT session.    PAIN: no c/o    Vitals: Oxygen: >96% RA  Heart Rate:  BPM with activity    COGNITION: Slow Processing, Decreased Recall, Decreased Insight, Decreased Problem Solving, Decreased Safety Awareness, and Difficulty Following Commands    ADL:   No ADL's completed this session..    BALANCE:  Sitting Balance:  Supervision. Seated in w/c  Standing Balance: Contact Guard Assistance. Pt tolerated > 7 min standing with1-2 UE release during IADL simple cooking task.    BED MOBILITY:  Not Tested    TRANSFERS:  Sit to Stand:  Contact Guard Assistance.    Stand to Sit: Minimal Assistance. Pt required mod vc to slowly descend into w/c however demo'ed poor carryover throughout session.  
     Latest Reference Range & Units 01/14/24 14:46   Magnesium 1.6 - 2.4 mg/dL 1.6     Electronically signed by Donte Tolentino MD on 1/14/2024 at 4:41 PM                                
typically.    Restrictions/Precautions:  Restrictions/Precautions: General Precautions, Fall Risk  Position Activity Restriction  Other position/activity restrictions: per trauma note: \"Maintain systolic blood pressure between 100-160\", pt has baseline demenita, monitor bradycardia, Dr. Henry verbal order states ok for mobility with this if asymptomatic     SUBJECTIVE: Patient in room in recliner, agreeable to PT.  Pt's spouse present throughout most of session and supportive.     PAIN: bilateral knees, not rated    Vitals: Vitals not assessed per clinical judgement, see nursing flowsheet    OBJECTIVE:  Bed Mobility:  Not Tested    Transfers:  Sit to Stand: Stand By Assistance, Contact Guard Assistance  Stand to Sit:Stand By Assistance, Contact Guard Assistance --occasional verbal cues for hand placement with use of RW  Car:Contact Guard Assistance --verbal cues for positioning    Ambulation:  Contact Guard Assistance, min assist one time with right loss of balance and pt catching self with wall  Distance: 125', 60'x2  Surface: Level Tile  Device:Cane  Gait Deviations:  Forward Flexed Posture, Slow Nathaly, Decreased Step Length Bilaterally, Decreased Gait Speed, Decreased Heel Strike Bilaterally, Mild Path Deviations, and Moderate Path Deviations  *Verbal cues for step height    Contact Guard Assistance  Distance: 224', ~50'x2 weaving in and out of cones, 30'x2  Surface: Level Tile  Device:Rolling Walker  Gait Deviations:  Forward Flexed Posture, Slow Nathaly, Decreased Step Length Bilaterally, Decreased Gait Speed, Decreased Heel Strike Bilaterally, and Mild Path Deviations   *Verbal cues to ambulate within frame of RW, verbal cues for upright posture.  Pt only requiring cues for positioning at start of ambulation, then good carryover while ambulating.   *Verbal cues to back RW up with him to chair versus abandoning RW--fair carryover  *Spouse checking to see if walker at home is standard or RW  *Overall pt 
  6\" steps X 4 steps using One Handrail and Supervision, X 1.    Platform: 6\" platform X 1 porch step by apartment  using One Handrail and Supervision, X 1.    Balance:  Static Sitting Balance:  Modified Independent  *sitting at EOB and on edge of recliner with no back support  Dynamic Sitting Balance: Modified Independent  *sitting at edge of recliner donning shoes prior to gait training   Static Standing Balance: Modified Independent  *standing beside EOB and recliner prior to gait training to ensure upright standing balance   Pt. Able to  an object from floor using No Device with Modified Independent    Neuromuscular Re-education  None    Exercise:  None    Functional Outcome Measures:   Not completed  Modified [place] Scale:  +3 - Moderate disability; requiring some help, but able to walk without assistance.   Education provided regarding stroke rehabilitation management.    ASSESSMENT:  Assessment: Patient progressing toward established goals.  PT ASSESSMENT:  At discharge Mr Marie met 6/6 STG's and 5/7 LTG's.  Patient did not meet LTG for gait with no AD as he uses cane or RW for increased distances and did not meet LTG for the MAYES balance test, although did improve score from 27/56 at initial evaluation to 35/56.  Since initial evaluation, patient has progressed bed mobility from SBA to modified independence, sit < > stand transfers from CGA to modified independence, and gait from CGA with a cane to modified independence short distances with a cane and supervision longer stances with a RW or cane.  Patient continues to require verbal cues for safety due to cognitive impairments.  Mr Marie is returning to home with his spouse and transitioning to home health.  Recommend 24 hour supervision.  Activity Tolerance:  Patient tolerance of  treatment: good.     Equipment Recommendations:Equipment Needed: No    Plan: Pt. to be discharged home with his wife and with home health PT    Patient Education  Patient 
ADL routines.  Short Term Goal 2: Pt. to navigate to/from bathroom with LRAD with SBA and min verbal cues for navigation to enhance engagement in bathroom related ADLs.  Short Term Goal 3: Pt. to follow 3 step minimum recipe with SBA and min vc for cognitive deficts to maximize performance with IADLs.  Long Term Goals  Time Frame for Long Term Goals : 2 weeks from IPR OT eval  Long Term Goal 1: Pt. to complete self care regimen (bathing, dressing, grooming, toileting) with set up assistance to progress to PLOF.  Long Term Goal 2: Pt. to engage in simple homemaking tasks with Supervision to promote engagement in meaningful routines.  Long Term Goal 3: Pt. to demo Mod I to transfer to/from toilet with 0 cues to improve perfromance with ADL transfers.    Following session, patient left in safe position with all fall risk precautions in place.      
Guidelines:  Limit the number of visitors in the room (no more than two at a time). Speak one at a time. Keep visits short.    Maintain quiet, dark room and keep the door closed.  Remove personal electronic devices and cell phone(s).   No television, movies, electronic games.  Consider the amount of visual stimulation in the room (number of pictures, banners, balloons etc).    Encourage and allow frequent rest periods.     Verbal receptiveness noted with ongoing education to be provided.     Long-Term Goals:  Time Frame for Long Term Goals: 3 weeks    LONG TERM GOAL #1:  Goal 1: Patient will safely consume a regular diet and thin liquids with implementation of compensatory strategies and stable pulmonary status to meet hydration/nutrition needs.     LONG TERM GOAL #2:  Goal 2: Patient will improve cognitive function to a level of moderate assist for potential return to PLOF.    LONG TERM GOAL #3:  Goal 3: Patient will improve receptive and expressive langauge to a level of supervision to improve every day communication.     Comprehension: 3 - Patient understands basic needs 50-74% of the time  Expression: 3 - Expresses basic ideas/needs 50-74% of the time  Social Interaction: 5 - Patient is appropriate with supervision/cues  Problem Solvin - Patient solves simple/routine tasks < 25%  Memory: 1 - Patient remembers < 25% of the time    Functional Oral Intake Scale: Total Oral Intake: 7.  Total oral intake with no restrictions    EDUCATION:  Learner: Patient and Significant Other  Education:  Reviewed ST goals and Plan of Care, Reviewed recommendations for follow-up, Education Related to Potential Risks and Complications Due to Impairment/Illness/Injury, and Education Related to Health Promotion and Wellness  Evaluation of Education: Demonstrates with assistance, Needs further instruction, and No evidence of learning    ASSESSMENT/PLAN:  Activity Tolerance:  Patient tolerance of  treatment: fair. Great attempts, 
Term Goals  Time Frame for Short Term Goals: 1 week  Short Term Goal 1: Goal met  Short Term Goal 2: Goal met  Short Term Goal 3: Pt. to follow 3 step minimum recipe with SBA and min vc for cognitive deficts to maximize performance with IADLs. GOAL NOT  MET  Long Term Goals  Time Frame for Long Term Goals : 2 weeks from IPR OT eval  Long Term Goal 1: Pt. to complete self care regimen (bathing, dressing, grooming, toileting) with set up assistance to progress to PLOF.  GOAL NOT FULLY MET  Long Term Goal 2: Pt. to engage in simple homemaking tasks with Supervision to promote engagement in meaningful routines.  GOAL NOT  MET  Long Term Goal 3: Pt. to demo Mod I to transfer to/from toilet with 0 cues to improve perfromance with ADL transfers. GOAL MET    Following session, patient left in safe position with all fall risk precautions in place.   
at home  Supra therapeutic INR on admission (10)  Hematoma left temple   Paroxysmal atrial fibrillation  HTN  HLD  NIDDMII  History of prostate cancer  History of dementia  Pharyngeal Dsyphagia    Recommendations:  Continue to participate in an inpatient rehabilitation program involving at least 3 hours per day, 5 days per week of intensive rehabilitation.  Rehabilitation services will include PT, OT, and SLP/RT in order to improve functional status prior to discharge.  Family education and training will be completed.  Equipment evaluations and recommendations will be completed as appropriate.       Rehabilitation nursing will be involved for bowel, bladder, skin, and pain management.  Nursing will also provide education and training to patient and family.    Dr. Mcarthur consulted for medical management   SDH: 2/2 mechanical fall down stairs. Managed non-operatively. Continue Keppra 500 mg BID x 1 week per Neurosurgery recommendations (1/14)  CTH stable on 1/11/2024. Plan for repeat head CT in 1 week (1/18). Outpatient follow up with Neurosurgery. Continue to hold warfarin  Paroxysmal A-fib: Previously on warfarin.  Warfarin is currently on hold per neurosurgery due to subdural hematoma.  Per DC summary, consider risk vs. Benefit of Warfarin given fall risk, dementia, and concern for double dosing . Plan is outpatient follow up with cardiology for evaluation for watchman  Cardiology consulted due to bradycardia- per Cardiology not a candidate for anticoagulation, okay to participate in therapies. Avoid AV blocking agents. Consider Holter monitor on DC.  HTN: Continue lisinopril 10 mg daily and hydrochlorothiazide 12.5 mg daily  HLD: Continue atorvastatin 10 mg daily  T2DM:  SSI Insulin DC by Dr. Mcarthur  Prophylaxis:  DVT: SCDs.   Pain: As needed Tylenol  Nutrition:  Consultation to dietician for nutritional counseling and recommendations.      Bladder: Will monitor for signs and symptoms of 
hand release SBA to improve performance with ADL routines.  Short Term Goal 2: Pt. to navigate to/from bathroom with LRAD with SBA and min verbal cues for navigation to enhance engagement in bathroom related ADLs.  Short Term Goal 3: Pt. to follow 3 step minimum recipe with SBA and min vc for cognitive deficts to maximize performance with IADLs.  Long Term Goals  Time Frame for Long Term Goals : 2 weeks from IPR OT eval  Long Term Goal 1: Pt. to complete self care regimen (bathing, dressing, grooming, toileting) with set up assistance to progress to PLOF.  Long Term Goal 2: Pt. to engage in simple homemaking tasks with Supervision to promote engagement in meaningful routines.  Long Term Goal 3: Pt. to demo Mod I to transfer to/from toilet with 0 cues to improve perfromance with ADL transfers.    Following session, patient left in safe position with all fall risk precautions in place.       
over hurdles while in the parallel bars to improve BLE foot clearance    Stairs:  Stairs: 6\" steps X 8 steps, non reciprocal, using One Handrail and a Cane and Contact Guard Assistance, with verbal cues to step his entire foot on the step.    Balance:  Static Standing Balance: Contact Guard Assistance  Dynamic Standing Balance: Contact Guard Assistance    Neuromuscular Re-education  None    Exercise:  Patient was guided in 1 set(s) 10-15 reps of exercises: Seated heel/toe raises, Long arc quads, Standing marches, Standing hip abduction/adduction, Standing hip extension, and Standing hip flexion. Patient also performed side stepping in the parallel bars, down and back x 2.  Exercises were completed for increased independence with functional mobility.  **Pt required min tactile/verbal cues to perform proper movement with proper form     Functional Outcome Measures:   Not completed  Modified [place] Scale:  +4 - Moderately severe disability; unable to walk and attend to bodily needs without assistance.   Education provided regarding stroke rehabilitation management.     ASSESSMENT:  Assessment: Patient progressing toward established goals.  Activity Tolerance:  Patient tolerance of  treatment: good.     Equipment Recommendations:Equipment Needed: No  Discharge Recommendations: Continue to assess pending progress, 24 hour supervision or assist     PLAN: Current Treatment Recommendations: Strengthening, Balance training, Functional mobility training, Transfer training, Neuromuscular re-education, Stair training, Gait training, Endurance training, Home exercise program, Safety education & training, Equipment evaluation, education, & procurement, Patient/Caregiver education & training, Therapeutic activities  General Plan:  (5x/wk 90min)    Patient Education  Patient Education: Plan of Care, Functional Mobility, Reviewed Prior Education, Health Promotion and Wellness Education, Safety, Verbal Exercise Instruction, 
01/23/2024    Destination: discharge home with supervision  Services at Discharge: Home Health  Physical Therapy, Occupational Therapy, Speech Therapy, Nursing, and nursing aide 3x week  Is patient appropriate for an outpatient driving evaluation? No- not appropriate to drive  Equipment at Discharge: Other: None at this time  Factors facilitating achievement of predicted outcomes: Family support, Cooperative, and Pleasant  Barriers to the achievement of predicted outcomes: Cognitive deficit, Limited insight into deficits, and Decreased endurance  Follow up with physiatrist? yes, 6-8 weeks following discharge    Team Members Present at Conference:  :DORY Lawrence  Occupational Therapist:Apple Glaser OTR/L [id number]  Physical Therapist:Hannah Crespo, [place] [id number]  Speech Therapist:Jose Bernstein MS, CCC-SLP [id number]  Nurse:Lacy Espinosa RN  Psychologist: Theresa Cormier, PhD    I approve the established interdisciplinary plan of care as documented within the medical record of Romario Marie. I was present and led the interdisciplinary care conference.    Wendy Alvarenga DO  Electronically signed by Wendy Alvarenga DO on 1/23/2024 at 10:20 AM         
1: Patient will safely consume a regular diet and thin liquids with implementation of compensatory strategies and stable pulmonary status to meet hydration/nutrition needs. GOAL MET, NO NEW GOAL    LONG TERM GOAL #2:  Goal 2: Patient will improve cognitive function to a level of moderate assist for potential return to PLOF. GOAL NOT MET, CONTINUE    LONG TERM GOAL #3:  Goal 3: Patient will improve receptive and expressive langauge to a level of supervision to improve every day communication. GOAL NOT MET, CONTINUE    Comprehension: 3 - Patient understands basic needs 50-74% of the time  Expression: 3 - Expresses basic ideas/needs 50-74% of the time  Social Interaction: 5 - Patient is appropriate with supervision/cues  Problem Solvin - Patient solves simple/routine tasks < 25%  Memory: 1 - Patient remembers < 25% of the time    Functional Oral Intake Scale: Total Oral Intake: 7.  Total oral intake with no restrictions    EDUCATION:  Learner: Patient and Significant Other  Education:  Reviewed ST goals and Plan of Care, Reviewed recommendations for follow-up, and Education Related to Potential Risks and Complications Due to Impairment/Illness/Injury  Evaluation of Education: Demonstrates with assistance, Needs further instruction, and No evidence of learning    ASSESSMENT/PLAN:  Summary: Patient achieved 4/6 STGs and 1/3 LTGs this therapy reporting period. Patient with demonstrated gains in BASIC orientation with utilization of visual aids. Patient continues to present with deficits in orientation with compensatory strategy implementation, problem solving, safety, recall with and without compensatory memory strategies, and overall insight into deficits. Patient expressive and receptive language with continued impairment, however, improvement noted in receptive language tasks (I.e., yes/no questions) and confrontational naming. Patient speech and voice WFL. Patient currently safely consuming regular diet with thin 
care.  Plan for Next Session: O-log, Cog-log, ACE, BASIC problem solving and safety   Discharge Recommendations: Home with Home Exercise Program, Home Health, and Continue to Assess Pending Progress       Alexandra Rios M.S., CCC-SLP [id number]    
evaluation alone.    SIGNS AND SYMPTOMS OF LARYNGEAL PENETRATION / ASPIRATION:  No signs/symptoms of aspiration evident in this evaluation, but cannot rule out silent aspiration.    INSTRUMENTAL EVALUATION: Instrumental evaluation not indicated at this time.    DIET RECOMMENDATIONS:  Regular diet and thin liquids    STRATEGIES: Full Upright Position, Small Bite/Sip, Alternate Solids and Liquids, Limit Distractions, and Monitor for Fatigue     FUNCTIONAL ORAL INTAKE SCALE: Total Oral Intake: 7.  Total oral intake with no restrictions    Comprehension: 3 - Patient understands basic needs 50-74% of the time  Expression: 2 - Expresses basic ideas/needs 25-49% of the time  Social Interaction: 5 - Patient is appropriate with supervision/cues  Problem Solvin - Patient solves simple/routine tasks < 25%  Memory: 1 - Patient remembers < 25% of the time    RECOMMENDATIONS/ASSESSMENT:  DIAGNOSTIC IMPRESSIONS:  Clinical Swallow Evaluation: Patient presents with intact oral phase with inability to fully discern potential presence of pharyngeal phase deficits without formal instrumentation. Patient with adequate bolus control/manipulation and timely rotary mastication with effective textural breakdown. No oral residue noted. No overt signs/symptoms of aspiration across PO trials. Of course, pharyngeal dysfunction and totality of airway invasions cannot be discerned at bedside alone, however, instrumental evaluation not indicated at this time. Patient noted to be impulsive with thin liquids this date.     Recommend patient continue regular diet and thin liquids with stable pulmonary status and implementation of compensatory strategies. ST to follow for x1-2 dietary monitors.    Speech, Language, Cognitive Evaluation: Patient presents with a severe cognitive impairment characterized by the clinical findings outlined above. Patient with hx of dementia, ?baseline cognitive function. Patient with moderate-severe 
mobility, ADL strategies, HEP     Goals  Short Term Goals  Time Frame for Short Term Goals: 1 week  Short Term Goal 1: Pt. to demo improved standing tolerance to 5 min 1-2 hand release SBA to improve performance with ADL routines.-goal met, discontinue    Short Term Goal 2: Pt. to navigate to/from bathroom with LRAD with SBA and min verbal cues for navigation to enhance engagement in bathroom related ADLs.-goal met, discontinue    Short Term Goal 3: Pt. to follow 3 step minimum recipe with SBA and min vc for cognitive deficts to maximize performance with IADLs.-goal not met, continue   Long Term Goals  Time Frame for Long Term Goals : 2 weeks from IPR OT eval  Long Term Goal 1: Pt. to complete self care regimen (bathing, dressing, grooming, toileting) with set up assistance to progress to PLOF.-goal not met, continue   Long Term Goal 2: Pt. to engage in simple homemaking tasks with Supervision to promote engagement in meaningful routines.-goal not met, continue   Long Term Goal 3: Pt. to demo Mod I to transfer to/from toilet with 0 cues to improve perfromance with ADL transfers.-goal not met, continue     Revised Short-Term Goals  Short Term Goals  Time Frame for Short Term Goals: 1 week  Short Term Goal 1: Goal met  Short Term Goal 2: Goal met  Short Term Goal 3: Pt. to follow 3 step minimum recipe with SBA and min vc for cognitive deficts to maximize performance with IADLs.  Long Term Goals  Time Frame for Long Term Goals : 2 weeks from IPR OT eval  Long Term Goal 1: Pt. to complete self care regimen (bathing, dressing, grooming, toileting) with set up assistance to progress to PLOF.  Long Term Goal 2: Pt. to engage in simple homemaking tasks with Supervision to promote engagement in meaningful routines.  Long Term Goal 3: Pt. to demo Mod I to transfer to/from toilet with 0 cues to improve perfromance with ADL transfers.      Following session, patient left in safe position with all fall risk precautions in 
warfarin  GASTROINTESTINAL:  negative for nausea, vomiting, and constipation  GENITOURINARY:  negative  SKIN:  bruising  HEMATOLOGIC/LYMPHATIC:  negative  MUSCULOSKELETAL:  positive for  muscle weakness  NEUROLOGICAL:  positive for memory problems, gait problems, and weakness  BEHAVIOR/PSYCH:  negative  System review otherwise negative    Physical Exam:  /86   Pulse 70   Temp 98.2 °F (36.8 °C) (Oral)   Resp 17   Ht 1.753 m (5' 9\")   Wt 71.7 kg (158 lb)   SpO2 96%   BMI 23.33 kg/m²   Patient is awake and alert, sitting up in bed with nursing at bedside   Orientation:  oriented to person only. When given choices he identifies that he is in [place], unable to identify the month even with cues   Mood: within normal limits  Affect: calm  General appearance: Patient is well nourished, well developed, well groomed and in no acute distress    Memory:   abnormal - deficits noted with conversation, not formally testing.    Attention/Concentration: normal  Language:  normal    Cranial Nerves:  no obvious deficits noted  ROM:  normal  Motor: 4/5 throughout BUE and BLE  Tone:  normal  Muscle bulk: within normal limits  Sensory:  Sensation intact to light touch     Heart: RRR, no m/r/g noted   Lungs: CTAB, no increased WOB on room air  Skin: warm and dry, no rash or erythema. Hematoma on scalp noted on left, ecchymosis around left eye and through left face noted.       Diagnostics:  Recent Results (from the past 24 hour(s))   Magnesium    Collection Time: 01/14/24  2:46 PM   Result Value Ref Range    Magnesium 1.6 1.6 - 2.4 mg/dL   Comprehensive Metabolic Panel    Collection Time: 01/15/24  6:25 AM   Result Value Ref Range    Glucose 143 (H) 70 - 108 mg/dL    Creatinine 0.7 0.4 - 1.2 mg/dL    BUN 16 7 - 22 mg/dL    Sodium 136 135 - 145 meq/L    Potassium 3.9 3.5 - 5.2 meq/L    Chloride 102 98 - 111 meq/L    CO2 22 (L) 23 - 33 meq/L    Calcium 9.5 8.5 - 10.5 mg/dL    AST 27 5 - 40 U/L    Alkaline Phosphatase 60 38 - 
No acute intraparenchymal hematoma. No acute depressed skull fracture. Left frontotemporal scalp soft tissue swelling and hematoma. The mastoid air cells and visualized paranasal sinuses are well-aerated. Left gutierrez bullosa. prior bilateral cataract extractions.   Impression: Enlarging left convexity acute subdural hematoma, up to 1.1 cm, previously 0.7 cm. New left-to-right midline shift up to 0.3 cm.     CT HEAD WO CONTRAST  Result Date 1/18/2024  Findings: There is a persistent soft tissue hematoma in the left lateral scalp. There is a very thin residual subdural hematoma on the left lateral to the left temporal lobe. This has continued to improve. There is stable global volume loss. There is no hydrocephalus, midline shift or mass effect.  There is no parenchymal hemorrhage. The paranasal sinuses and mastoid air cells are normally aerated.  There is no suspicious calvarial abnormality.   Impression: Small thin subdural hematoma lateral to the left temporal lobe. This has decreased in size.       Impression:  Subdural hematoma  Fall at home  Supra therapeutic INR on admission (10)  Hematoma left temple   Paroxysmal atrial fibrillation  HTN  HLD  NIDDMII  History of prostate cancer  History of dementia  Pharyngeal Dsyphagia    Recommendations:  Continue to participate in an inpatient rehabilitation program involving at least 3 hours per day, 5 days per week of intensive rehabilitation.  Rehabilitation services will include PT, OT, and SLP/RT in order to improve functional status prior to discharge.  Family education and training will be completed.  Equipment evaluations and recommendations will be completed as appropriate.       Rehabilitation nursing will be involved for bowel, bladder, skin, and pain management.  Nursing will also provide education and training to patient and family.    Dr. Mcarthur consulted for medical management   SDH: 2/2 mechanical fall down stairs. Managed non-operatively. Continue Keppra 
fracture. Left frontotemporal scalp soft tissue swelling and hematoma. The mastoid air cells and visualized paranasal sinuses are well-aerated. Left gutierrez bullosa. prior bilateral cataract extractions.   Impression: Enlarging left convexity acute subdural hematoma, up to 1.1 cm, previously 0.7 cm. New left-to-right midline shift up to 0.3 cm.     CT HEAD WO CONTRAST  Result Date 1/18/2024  Findings: There is a persistent soft tissue hematoma in the left lateral scalp. There is a very thin residual subdural hematoma on the left lateral to the left temporal lobe. This has continued to improve. There is stable global volume loss. There is no hydrocephalus, midline shift or mass effect.  There is no parenchymal hemorrhage. The paranasal sinuses and mastoid air cells are normally aerated.  There is no suspicious calvarial abnormality.   Impression: Small thin subdural hematoma lateral to the left temporal lobe. This has decreased in size.       Impression:  Subdural hematoma  Fall at home  Supra therapeutic INR on admission (10)  Hematoma left temple   Paroxysmal atrial fibrillation  HTN  HLD  NIDDMII  History of prostate cancer  History of dementia  Pharyngeal Dsyphagia    Recommendations:  Continue to participate in an inpatient rehabilitation program involving at least 3 hours per day, 5 days per week of intensive rehabilitation.  Rehabilitation services will include PT, OT, and SLP/RT in order to improve functional status prior to discharge.  Family education and training will be completed.  Equipment evaluations and recommendations will be completed as appropriate.       Rehabilitation nursing will be involved for bowel, bladder, skin, and pain management.  Nursing will also provide education and training to patient and family.    Dr. Mcarthur consulted for medical management   SDH: 2/2 mechanical fall down stairs. Managed non-operatively. Continue Keppra 500 mg BID x 1 week per Neurosurgery recommendations 
to Potential Risks and Complications Due to Impairment/Illness/Injury, and Education Related to Health Promotion and Wellness  Evaluation of Education: Demonstrates with assistance, Needs further instruction, and No evidence of learning    ASSESSMENT/PLAN:  Activity Tolerance:  Patient tolerance of  treatment: fair. Great attempts, however poor performance likely r/t baseline dementia.     Assessment/Plan: Patient progressing toward established goals.  Continues to require skilled care of licensed speech pathologist to progress toward achievement of established goals and plan of care.  Plan for Next Session: O-log, Cog-log, ACE, BASIC problem solving and safety   Discharge Recommendations: Home with Home Exercise Program, Home Health, and Continue to Assess Pending Progress       Jose Bernstein M.S., CCC-SLP [id number]      
4 steps with 1 hand rail and SBA to prepare for home entry. GOAL MET, SEE LTG                  Long Term Goals  Time Frame for Long Term Goals : 3 weeks from initial evaluation  Long Term Goal 1: Patient will complete sit < > stand with modified independence to/from various surfaces to stand to ambulate safely. GOAL NOT MET  Long Term Goal 2: Patient will complete bed < > chair transfer with modified independence to transfer surface to surface safely. GOAL NOT MET  Long Term Goal 3: Patient will ambulate 10' with no AD with modified independence and 75' with no AD with supervision to navigate household distances. GOAL NOT MET  Long Term Goal 4: Patient will ambulate 150' with a straight cane with supervision to navigate community. GOAL NOT MET  Long Term Goal 5: Patient will ascend/descend 12 steps with 1 hand rail with supervision to access bedroom/bathroom GOAL NOT MET  Long term goal 6: Patient will improve MAYES to greater than or equal to >/= 45/56 to reduce his risk for falls. GOAL NOT MET  Long term goal 7: Patient will complete car transfer with supervision to transfer in and out of vehicle safely. GOAL NOT MET      Revised Short-Term Goals:    Short Term Goals  Time Frame for Short Term Goals: 1 week  Short Term Goal 1: Patient will complete supine < > sit with head of bed flat and no rails with modified independence to transfer in and out of bed safely. GOAL MET  Short Term Goal 2: Patient will complete sit < > stand with SBA to/from various surfaces to stand to ambulate safely. GOAL MET  Short Term Goal 3: Patient will ambulate 50' with no AD including at least 2 turns with SBA to progress towards navigating home safely. GOAL MET  Short Term Goal 4: Patient will ambulate 150' with a straight cane and SBA to increase mobility and independence. GOAL MET  Short Term Goal 5: Patient will ascend/descend 4 steps with 1 hand rail and SBA to prepare for home entry. GOAL MET  Additional Goals?: Yes  Short Term Goal 
modified independence to transfer surface to surface safely. GOAL NOT MET  Long Term Goal 3: Patient will ambulate 10' with no AD with modified independence and 75' with no AD with supervision to navigate household distances. GOAL NOT MET  Long Term Goal 4: Patient will ambulate 150' with a straight cane with supervision to navigate community. GOAL NOT MET  Long Term Goal 5: Patient will ascend/descend 12 steps with 1 hand rail with supervision to access bedroom/bathroom GOAL NOT MET  Long term goal 6: Patient will improve MAYES to greater than or equal to >/= 45/56 to reduce his risk for falls. GOAL NOT MET  Long term goal 7: Patient will complete car transfer with supervision to transfer in and out of vehicle safely. GOAL NOT MET                  Following session, patient left in safe position with all fall risk precautions in place.  
to stand to ambulate safely.  Long Term Goal 2: Patient will complete bed < > chair transfer with modified independence to transfer surface to surface safely.  Long Term Goal 3: Patient will ambulate 10' with no AD with modified independence and 75' with no AD with supervision to navigate household distances.  Long Term Goal 4: Patient will ambulate 150' with a straight cane with supervision to navigate community.  Long Term Goal 5: Patient will ascend/descend 12 steps with 1 hand rail with supervision to access bedroom/bathroom  Additional Goals?: Yes  Long term goal 6: Patient will improve MAYES to greater than or equal to >/= 45/56 to reduce his risk for falls.  Long term goal 7: Patient will complete car transfer with supervision to transfer in and out of vehicle safely.    Following session, patient left in safe position with all fall risk precautions in place.

## 2024-01-23 NOTE — CARE COORDINATION
Patient given discharge education. 30 day event monitor applied by EKG. Patient and wife ready for discharge. No complaints or concerns voiced at this time.

## 2024-01-23 NOTE — PLAN OF CARE
Problem: Chronic Conditions and Co-morbidities  Goal: Patient's chronic conditions and co-morbidity symptoms are monitored and maintained or improved  1/17/2024 1111 by Vrooman, Rylee, LPN  Outcome: Progressing  Flowsheets (Taken 1/17/2024 0917)  Care Plan - Patient's Chronic Conditions and Co-Morbidity Symptoms are Monitored and Maintained or Improved:   Monitor and assess patient's chronic conditions and comorbid symptoms for stability, deterioration, or improvement   Collaborate with multidisciplinary team to address chronic and comorbid conditions and prevent exacerbation or deterioration     Problem: Discharge Planning  Goal: Discharge to home or other facility with appropriate resources  1/17/2024 1111 by Vrooman, Rylee, LPN  Outcome: Progressing  Flowsheets (Taken 1/17/2024 0917)  Discharge to home or other facility with appropriate resources:   Identify barriers to discharge with patient and caregiver   Identify discharge learning needs (meds, wound care, etc)     Problem: Skin/Tissue Integrity - Adult  Goal: Skin integrity remains intact  1/17/2024 1111 by Vrooman, Rylee, LPN  Outcome: Progressing  Flowsheets (Taken 1/17/2024 0917)  Skin Integrity Remains Intact:   Monitor for areas of redness and/or skin breakdown   Assess vascular access sites hourly     
  Problem: Discharge Planning  Goal: Discharge to home or other facility with appropriate resources  1/16/2024 0936 by Renea Minor LPN  Outcome: Progressing  Flowsheets (Taken 1/16/2024 0936)  Discharge to home or other facility with appropriate resources:   Identify barriers to discharge with patient and caregiver   Identify discharge learning needs (meds, wound care, etc)   Arrange for needed discharge resources and transportation as appropriate   Arrange for interpreters to assist at discharge as needed  1/15/2024 2333 by Noah Sanchez, RN  Outcome: Progressing  Flowsheets (Taken 1/15/2024 2115)  Discharge to home or other facility with appropriate resources: Identify barriers to discharge with patient and caregiver     Problem: Safety - Adult  Goal: Free from fall injury  1/16/2024 0936 by Renea Minor LPN  Outcome: Progressing  Flowsheets (Taken 1/16/2024 0936)  Free From Fall Injury:   Instruct family/caregiver on patient safety   Based on caregiver fall risk screen, instruct family/caregiver to ask for assistance with transferring infant if caregiver noted to have fall risk factors  1/15/2024 2333 by Noah Sanchez, RN  Outcome: Progressing     Problem: ABCDS Injury Assessment  Goal: Absence of physical injury  1/16/2024 0936 by Renea Minor LPN  Outcome: Progressing  Flowsheets (Taken 1/16/2024 0936)  Absence of Physical Injury: Implement safety measures based on patient assessment  1/15/2024 2333 by Noah Sanchez, RN  Outcome: Progressing     Problem: Skin/Tissue Integrity  Goal: Absence of new skin breakdown  Description: 1.  Monitor for areas of redness and/or skin breakdown  2.  Assess vascular access sites hourly  3.  Every 4-6 hours minimum:  Change oxygen saturation probe site  4.  Every 4-6 hours:  If on nasal continuous positive airway pressure, respiratory therapy assess nares and determine need for appliance change or resting period.  1/16/2024 0936 by Mily 
  Problem: Discharge Planning  Goal: Discharge to home or other facility with appropriate resources  1/16/2024 1236 by Avani Marion LISW  Note: Team conference held Tuesday, 1/16. Recommendations of the team were explained to the patient and his wife, Tasneem by Dr Alvarenga and SW. Team is recommending that patient continue on acute inpatient rehab for PT, OT and ST, with expected discharge date of Tuesday, 1/23. Following discharge, team is recommending home health services for RN, PT, OT, ST and HHA. Care plan reviewed with patient and Tasneem.  Patient and Tasneem verbalized understanding of the plan of care and contributed to goal setting. SW to follow and maintain involvement in discharge planning.      
  Problem: Discharge Planning  Goal: Discharge to home or other facility with appropriate resources  1/18/2024 1212 by Denia Bauman RN  Outcome: Progressing  1/17/2024 2300 by Deedee Caballero RN  Outcome: Progressing  Flowsheets (Taken 1/17/2024 1923)  Discharge to home or other facility with appropriate resources: Identify barriers to discharge with patient and caregiver     Problem: Safety - Adult  Goal: Free from fall injury  1/18/2024 1212 by Denia Bauman RN  Outcome: Progressing  1/17/2024 2300 by Deedee Caballero RN  Outcome: Progressing     Problem: ABCDS Injury Assessment  Goal: Absence of physical injury  1/18/2024 1212 by Denia Bauman RN  Outcome: Progressing  1/17/2024 2300 by Deedee Caballero RN  Outcome: Progressing     Problem: Skin/Tissue Integrity  Goal: Absence of new skin breakdown  Description: 1.  Monitor for areas of redness and/or skin breakdown  2.  Assess vascular access sites hourly  3.  Every 4-6 hours minimum:  Change oxygen saturation probe site  4.  Every 4-6 hours:  If on nasal continuous positive airway pressure, respiratory therapy assess nares and determine need for appliance change or resting period.  1/18/2024 1212 by Denia Bauman RN  Outcome: Progressing  1/17/2024 2300 by Deedee Caballero RN  Outcome: Progressing     Problem: Chronic Conditions and Co-morbidities  Goal: Patient's chronic conditions and co-morbidity symptoms are monitored and maintained or improved  1/18/2024 1212 by Denia Bauman RN  Outcome: Progressing  1/17/2024 2300 by Deedee Caballero RN  Outcome: Progressing  Flowsheets (Taken 1/17/2024 1923)  Care Plan - Patient's Chronic Conditions and Co-Morbidity Symptoms are Monitored and Maintained or Improved: Monitor and assess patient's chronic conditions and comorbid symptoms for stability, deterioration, or improvement     Problem: Nutrition Deficit:  Goal: Optimize nutritional status  1/18/2024 1212 by Denia Bauman RN  Outcome: 
  Problem: Discharge Planning  Goal: Discharge to home or other facility with appropriate resources  1/20/2024 2335 by Candido Adam LPN  Outcome: Progressing  Flowsheets (Taken 1/20/2024 2335)  Discharge to home or other facility with appropriate resources: Identify barriers to discharge with patient and caregiver     Problem: Safety - Adult  Goal: Free from fall injury  1/20/2024 2335 by Candido Adam LPN  Outcome: Progressing  Flowsheets (Taken 1/20/2024 2335)  Free From Fall Injury: Instruct family/caregiver on patient safety     Problem: Chronic Conditions and Co-morbidities  Goal: Patient's chronic conditions and co-morbidity symptoms are monitored and maintained or improved  1/20/2024 2335 by Candido Adam LPN  Outcome: Progressing  Flowsheets (Taken 1/20/2024 2335)  Care Plan - Patient's Chronic Conditions and Co-Morbidity Symptoms are Monitored and Maintained or Improved:   Monitor and assess patient's chronic conditions and comorbid symptoms for stability, deterioration, or improvement   Collaborate with multidisciplinary team to address chronic and comorbid conditions and prevent exacerbation or deterioration   Update acute care plan with appropriate goals if chronic or comorbid symptoms are exacerbated and prevent overall improvement and discharge     Problem: Neurosensory - Adult  Goal: Absence of seizures  1/20/2024 2335 by Candido Adam LPN  Outcome: Progressing  Flowsheets (Taken 1/20/2024 2335)  Absence of seizures:   Monitor for seizure activity.  If seizure occurs, document type and location of movements and any associated apnea   If seizure occurs, turn head to side and suction secretions as needed   Support airway/breathing, administer oxygen as needed     Problem: Skin/Tissue Integrity - Adult  Goal: Skin integrity remains intact  1/20/2024 2335 by Candido Adam LPN  Outcome: Progressing  Flowsheets (Taken 1/20/2024 2330)  Skin Integrity Remains Intact: Monitor for 
  Problem: Discharge Planning  Goal: Discharge to home or other facility with appropriate resources  1/21/2024 2236 by Hannah Massey RN  Outcome: Progressing  Flowsheets (Taken 1/21/2024 2236)  Discharge to home or other facility with appropriate resources:   Identify barriers to discharge with patient and caregiver   Identify discharge learning needs (meds, wound care, etc)   Refer to discharge planning if patient needs post-hospital services based on physician order or complex needs related to functional status, cognitive ability or social support system   Arrange for needed discharge resources and transportation as appropriate     Problem: Safety - Adult  Goal: Free from fall injury  1/21/2024 2236 by Hannah Massey RN  Outcome: Progressing  Flowsheets (Taken 1/21/2024 2236)  Free From Fall Injury: Instruct family/caregiver on patient safety  Note: Patient has remained free of physical injury during this shift. Safe environment provided, call light within reach, and hourly rounding completed.   1Problem: ABCDS Injury Assessment  Goal: Absence of physical injury  1/21/2024 2236 by Hannah Massey RN  Outcome: Progressing  Flowsheets (Taken 1/21/2024 2236)  Absence of Physical Injury: Implement safety measures based on patient assessment  Note: Patient has remained free of physical injury during this shift. Safe environment provided, call light within reach, and hourly rounding completed.   Problem: Pain  Goal: Verbalizes/displays adequate comfort level or baseline comfort level  1/21/2024 2236 by Hannah Massey RN  Outcome: Progressing  Flowsheets (Taken 1/21/2024 2236)  Verbalizes/displays adequate comfort level or baseline comfort level:   Encourage patient to monitor pain and request assistance   Administer analgesics based on type and severity of pain and evaluate response   Assess pain using appropriate pain scale   Implement non-pharmacological measures as appropriate and evaluate 
  Problem: Discharge Planning  Goal: Discharge to home or other facility with appropriate resources  1/22/2024 0940 by Denia Bauman RN  Outcome: Progressing  Flowsheets (Taken 1/22/2024 0929)  Discharge to home or other facility with appropriate resources:   Identify barriers to discharge with patient and caregiver   Arrange for needed discharge resources and transportation as appropriate   Identify discharge learning needs (meds, wound care, etc)   Refer to discharge planning if patient needs post-hospital services based on physician order or complex needs related to functional status, cognitive ability or social support system  1/21/2024 2236 by Hannah Massey RN  Outcome: Progressing  Flowsheets (Taken 1/21/2024 2236)  Discharge to home or other facility with appropriate resources:   Identify barriers to discharge with patient and caregiver   Identify discharge learning needs (meds, wound care, etc)   Refer to discharge planning if patient needs post-hospital services based on physician order or complex needs related to functional status, cognitive ability or social support system   Arrange for needed discharge resources and transportation as appropriate     Problem: Safety - Adult  Goal: Free from fall injury  1/22/2024 0940 by Denia Bauman RN  Outcome: Progressing  1/21/2024 2236 by Hannah Massey RN  Outcome: Progressing  Flowsheets (Taken 1/21/2024 2236)  Free From Fall Injury: Instruct family/caregiver on patient safety  Note: Patient has remained free of physical injury during this shift. Safe environment provided, call light within reach, and hourly rounding completed.      Problem: ABCDS Injury Assessment  Goal: Absence of physical injury  1/22/2024 0940 by Denia Bauman RN  Outcome: Progressing  1/21/2024 2236 by Hannah Massey RN  Outcome: Progressing  Flowsheets (Taken 1/21/2024 2236)  Absence of Physical Injury: Implement safety measures based on patient assessment  Note: 
  Problem: Discharge Planning  Goal: Discharge to home or other facility with appropriate resources  1/22/2024 1013 by Avani Marion LISW  Note: SW met with patient and Tasneem on this date to discuss discharge planning. Tasneem reports wanting to use [facility] at discharge. Tasneem asked for initiate visit to be later in the week. SW answered patient and Tasneem's questions regarding discharge.    SW left a message for [facility] with a referral.    SW will follow and maintain involvement in discharge planning.     
  Problem: Discharge Planning  Goal: Discharge to home or other facility with appropriate resources  1/22/2024 1014 by Avani Marion LISW  Note: Transportation:   Has transportation kept you from medical appointments, meetings, work, or from getting things needed for daily living? (Check all that apply)  No.      Health Literacy:   How often do you need to have someone help you when you read instructions, pamphlets, or other written material from your doctor or pharmacy?  2. - Sometimes    Social Isolation:  How often do you feel lonely or isolated from those around you?  2.  Sometimes      Patient Mood Interview (PHQ-2 to 9) (from [employer]©)   Say to Patient: \"Over the last 2 weeks, have you been bothered by any of the following problems?\"   If symptom is present, enter 1 (yes) in column 1 (Symptom Presence)  If yes in column 1, then ask the patient: “About how often have you been bothered by this?”  Read and show the patient a card with the symptom frequency choices.    Indicate response in column 2, Symptom Frequency.   Symptom Presence  No (enter 0 in column 2)   Yes (enter 0-3 in column 2)  9.    No response (leave column 2 blank) Symptom Frequency  Never or 1 day  2-6 days (several days)  7-11 days (half or more of the days)  12-14 days (nearly every day    Symptom Presence Symptom Frequency   Little interest or pleasure in doing things 0. No 0. Never or 1 day   Feeling down, depressed, or hopeless 0. No 0. Never or 1 day           
  Problem: Discharge Planning  Goal: Discharge to home or other facility with appropriate resources  1/23/2024 0843 by Avani Marion LISW  Note: Patient to be discharged on Tuesday, 1/23 to home. Patient will be under the supervision of his wife, Tasneem. Family education was provided throughout patient's stay. Patient will be receiving services through [facility]. ROBERT provided information to [facility] on 1/22 via Epic. ROBERT left a message for [facility] on this date to confirm patient's discharge.       
  Problem: Discharge Planning  Goal: Discharge to home or other facility with appropriate resources  1/23/2024 1128 by Barb Salvador RN  Outcome: Completed  1/23/2024 0843 by Avani Marion LISW  Note: Patient to be discharged on Tuesday, 1/23 to home. Patient will be under the supervision of his wife, Tasneem. Family education was provided throughout patient's stay. Patient will be receiving services through [facility]. SW provided information to [facility] on 1/22 via Epic. SW left a message for [facility] on this date to confirm patient's discharge.    1/23/2024 0415 by Hannah Massey RN  Outcome: Progressing  1/22/2024 2159 by Hannah Massey RN  Outcome: Progressing  Flowsheets (Taken 1/22/2024 2159)  Discharge to home or other facility with appropriate resources:   Identify barriers to discharge with patient and caregiver   Identify discharge learning needs (meds, wound care, etc)   Refer to discharge planning if patient needs post-hospital services based on physician order or complex needs related to functional status, cognitive ability or social support system   Arrange for needed discharge resources and transportation as appropriate     Problem: Safety - Adult  Goal: Free from fall injury  1/23/2024 1128 by Barb Salvador RN  Outcome: Completed  1/23/2024 0415 by Hannah Massey RN  Outcome: Progressing  1/22/2024 2159 by Hannah Massey RN  Outcome: Progressing  Flowsheets (Taken 1/22/2024 2159)  Free From Fall Injury: Instruct family/caregiver on patient safety  Note: Patient has remained free of physical injury during this shift. Safe environment provided, call light within reach, and hourly rounding completed.      Problem: ABCDS Injury Assessment  Goal: Absence of physical injury  1/23/2024 1128 by Barb Salvador RN  Outcome: Completed  1/23/2024 0415 by Hannah Massey RN  Outcome: Progressing  1/22/2024 2159 by Hannah Massey RN  Outcome: 
  Problem: Discharge Planning  Goal: Discharge to home or other facility with appropriate resources  2024 1226 by Avani Marion LISW  Note:   [facility]  Physical Medicine Case Management Assessment    [x] Inpatient Rehabilitation Unit    Patient Name: Romario Marie        MRN: [id number]    : 1932  (89 y.o.)  Gender: male   Date of Admission: 2024  1:51 PM    Family/Social/Home Environment:   Prior to admission, patient was living with his wife, Tasneem. Patient reported being independent at home. Patient was completing his ADLs and some housekeeping. Patient shares household tasks with Tasneem. Tasneem manages the cooking, finances and transportation. Patient is a retired  from the [employer]. Patient's main support is Tasneem. They have a daughter, Ronda, who lives in [place]. Patient's family physician is Steve Santoro MD. Patient prefers [facility]. Patient has a cane and walker at home. Patient was not using assistive devices at home. Only when he was out of the home. Patient is motivated to participate in therapy and return to his prior level of functioning.    Social/Functional History  Lives With: Spouse  Type of Home: House  Home Layout: Two level, Bed/Bath upstairs (there is a bathroom on main floor as well)  Home Access: Stairs to enter without rails  Entrance Stairs - Number of Steps: 2 WILLIAMS, flight of steps to 2nd floor with right hand rail  Bathroom Shower/Tub: Tub/Shower unit, Walk-in shower  Bathroom Toilet: Handicap height  Bathroom Equipment: Built-in shower seat, Grab bars in shower  Bathroom Accessibility: Accessible  Home Equipment: Cane, Walker, rolling  Has the patient had two or more falls in the past year or any fall with injury in the past year?: Yes  Receives Help From: Family  ADL Assistance: Independent  Homemaking Assistance:  (shares duties with wife)  Ambulation Assistance: Independent  Transfer Assistance: Independent  Active : 
  Problem: Discharge Planning  Goal: Discharge to home or other facility with appropriate resources  Outcome: Progressing  Flowsheets  Taken 1/11/2024 1543  Discharge to home or other facility with appropriate resources:   Identify barriers to discharge with patient and caregiver   Identify discharge learning needs (meds, wound care, etc)  Taken 1/11/2024 1419  Discharge to home or other facility with appropriate resources:   Identify barriers to discharge with patient and caregiver   Arrange for needed discharge resources and transportation as appropriate   Identify discharge learning needs (meds, wound care, etc)     Problem: Safety - Adult  Goal: Free from fall injury  Outcome: Progressing  Flowsheets (Taken 1/11/2024 1543)  Free From Fall Injury: Instruct family/caregiver on patient safety     Problem: ABCDS Injury Assessment  Goal: Absence of physical injury  Outcome: Progressing  Flowsheets (Taken 1/11/2024 1543)  Absence of Physical Injury: Implement safety measures based on patient assessment     Problem: Skin/Tissue Integrity  Goal: Absence of new skin breakdown  Description: 1.  Monitor for areas of redness and/or skin breakdown  2.  Assess vascular access sites hourly  3.  Every 4-6 hours minimum:  Change oxygen saturation probe site  4.  Every 4-6 hours:  If on nasal continuous positive airway pressure, respiratory therapy assess nares and determine need for appliance change or resting period.  Outcome: Progressing     
  Problem: Discharge Planning  Goal: Discharge to home or other facility with appropriate resources  Outcome: Progressing  Flowsheets  Taken 1/14/2024 2144 by Hannah Massey RN  Discharge to home or other facility with appropriate resources: Identify barriers to discharge with patient and caregiver    Problem: Safety - Adult  Goal: Free from fall injury  1/14/2024 2144 by Hannah Massey RN  Outcome: Progressing  Flowsheets (Taken 1/14/2024 2144)  Free From Fall Injury: Instruct family/caregiver on patient safety  Note: Patient has remained free of physical injury during this shift. Safe environment provided, call light within reach, and hourly rounding completed.      Problem: ABCDS Injury Assessment  Goal: Absence of physical injury  Outcome: Progressing  Flowsheets (Taken 1/14/2024 2144)  Absence of Physical Injury: Implement safety measures based on patient assessment  Note: Patient has remained free of physical injury during this shift. Safe environment provided, call light within reach, and hourly rounding completed.      Problem: Chronic Conditions and Co-morbidities  Goal: Patient's chronic conditions and co-morbidity symptoms are monitored and maintained or improved  Outcome: Progressing  Flowsheets  Taken 1/14/2024 2144 by Hannah Massey RN  Care Plan - Patient's Chronic Conditions and Co-Morbidity Symptoms are Monitored and Maintained or Improved: Monitor and assess patient's chronic conditions and comorbid symptoms for stability, deterioration, or improvement      Problem: Pain  Goal: Verbalizes/displays adequate comfort level or baseline comfort level  Outcome: Progressing     Care plan reviewed with patient.  Patient  verbalize understanding of the plan of care and contribute to goal setting.     
  Problem: Discharge Planning  Goal: Discharge to home or other facility with appropriate resources  Outcome: Progressing  Flowsheets (Taken 1/12/2024 1145)  Discharge to home or other facility with appropriate resources:   Identify barriers to discharge with patient and caregiver   Identify discharge learning needs (meds, wound care, etc)   Arrange for needed discharge resources and transportation as appropriate     Problem: Safety - Adult  Goal: Free from fall injury  1/12/2024 1145 by Renea Minor LPN  Outcome: Progressing  Flowsheets (Taken 1/12/2024 1145)  Free From Fall Injury:   Instruct family/caregiver on patient safety   Based on caregiver fall risk screen, instruct family/caregiver to ask for assistance with transferring infant if caregiver noted to have fall risk factors  1/12/2024 0813 by Ellie Tyler, RN  Outcome: Progressing     Problem: ABCDS Injury Assessment  Goal: Absence of physical injury  Outcome: Progressing  Flowsheets (Taken 1/11/2024 1543 by Denia Bronson, RN)  Absence of Physical Injury: Implement safety measures based on patient assessment     Problem: Skin/Tissue Integrity  Goal: Absence of new skin breakdown  Description: 1.  Monitor for areas of redness and/or skin breakdown  2.  Assess vascular access sites hourly  3.  Every 4-6 hours minimum:  Change oxygen saturation probe site  4.  Every 4-6 hours:  If on nasal continuous positive airway pressure, respiratory therapy assess nares and determine need for appliance change or resting period.  Outcome: Progressing  Note: No new skin breakdown this shift     Problem: Chronic Conditions and Co-morbidities  Goal: Patient's chronic conditions and co-morbidity symptoms are monitored and maintained or improved  Outcome: Progressing  Flowsheets (Taken 1/12/2024 1145)  Care Plan - Patient's Chronic Conditions and Co-Morbidity Symptoms are Monitored and Maintained or Improved:   Monitor and assess patient's chronic conditions and 
  Problem: Discharge Planning  Goal: Discharge to home or other facility with appropriate resources  Outcome: Progressing  Flowsheets (Taken 1/15/2024 2115)  Discharge to home or other facility with appropriate resources: Identify barriers to discharge with patient and caregiver     Problem: Safety - Adult  Goal: Free from fall injury  Outcome: Progressing     Problem: ABCDS Injury Assessment  Goal: Absence of physical injury  Outcome: Progressing     Problem: Skin/Tissue Integrity  Goal: Absence of new skin breakdown  Description: 1.  Monitor for areas of redness and/or skin breakdown  Outcome: Progressing     Care plan reviewed with patient.  Patient verbalize understanding of the plan of care and contribute to goal setting.     
  Problem: Discharge Planning  Goal: Discharge to home or other facility with appropriate resources  Outcome: Progressing  Flowsheets (Taken 1/20/2024 0830)  Discharge to home or other facility with appropriate resources:   Identify barriers to discharge with patient and caregiver   Arrange for needed discharge resources and transportation as appropriate   Identify discharge learning needs (meds, wound care, etc)   Refer to discharge planning if patient needs post-hospital services based on physician order or complex needs related to functional status, cognitive ability or social support system     Problem: Safety - Adult  Goal: Free from fall injury  1/20/2024 0950 by Denia Bauman, RN  Outcome: Progressing  1/19/2024 2257 by Candido Adam LPN  Outcome: Progressing     Problem: ABCDS Injury Assessment  Goal: Absence of physical injury  Outcome: Progressing     Problem: Skin/Tissue Integrity  Goal: Absence of new skin breakdown  Description: 1.  Monitor for areas of redness and/or skin breakdown  2.  Assess vascular access sites hourly  3.  Every 4-6 hours minimum:  Change oxygen saturation probe site  4.  Every 4-6 hours:  If on nasal continuous positive airway pressure, respiratory therapy assess nares and determine need for appliance change or resting period.  Outcome: Progressing     Problem: Chronic Conditions and Co-morbidities  Goal: Patient's chronic conditions and co-morbidity symptoms are monitored and maintained or improved  1/20/2024 0950 by Denia Bauman RN  Outcome: Progressing  Flowsheets (Taken 1/20/2024 0830)  Care Plan - Patient's Chronic Conditions and Co-Morbidity Symptoms are Monitored and Maintained or Improved:   Monitor and assess patient's chronic conditions and comorbid symptoms for stability, deterioration, or improvement   Collaborate with multidisciplinary team to address chronic and comorbid conditions and prevent exacerbation or deterioration   Update acute care plan with 
  Problem: Respiratory - Adult  Goal: Achieves optimal ventilation and oxygenation  Outcome: Progressing  Flowsheets (Taken 1/23/2024 2315)  Achieves optimal ventilation and oxygenation: Encourage broncho-pulmonary hygiene including cough, deep breathe, incentive spirometry   Care plan reviewed with patient.  Patient  verbalize understanding of the plan of care and contribute to goal setting.     
  Problem: Safety - Adult  Goal: Free from fall injury  1/13/2024 0009 by Deedee Caballero RN  Outcome: Progressing     Problem: ABCDS Injury Assessment  Goal: Absence of physical injury  1/13/2024 0009 by Deedee Caballero RN  Outcome: Progressing     Problem: Skin/Tissue Integrity  Goal: Absence of new skin breakdown  Description: 1.  Monitor for areas of redness and/or skin breakdown  2.  Assess vascular access sites hourly  3.  Every 4-6 hours minimum:  Change oxygen saturation probe site  4.  Every 4-6 hours:  If on nasal continuous positive airway pressure, respiratory therapy assess nares and determine need for appliance change or resting period.  1/13/2024 0009 by Deedee Caballero RN  Outcome: Progressing     Care plan reviewed with patient and verbalizes understanding of the plan of care and contribute to goal setting.     
  Problem: Safety - Adult  Goal: Free from fall injury  1/13/2024 1744 by Christine William LPN  Outcome: Progressing  .fall     Problem: Skin/Tissue Integrity  Goal: Absence of new skin breakdown  Description: 1.  Monitor for areas of redness and/or skin breakdown  2.  Assess vascular access sites hourly  3.  Every 4-6 hours minimum:  Change oxygen saturation probe site  4.  Every 4-6 hours:  If on nasal continuous positive airway pressure, respiratory therapy assess nares and determine need for appliance change or resting period.  1/13/2024 1744 by Christine William LPN  Outcome: Progressing     Problem: Neurosensory - Adult  Goal: Absence of seizures  Outcome: Progressing     
  Problem: Safety - Adult  Goal: Free from fall injury  1/13/2024 2228 by Deedee Caballero RN  Outcome: Progressing     Problem: ABCDS Injury Assessment  Goal: Absence of physical injury  Outcome: Progressing     Problem: Skin/Tissue Integrity  Goal: Absence of new skin breakdown  Description: 1.  Monitor for areas of redness and/or skin breakdown  2.  Assess vascular access sites hourly  3.  Every 4-6 hours minimum:  Change oxygen saturation probe site  4.  Every 4-6 hours:  If on nasal continuous positive airway pressure, respiratory therapy assess nares and determine need for appliance change or resting period.  1/13/2024 2228 by Deedee Caballero RN  Outcome: Progressing     Problem: Skin/Tissue Integrity - Adult  Goal: Skin integrity remains intact  Outcome: Progressing  Flowsheets  Taken 1/13/2024 1946 by Deedee Caballero RN  Skin Integrity Remains Intact: Monitor for areas of redness and/or skin breakdown  Taken 1/13/2024 1741 by Christine William LPN  Skin Integrity Remains Intact: Monitor for areas of redness and/or skin breakdown  Taken 1/13/2024 1739 by Christine William LPN  Skin Integrity Remains Intact: Monitor for areas of redness and/or skin breakdown     Problem: Pain  Goal: Verbalizes/displays adequate comfort level or baseline comfort level  Outcome: Progressing     Care plan reviewed with patient and verbalizes understanding of the plan of care and contribute to goal setting.     
  Problem: Safety - Adult  Goal: Free from fall injury  1/17/2024 2300 by Deedee Caballero RN  Outcome: Progressing     Problem: ABCDS Injury Assessment  Goal: Absence of physical injury  1/17/2024 2300 by Deedee Caballero RN  Outcome: Progressing     Problem: Skin/Tissue Integrity  Goal: Absence of new skin breakdown  Description: 1.  Monitor for areas of redness and/or skin breakdown  2.  Assess vascular access sites hourly  3.  Every 4-6 hours minimum:  Change oxygen saturation probe site  4.  Every 4-6 hours:  If on nasal continuous positive airway pressure, respiratory therapy assess nares and determine need for appliance change or resting period.  1/17/2024 2300 by Deedee Caballero RN  Outcome: Progressing     Problem: Pain  Goal: Verbalizes/displays adequate comfort level or baseline comfort level  1/17/2024 2300 by Deedee Caballero RN  Outcome: Progressing    Care plan reviewed with patient and verbalizes understanding of the plan of care and contribute to goal setting.        
  Problem: Safety - Adult  Goal: Free from fall injury  1/19/2024 2257 by Candido Adam LPN  Outcome: Progressing     Problem: Chronic Conditions and Co-morbidities  Goal: Patient's chronic conditions and co-morbidity symptoms are monitored and maintained or improved  1/19/2024 2257 by Candido Adam LPN  Outcome: Progressing  Flowsheets (Taken 1/19/2024 2257)  Care Plan - Patient's Chronic Conditions and Co-Morbidity Symptoms are Monitored and Maintained or Improved:   Monitor and assess patient's chronic conditions and comorbid symptoms for stability, deterioration, or improvement   Collaborate with multidisciplinary team to address chronic and comorbid conditions and prevent exacerbation or deterioration     Problem: Neurosensory - Adult  Goal: Absence of seizures  1/19/2024 2257 by Candido Adam LPN  Outcome: Progressing  Flowsheets (Taken 1/19/2024 2257)  Absence of seizures:   Monitor for seizure activity.  If seizure occurs, document type and location of movements and any associated apnea   If seizure occurs, turn head to side and suction secretions as needed   Administer anticonvulsants as ordered   Support airway/breathing, administer oxygen as needed     Problem: Pain  Goal: Verbalizes/displays adequate comfort level or baseline comfort level  1/19/2024 2257 by Candido Adam LPN  Outcome: Progressing     
  Problem: Safety - Adult  Goal: Free from fall injury  Outcome: Progressing     Problem: ABCDS Injury Assessment  Goal: Absence of physical injury  Outcome: Progressing     Problem: Skin/Tissue Integrity  Goal: Absence of new skin breakdown  Description: 1.  Monitor for areas of redness and/or skin breakdown  2.  Assess vascular access sites hourly  3.  Every 4-6 hours minimum:  Change oxygen saturation probe site  4.  Every 4-6 hours:  If on nasal continuous positive airway pressure, respiratory therapy assess nares and determine need for appliance change or resting period.  Outcome: Progressing     Problem: Pain  Goal: Verbalizes/displays adequate comfort level or baseline comfort level  Outcome: Progressing  Flowsheets (Taken 1/16/2024 1951)  Verbalizes/displays adequate comfort level or baseline comfort level: Encourage patient to monitor pain and request assistance    Care plan reviewed with patient and verbalizes understanding of the plan of care and contribute to goal setting.        
  Problem: Safety - Adult  Goal: Free from fall injury  Outcome: Progressing   Baseline confusion, not aware of name or   
  Problem: Safety - Adult  Goal: Free from fall injury  Outcome: Progressing  Pt will remain free of falls.  Pt is 1 assist with gait belt and cane.      Problem: Skin/Tissue Integrity  Goal: Absence of new skin breakdown  Description: 1.  Monitor for areas of redness and/or skin breakdown  2.  Assess vascular access sites hourly  3.  Every 4-6 hours minimum:  Change oxygen saturation probe site  4.  Every 4-6 hours:  If on nasal continuous positive airway pressure, respiratory therapy assess nares and determine need for appliance change or resting period.  Outcome: Progressing  Skin assessment every shift.  No new areas of skin breakdown, facial bruising is getting better, no pain noted.      Problem: Neurosensory - Adult  Goal: Absence of seizures  Outcome: Progressing  No seizure activity noted.      
  Problem: Skin/Tissue Integrity - Adult  Goal: Skin integrity remains intact  Recent Flowsheet Documentation  Taken 1/13/2024 1741 by Christine William LPN  Skin Integrity Remains Intact: Monitor for areas of redness and/or skin breakdown  Skin assessment every shift.  No new areas of skin breakdown, bruising to face and head is becoming less noticable     Problem: Neurosensory - Adult  Goal: Absence of seizures  1/13/2024 1746 by Christine William LPN  Outcome: Progressing  No seizure activity noted       
Chronic Conditions and Co-Morbidity Symptoms are Monitored and Maintained or Improved:   Monitor and assess patient's chronic conditions and comorbid symptoms for stability, deterioration, or improvement   Collaborate with multidisciplinary team to address chronic and comorbid conditions and prevent exacerbation or deterioration   Update acute care plan with appropriate goals if chronic or comorbid symptoms are exacerbated and prevent overall improvement and discharge  1/20/2024 2335 by Candido Adam LPN  Outcome: Progressing  Flowsheets (Taken 1/20/2024 2335)  Care Plan - Patient's Chronic Conditions and Co-Morbidity Symptoms are Monitored and Maintained or Improved:   Monitor and assess patient's chronic conditions and comorbid symptoms for stability, deterioration, or improvement   Collaborate with multidisciplinary team to address chronic and comorbid conditions and prevent exacerbation or deterioration   Update acute care plan with appropriate goals if chronic or comorbid symptoms are exacerbated and prevent overall improvement and discharge     Problem: Nutrition Deficit:  Goal: Optimize nutritional status  Outcome: Progressing     Problem: Neurosensory - Adult  Goal: Achieves stable or improved neurological status  Outcome: Progressing  Flowsheets (Taken 1/21/2024 0900)  Achieves stable or improved neurological status: Assess for and report changes in neurological status  Goal: Absence of seizures  1/21/2024 1154 by Denia Bauman, RN  Outcome: Progressing  Flowsheets (Taken 1/21/2024 0900)  Absence of seizures: Monitor for seizure activity.  If seizure occurs, document type and location of movements and any associated apnea  1/20/2024 2335 by Candido Adam LPN  Outcome: Progressing  Flowsheets (Taken 1/20/2024 2335)  Absence of seizures:   Monitor for seizure activity.  If seizure occurs, document type and location of movements and any associated apnea   If seizure occurs, turn head to side and 
Co-Morbidity Symptoms are Monitored and Maintained or Improved: Monitor and assess patient's chronic conditions and comorbid symptoms for stability, deterioration, or improvement     Problem: Nutrition Deficit:  Goal: Optimize nutritional status  1/19/2024 1046 by Macy Espinosa LPN  Outcome: Progressing  Flowsheets (Taken 1/19/2024 1046)  Nutrient intake appropriate for improving, restoring, or maintaining nutritional needs:   Assess nutritional status and recommend course of action   Monitor oral intake, labs, and treatment plans     Problem: Neurosensory - Adult  Goal: Achieves stable or improved neurological status  1/19/2024 1046 by Macy Espinosa LPN  Outcome: Progressing  Flowsheets (Taken 1/19/2024 1046)  Achieves stable or improved neurological status: Assess for and report changes in neurological status     Problem: Neurosensory - Adult  Goal: Absence of seizures  1/19/2024 1046 by Macy Espinosa LPN  Outcome: Progressing     Problem: Skin/Tissue Integrity - Adult  Goal: Skin integrity remains intact  1/19/2024 1046 by Macy Espinosa LPN  Outcome: Progressing  Flowsheets  Taken 1/19/2024 1046  Skin Integrity Remains Intact: Monitor for areas of redness and/or skin breakdown  Taken 1/19/2024 1043  Skin Integrity Remains Intact: Monitor for areas of redness and/or skin breakdown     Problem: Pain  Goal: Verbalizes/displays adequate comfort level or baseline comfort level  1/19/2024 1046 by Macy Espinosa LPN  Outcome: Progressing  Flowsheets (Taken 1/19/2024 1046)  Verbalizes/displays adequate comfort level or baseline comfort level:   Encourage patient to monitor pain and request assistance   Assess pain using appropriate pain scale   Administer analgesics based on type and severity of pain and evaluate response   Implement non-pharmacological measures as appropriate and evaluate response     
RN  Outcome: Progressing  Note: No new skin breakdown     Problem: Pain  Goal: Verbalizes/displays adequate comfort level or baseline comfort level  1/22/2024 2159 by Hannah Massey, RN  Outcome: Progressing  Flowsheets (Taken 1/22/2024 2159)  Verbalizes/displays adequate comfort level or baseline comfort level:   Encourage patient to monitor pain and request assistance   Administer analgesics based on type and severity of pain and evaluate response   Consider cultural and social influences on pain and pain management   Assess pain using appropriate pain scale   Implement non-pharmacological measures as appropriate and evaluate response    Care plan reviewed with patient.  Patient  verbalize understanding of the plan of care and contribute to goal setting.     
setting.     
breakdown     Problem: Pain  Goal: Verbalizes/displays adequate comfort level or baseline comfort level  1/15/2024 0933 by Renea Minor LPN  Outcome: Progressing  Flowsheets (Taken 1/15/2024 0933)  Verbalizes/displays adequate comfort level or baseline comfort level:   Encourage patient to monitor pain and request assistance   Administer analgesics based on type and severity of pain and evaluate response   Assess pain using appropriate pain scale  1/14/2024 2144 by Hannah Massey, RN  Outcome: Progressing     
strategies and stable pulmonary status to meet hydration/nutrition needs.  Goal 2: Patient will complete functional carryover tasks (orientation with environmental aids, biographics, call light) with 50% accuracy with mod cues to improve awareness and participation in current environment.  Goal 3: Patient will complete basic safety awareness, problem solving, verbal/visual reasoning (hospital, home-related), and sequencing tasks with 50% accuracy with mod cues to improve contributions within ADLs.  Goal 4: Patient will complete receptive langauge tasks (mildly complex yes/no questions, following 2-step commands, functional reading/verbal comprehension) wtih 70% accuracy with mod cues to improve comprehension of daily communication.  Goal 5: Patient will complete expressive langauge tasks (confrontational naming, responsive naming, phrase repetition) with 70% accuracy with mod cues to improve expression of daily wants/needs.  Goal 6: Patient and family with demonstrate understanding and implement established low stimulation guidelines to promote neurological healing.    Plan of Care: Pt to be seen by speech therapy services 60 minutes 5 times per week.                                                                                                                              Anticipated interventions may include speech/language/communication therapy, cognitive training, group therapy, education, and/or dysphagia therapy based on the above goals.     CASE MANAGEMENT:  Goals:   Assist patient/family with discharge planning, patient/family counseling,  and coordination with insurance during the inpatient rehabilitation stay.         Other members of the multidisciplinary rehabilitation team that will be involved in the patient's plan of care include recreational therapy, dietary, respiratory therapy, and neuropsychology.    Medical issues being managed closely and that require 24 hour availability of a

## 2024-01-24 ENCOUNTER — TELEPHONE (OUTPATIENT)
Dept: CARDIOLOGY CLINIC | Age: 89
End: 2024-01-24

## 2024-01-24 DIAGNOSIS — R00.1 BRADYCARDIA: ICD-10-CM

## 2024-01-24 DIAGNOSIS — I48.21 PERMANENT ATRIAL FIBRILLATION (HCC): Primary | ICD-10-CM

## 2024-01-25 NOTE — TELEPHONE ENCOUNTER
More strips scanned in chart     Spoke with wife   Referral placed     Wife states he feels fine but is aware of ER signs

## 2024-01-26 ENCOUNTER — TELEPHONE (OUTPATIENT)
Dept: CARDIOLOGY CLINIC | Age: 89
End: 2024-01-26

## 2024-01-29 NOTE — PATIENT INSTRUCTIONS
You may receive a survey regarding the care you received during your visit.  Your input is valuable to us.  We encourage you to complete and return your survey.  We hope you will choose us in the future for your healthcare needs.    Thank you for choosing [facility]!    Your Medical Assistant Today:  Denia CROOKS      Your Provider for Today: Dr. Carpenter  Ph. [phone]          Continue to wear the Cardiac Event Monitor-  follow up with Dr. Henry's office.

## 2024-01-29 NOTE — TELEPHONE ENCOUNTER
I DID CALL THIS PT AND SPOKE TO HIS WIFE      HE DOES NOT HAVE ANY OTHER CARDIOLOGIST BESIDES YOU. WIFE SAID HE HAS NEVER EVER SEEN A CARDIOLOGIST IN THE PAST      YOU WANTED US TO FAX HIS STRIPS TO HIS CARDIOLOGIST

## 2024-01-29 NOTE — PROGRESS NOTES
Running Out of Food in the Last Year: Never true     Ran Out of Food in the Last Year: Never true   Transportation Needs: No Transportation Needs (1/11/2024)    PRAPARE - Transportation     Lack of Transportation (Medical): No     Lack of Transportation (Non-Medical): No   Housing Stability: Low Risk  (1/11/2024)    Housing Stability Vital Sign     Unable to Pay for Housing in the Last Year: No     Number of Places Lived in the Last Year: 1     Unstable Housing in the Last Year: No        Allergies:  No Known Allergies     Medications:  Current Outpatient Medications   Medication Sig Dispense Refill    POTASSIUM CHLORIDE ER PO Take by mouth Doseage unknown      warfarin (COUMADIN) 1 MG tablet Take 1 tablet by mouth 1 mg  4 days a week  . .5 three days a week      MEMANTINE HCL PO Take by mouth Not sure of doseage      DONEPEZIL HCL PO Take by mouth Not sure of dose      Multiple Vitamins-Minerals (THERAPEUTIC MULTIVITAMIN-MINERALS) tablet Take 1 tablet by mouth daily Vitaprime      metFORMIN (GLUCOPHAGE-XR) 500 MG extended release tablet Take 1 tablet by mouth daily (with breakfast) 3 tablets with supper      alendronate (FOSAMAX) 70 MG tablet Take 1 tablet by mouth every 7 days      lisinopril-hydrochlorothiazide (PRINZIDE;ZESTORETIC) 10-12.5 MG per tablet Take 1 tablet by mouth daily Indications: 5 mg      simvastatin (ZOCOR) 20 MG tablet Take 1 tablet by mouth nightly      calcium carbonate (OSCAL) 500 MG TABS tablet Take 1 tablet by mouth daily      magnesium 30 MG tablet Take 1 tablet by mouth daily      vitamin D (CHOLECALCIFEROL) 1000 UNIT TABS tablet Take 1 tablet by mouth daily      polyethylene glycol (GLYCOLAX) 17 g packet Take 1 packet by mouth daily as needed for Constipation (Patient not taking: Reported on 1/30/2024) 527 g 1    blood glucose test strips (ASCENSIA AUTODISC VI;ONE TOUCH ULTRA TEST VI) strip 1 each by In Vitro route daily As needed.       No current facility-administered medications for

## 2024-01-30 ENCOUNTER — OFFICE VISIT (OUTPATIENT)
Dept: CARDIOLOGY CLINIC | Age: 89
End: 2024-01-30
Payer: MEDICARE

## 2024-01-30 VITALS
HEART RATE: 77 BPM | DIASTOLIC BLOOD PRESSURE: 78 MMHG | OXYGEN SATURATION: 97 % | SYSTOLIC BLOOD PRESSURE: 151 MMHG | WEIGHT: 167 LBS | BODY MASS INDEX: 24.73 KG/M2 | HEIGHT: 69 IN

## 2024-01-30 DIAGNOSIS — R00.1 BRADYCARDIA: Primary | ICD-10-CM

## 2024-01-30 PROCEDURE — G8420 CALC BMI NORM PARAMETERS: HCPCS | Performed by: INTERNAL MEDICINE

## 2024-01-30 PROCEDURE — 99204 OFFICE O/P NEW MOD 45 MIN: CPT | Performed by: INTERNAL MEDICINE

## 2024-01-30 PROCEDURE — 93000 ELECTROCARDIOGRAM COMPLETE: CPT | Performed by: INTERNAL MEDICINE

## 2024-01-30 PROCEDURE — G8484 FLU IMMUNIZE NO ADMIN: HCPCS | Performed by: INTERNAL MEDICINE

## 2024-01-30 PROCEDURE — G8427 DOCREV CUR MEDS BY ELIG CLIN: HCPCS | Performed by: INTERNAL MEDICINE

## 2024-01-30 PROCEDURE — 1036F TOBACCO NON-USER: CPT | Performed by: INTERNAL MEDICINE

## 2024-01-30 PROCEDURE — 1111F DSCHRG MED/CURRENT MED MERGE: CPT | Performed by: INTERNAL MEDICINE

## 2024-01-30 PROCEDURE — 1123F ACP DISCUSS/DSCN MKR DOCD: CPT | Performed by: INTERNAL MEDICINE

## 2024-01-30 RX ORDER — WARFARIN SODIUM 1 MG/1
1 TABLET ORAL
COMMUNITY

## 2024-02-21 ENCOUNTER — TELEPHONE (OUTPATIENT)
Dept: CARDIOLOGY CLINIC | Age: 89
End: 2024-02-21

## 2024-02-21 NOTE — TELEPHONE ENCOUNTER
Please see Event Monitor Strips from 2/20/24 @ 2231 and 2/21/24 at  0018 and 0034.

## 2024-02-21 NOTE — TELEPHONE ENCOUNTER
WIFE CALLED THE OFFICE BACK AND SHE SAID HE WAS IN BED SLEEPING AT THE TIME. I TOLD HER AGAIN, IF THIS PT IS AWAKE AND HE GETS LT HEADED, DIZZY OR FEELS LIKE PASSING OUT , THEY NEED TO GET TO ER . WIFE SAID HE HAS NEVER HAD ANYTHING LIKE THIS WHILE HE HAS BEEN AWAKE

## 2024-02-26 ENCOUNTER — TELEPHONE (OUTPATIENT)
Dept: CARDIOLOGY CLINIC | Age: 89
End: 2024-02-26

## 2024-02-26 NOTE — TELEPHONE ENCOUNTER
Impression:  Mechanical fall resulting in small subdural hematoma and soft tissue injury, managed conservatively.  Intermittent sinus bradycardia, high grade AV block.  Baseline prolonged DC interval and sinoatrial block.  Paroxysmal atrial fibrillation, on Warfarin.   Recent fall related subdural hematoma.         Assessment And Recommendations:  The patient's  bradycardia events occurred primarily during the sleep.  He had 1 event of sinus bradycardia and Av block while he was getting ready to sleep but did not report any symptom at that time.   He has baseline dementia.  I think is reasonable to continue monitoring him at this time.  Further recommendation depending on the findings of the event monitor.     **This report has been created using voice recognition software. It may contain minor errors which are inherent in voice recognition technology.**         Electronically signed by Tejal Carpenter MD, MRCP, FACC, RS on 1/30/2024 at 8:18 AM        30 day monitor not resulted yet.    Tracking     There are event strips scanned in - Jayden Reviewed on separate encounter.    02.29.2024-  not resulted

## 2024-02-29 LAB — ECHO BSA: 1.9 M2

## 2024-03-04 NOTE — TELEPHONE ENCOUNTER
Impression:  Mechanical fall resulting in small subdural hematoma and soft tissue injury, managed conservatively.  Intermittent sinus bradycardia, high grade AV block.  Baseline prolonged MN interval and sinoatrial block.  Paroxysmal atrial fibrillation, on Warfarin.   Recent fall related subdural hematoma.         Assessment And Recommendations:  The patient's  bradycardia events occurred primarily during the sleep.  He had 1 event of sinus bradycardia and Av block while he was getting ready to sleep but did not report any symptom at that time.   He has baseline dementia.  I think is reasonable to continue monitoring him at this time.  Further recommendation depending on the findings of the event monitor.     **This report has been created using voice recognition software. It may contain minor errors which are inherent in voice recognition technology.**         Electronically signed by Tejal Carpenter MD, MRCP, FACC, RS on 1/30/2024 at 8:18 AM       Please see the monitor results-  please advise

## 2024-03-04 NOTE — TELEPHONE ENCOUNTER
Call to patient. Spoke with him and wife. Updated on need for a pacemaker. All questions at the time answered. They wanted to talk it over, but have plans to move forward. Aware Wanda will be calling to get procedure scheduled.

## 2024-03-11 NOTE — TELEPHONE ENCOUNTER
Spoke with the patient's wife  Tasneem - She says that [facility] ([employer] ) does NOT want to move forward with the pacemaker at this time.      She was transferred to the Phone room to get Apt with Dr. Henry rescheduled.

## 2024-03-19 ENCOUNTER — OFFICE VISIT (OUTPATIENT)
Dept: CARDIOLOGY CLINIC | Age: 89
End: 2024-03-19
Payer: MEDICARE

## 2024-03-19 VITALS
DIASTOLIC BLOOD PRESSURE: 80 MMHG | HEIGHT: 69 IN | HEART RATE: 74 BPM | SYSTOLIC BLOOD PRESSURE: 150 MMHG | WEIGHT: 167.8 LBS | BODY MASS INDEX: 24.85 KG/M2

## 2024-03-19 DIAGNOSIS — I48.21 PERMANENT ATRIAL FIBRILLATION (HCC): Primary | ICD-10-CM

## 2024-03-19 PROCEDURE — G8420 CALC BMI NORM PARAMETERS: HCPCS | Performed by: INTERNAL MEDICINE

## 2024-03-19 PROCEDURE — 1036F TOBACCO NON-USER: CPT | Performed by: INTERNAL MEDICINE

## 2024-03-19 PROCEDURE — 1123F ACP DISCUSS/DSCN MKR DOCD: CPT | Performed by: INTERNAL MEDICINE

## 2024-03-19 PROCEDURE — G8484 FLU IMMUNIZE NO ADMIN: HCPCS | Performed by: INTERNAL MEDICINE

## 2024-03-19 PROCEDURE — 99214 OFFICE O/P EST MOD 30 MIN: CPT | Performed by: INTERNAL MEDICINE

## 2024-03-19 PROCEDURE — G8427 DOCREV CUR MEDS BY ELIG CLIN: HCPCS | Performed by: INTERNAL MEDICINE

## 2024-03-19 NOTE — PROGRESS NOTES
Hospital follow up.   Denies any cardiac symptoms of concerns today.   Med list up to date and pharmacy verified.   Last EKG  01/30/2024 with Dr. Carpenter's visit.   
Results   Component Value Date/Time     01/22/2024 10:54 AM    K 4.4 01/22/2024 10:54 AM    K 4.4 01/12/2024 07:09 AM    CL 99 01/22/2024 10:54 AM    CO2 24 01/22/2024 10:54 AM    BUN 19 01/22/2024 10:54 AM    LABALBU 3.8 01/15/2024 06:25 AM    CREATININE 0.7 01/22/2024 10:54 AM    CALCIUM 9.7 01/22/2024 10:54 AM    LABGLOM >60 01/22/2024 10:54 AM    GLUCOSE 210 01/22/2024 10:54 AM       Lab Results   Component Value Date/Time    ALKPHOS 60 01/15/2024 06:25 AM    ALT 24 01/15/2024 06:25 AM    AST 27 01/15/2024 06:25 AM    PROT 6.8 01/15/2024 06:25 AM    BILITOT 1.4 01/15/2024 06:25 AM    BILIDIR 0.4 01/12/2024 07:09 AM    LABALBU 3.8 01/15/2024 06:25 AM       Lab Results   Component Value Date/Time    MG 1.9 01/19/2024 05:59 AM       Lab Results   Component Value Date    INR 1.53 (H) 01/12/2024    INR 1.80 (H) 01/11/2024    INR 2.08 (H) 01/10/2024         Lab Results   Component Value Date/Time    LABA1C 6.4 01/09/2024 05:40 AM       No results found for: \"TRIG\", \"HDL\", \"LDLCALC\", \"LDLDIRECT\"    Lab Results   Component Value Date/Time    TSH 4.310 01/08/2024 06:14 AM         Testing Reviewed:      I have individually reviewed the cardiac test below:    ECHO 1/2024    Left Ventricle: Normal left ventricular systolic function with a visually estimated EF of 55 - 60%. Left ventricle size is normal. Normal wall thickness. Normal wall motion. Normal diastolic function.    Aortic Valve: Not well visualized. Mildly calcified cusp.    Image quality is technically difficult. Technically difficult study.    Cardiac Monitor: 1/2024  Sinus bradycardia and high grade AV block with ventricular rate 30-41 mostly during sleep. One episode while awake (1/29/24 @ 9:04 PN)     AssessmentPlan:     Intermittent symptomatic bradycardia with AV block, short pauses   Atrial fibrillation   Premature ventricular complexes  S/p mechanical fall, subdural hemorrhage   DM  HTN    Has h/o paroxysmal atrial fibrillation   On 1/2024, patient

## 2024-03-27 NOTE — PLAN OF CARE
Problem: Discharge Planning  Goal: Discharge to home or other facility with appropriate resources  Outcome: Progressing  Flowsheets (Taken 1/9/2024 1102)  Discharge to home or other facility with appropriate resources:   Identify barriers to discharge with patient and caregiver   Arrange for needed discharge resources and transportation as appropriate   Identify discharge learning needs (meds, wound care, etc)   Arrange for interpreters to assist at discharge as needed     Problem: Safety - Adult  Goal: Free from fall injury  Outcome: Progressing  Flowsheets (Taken 1/9/2024 1102)  Free From Fall Injury:   Instruct family/caregiver on patient safety   Based on caregiver fall risk screen, instruct family/caregiver to ask for assistance with transferring infant if caregiver noted to have fall risk factors     Problem: Neurosensory - Adult  Goal: Achieves stable or improved neurological status  Outcome: Progressing  Flowsheets (Taken 1/9/2024 1102)  Achieves stable or improved neurological status:   Assess for and report changes in neurological status   Initiate measures to prevent increased intracranial pressure   Maintain blood pressure and fluid volume within ordered parameters to optimize cerebral perfusion and minimize risk of hemorrhage   Monitor temperature, glucose, and sodium. Initiate appropriate interventions as ordered     Problem: Cardiovascular - Adult  Goal: Maintains optimal cardiac output and hemodynamic stability  Outcome: Progressing  Flowsheets (Taken 1/9/2024 1102)  Maintains optimal cardiac output and hemodynamic stability:   Monitor urine output and notify Licensed Independent Practitioner for values outside of normal range   Monitor blood pressure and heart rate   Assess for signs of decreased cardiac output   Administer fluid and/or volume expanders as ordered  Goal: Absence of cardiac dysrhythmias or at baseline  Outcome: Progressing  Flowsheets (Taken 1/9/2024 1102)  Absence of cardiac  [Alert] : alert [Normal Voice/Communication] : normal voice/communication [Healthy Appearing] : healthy appearing [No Acute Distress] : no acute distress [Sclera] : the sclera and conjunctiva were normal [Hearing Threshold Finger Rub Not Floyd] : hearing was normal [Normal Lips/Gums] : the lips and gums were normal [Oropharynx] : the oropharynx was normal [Normal Appearance] : the appearance of the neck was normal [No Neck Mass] : no neck mass was observed [No Respiratory Distress] : no respiratory distress [No Acc Muscle Use] : no accessory muscle use [Respiration, Rhythm And Depth] : normal respiratory rhythm and effort [Auscultation Breath Sounds / Voice Sounds] : lungs were clear to auscultation bilaterally [Normal S1, S2] : normal S1 and S2 [Heart Rate And Rhythm] : heart rate was normal and rhythm regular [Murmurs] : no murmurs [Bowel Sounds] : normal bowel sounds [No Masses] : no abdominal mass palpated [Abdomen Tenderness] : non-tender [Abdomen Soft] : soft [] : no hepatosplenomegaly [Oriented To Time, Place, And Person] : oriented to person, place, and time

## 2024-12-10 ENCOUNTER — HOSPITAL ENCOUNTER (INPATIENT)
Age: 88
LOS: 2 days | Discharge: HOME | DRG: 544 | End: 2024-12-12
Attending: EMERGENCY MEDICINE | Admitting: HOSPITALIST
Payer: MEDICARE

## 2024-12-10 ENCOUNTER — APPOINTMENT (OUTPATIENT)
Dept: CT IMAGING | Age: 88
DRG: 544 | End: 2024-12-10
Payer: MEDICARE

## 2024-12-10 ENCOUNTER — APPOINTMENT (OUTPATIENT)
Dept: GENERAL RADIOLOGY | Age: 88
DRG: 544 | End: 2024-12-10
Payer: MEDICARE

## 2024-12-10 DIAGNOSIS — S32.030A CLOSED COMPRESSION FRACTURE OF L3 VERTEBRA, INITIAL ENCOUNTER (HCC): ICD-10-CM
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
## 2024-12-10 DIAGNOSIS — S22.080A COMPRESSION FRACTURE OF T12 VERTEBRA, INITIAL ENCOUNTER (HCC): Primary | ICD-10-CM

## 2024-12-10 DIAGNOSIS — M80.08XA VERTEBRAL FRACTURE, OSTEOPOROTIC, INITIAL ENCOUNTER (HCC): ICD-10-CM

## 2024-12-10 LAB
ALBUMIN SERPL BCG-MCNC: 4 G/DL (ref 3.5–5.1)
ALP SERPL-CCNC: 80 U/L (ref 38–126)
ALT SERPL W/O P-5'-P-CCNC: 21 U/L (ref 11–66)
ANION GAP SERPL CALC-SCNC: 12 MEQ/L (ref 8–16)
AST SERPL-CCNC: 22 U/L (ref 5–40)
BACTERIA URNS QL MICRO: ABNORMAL /HPF
BASOPHILS ABSOLUTE: 0 THOU/MM3 (ref 0–0.1)
BASOPHILS NFR BLD AUTO: 0.6 %
BILIRUB SERPL-MCNC: 1.3 MG/DL (ref 0.3–1.2)
BILIRUB UR QL STRIP.AUTO: NEGATIVE
BUN SERPL-MCNC: 13 MG/DL (ref 7–22)
CALCIUM SERPL-MCNC: 9.8 MG/DL (ref 8.5–10.5)
CASTS #/AREA URNS LPF: ABNORMAL /LPF
CASTS 2: ABNORMAL /LPF
CHARACTER UR: CLEAR
CHLORIDE SERPL-SCNC: 101 MEQ/L (ref 98–111)
CO2 SERPL-SCNC: 26 MEQ/L (ref 23–33)
COLOR, UA: ABNORMAL
CREAT SERPL-MCNC: 0.9 MG/DL (ref 0.4–1.2)
CRYSTALS URNS MICRO: ABNORMAL
DEPRECATED RDW RBC AUTO: 44.6 FL (ref 35–45)
EOSINOPHIL NFR BLD AUTO: 2.4 %
EOSINOPHILS ABSOLUTE: 0.2 THOU/MM3 (ref 0–0.4)
EPITHELIAL CELLS, UA: ABNORMAL /HPF
ERYTHROCYTE [DISTWIDTH] IN BLOOD BY AUTOMATED COUNT: 13.9 % (ref 11.5–14.5)
FLUAV RNA RESP QL NAA+PROBE: NOT DETECTED
FLUBV RNA RESP QL NAA+PROBE: NOT DETECTED
GFR SERPL CREATININE-BSD FRML MDRD: 80 ML/MIN/1.73M2
GLUCOSE SERPL-MCNC: 162 MG/DL (ref 70–108)
GLUCOSE UR QL STRIP.AUTO: NEGATIVE MG/DL
HCT VFR BLD AUTO: 44.9 % (ref 42–52)
HGB BLD-MCNC: 14.9 GM/DL (ref 14–18)
HGB UR QL STRIP.AUTO: NEGATIVE
IMM GRANULOCYTES # BLD AUTO: 0.02 THOU/MM3 (ref 0–0.07)
IMM GRANULOCYTES NFR BLD AUTO: 0.3 %
KETONES UR QL STRIP.AUTO: 15
LYMPHOCYTES ABSOLUTE: 1.3 THOU/MM3 (ref 1–4.8)
LYMPHOCYTES NFR BLD AUTO: 18.6 %
MCH RBC QN AUTO: 29.6 PG (ref 26–33)
MCHC RBC AUTO-ENTMCNC: 33.2 GM/DL (ref 32.2–35.5)
MCV RBC AUTO: 89.3 FL (ref 80–94)
MISCELLANEOUS 2: ABNORMAL
MONOCYTES ABSOLUTE: 0.6 THOU/MM3 (ref 0.4–1.3)
MONOCYTES NFR BLD AUTO: 8.1 %
NEUTROPHILS ABSOLUTE: 4.8 THOU/MM3 (ref 1.8–7.7)
NEUTROPHILS NFR BLD AUTO: 70 %
NITRITE UR QL STRIP: NEGATIVE
NRBC BLD AUTO-RTO: 0 /100 WBC
OSMOLALITY SERPL CALC.SUM OF ELEC: 281.2 MOSMOL/KG (ref 275–300)
PH UR STRIP.AUTO: 8 [PH] (ref 5–9)
PLATELET # BLD AUTO: 226 THOU/MM3 (ref 130–400)
PMV BLD AUTO: 10.7 FL (ref 9.4–12.4)
POTASSIUM SERPL-SCNC: 4.7 MEQ/L (ref 3.5–5.2)
PROT SERPL-MCNC: 6.6 G/DL (ref 6.1–8)
PROT UR STRIP.AUTO-MCNC: ABNORMAL MG/DL
RBC # BLD AUTO: 5.03 MILL/MM3 (ref 4.7–6.1)
RBC URINE: ABNORMAL /HPF
RENAL EPI CELLS #/AREA URNS HPF: ABNORMAL /[HPF]
SARS-COV-2 RNA RESP QL NAA+PROBE: NOT DETECTED
SODIUM SERPL-SCNC: 139 MEQ/L (ref 135–145)
SP GR UR REFRACT.AUTO: 1.02 (ref 1–1.03)
TROPONIN, HIGH SENSITIVITY: 24 NG/L (ref 0–12)
TROPONIN, HIGH SENSITIVITY: 26 NG/L (ref 0–12)
UROBILINOGEN, URINE: 1 EU/DL (ref 0–1)
WBC # BLD AUTO: 6.9 THOU/MM3 (ref 4.8–10.8)
WBC #/AREA URNS HPF: ABNORMAL /HPF
WBC #/AREA URNS HPF: NEGATIVE /[HPF]
YEAST LIKE FUNGI URNS QL MICRO: ABNORMAL

## 2024-12-10 PROCEDURE — 36415 COLL VENOUS BLD VENIPUNCTURE: CPT

## 2024-12-10 PROCEDURE — 72131 CT LUMBAR SPINE W/O DYE: CPT

## 2024-12-10 PROCEDURE — 85025 COMPLETE CBC W/AUTO DIFF WBC: CPT

## 2024-12-10 PROCEDURE — 6370000000 HC RX 637 (ALT 250 FOR IP): Performed by: HOSPITALIST

## 2024-12-10 PROCEDURE — 99285 EMERGENCY DEPT VISIT HI MDM: CPT

## 2024-12-10 PROCEDURE — 80053 COMPREHEN METABOLIC PANEL: CPT

## 2024-12-10 PROCEDURE — 81001 URINALYSIS AUTO W/SCOPE: CPT

## 2024-12-10 PROCEDURE — 99215 OFFICE O/P EST HI 40 MIN: CPT

## 2024-12-10 PROCEDURE — 84484 ASSAY OF TROPONIN QUANT: CPT

## 2024-12-10 PROCEDURE — 72125 CT NECK SPINE W/O DYE: CPT

## 2024-12-10 PROCEDURE — 1200000000 HC SEMI PRIVATE

## 2024-12-10 PROCEDURE — 70450 CT HEAD/BRAIN W/O DYE: CPT

## 2024-12-10 PROCEDURE — 72100 X-RAY EXAM L-S SPINE 2/3 VWS: CPT

## 2024-12-10 PROCEDURE — 99205 OFFICE O/P NEW HI 60 MIN: CPT | Performed by: NURSE PRACTITIONER

## 2024-12-10 PROCEDURE — 99222 1ST HOSP IP/OBS MODERATE 55: CPT | Performed by: HOSPITALIST

## 2024-12-10 PROCEDURE — 87636 SARSCOV2 & INF A&B AMP PRB: CPT

## 2024-12-10 RX ORDER — DONEPEZIL HYDROCHLORIDE 10 MG/1
10 TABLET, FILM COATED ORAL NIGHTLY
Status: DISCONTINUED | OUTPATIENT
Start: 2024-12-10 | End: 2024-12-12 | Stop reason: HOSPADM

## 2024-12-10 RX ORDER — POLYETHYLENE GLYCOL 3350 17 G/17G
17 POWDER, FOR SOLUTION ORAL DAILY PRN
Status: DISCONTINUED | OUTPATIENT
Start: 2024-12-10 | End: 2024-12-12 | Stop reason: HOSPADM

## 2024-12-10 RX ORDER — MAGNESIUM SULFATE IN WATER 40 MG/ML
2000 INJECTION, SOLUTION INTRAVENOUS PRN
Status: DISCONTINUED | OUTPATIENT
Start: 2024-12-10 | End: 2024-12-12 | Stop reason: HOSPADM

## 2024-12-10 RX ORDER — SODIUM CHLORIDE 0.9 % (FLUSH) 0.9 %
5-40 SYRINGE (ML) INJECTION PRN
Status: DISCONTINUED | OUTPATIENT
Start: 2024-12-10 | End: 2024-12-12 | Stop reason: HOSPADM

## 2024-12-10 RX ORDER — ACETAMINOPHEN 650 MG/1
650 SUPPOSITORY RECTAL EVERY 6 HOURS PRN
Status: DISCONTINUED | OUTPATIENT
Start: 2024-12-10 | End: 2024-12-12 | Stop reason: HOSPADM

## 2024-12-10 RX ORDER — LISINOPRIL 10 MG/1
10 TABLET ORAL DAILY
Status: DISCONTINUED | OUTPATIENT
Start: 2024-12-10 | End: 2024-12-12 | Stop reason: HOSPADM

## 2024-12-10 RX ORDER — VITAMIN B COMPLEX
1000 TABLET ORAL DAILY
Status: DISCONTINUED | OUTPATIENT
Start: 2024-12-11 | End: 2024-12-12 | Stop reason: HOSPADM

## 2024-12-10 RX ORDER — MAGNESIUM 30 MG
30 TABLET ORAL DAILY
Status: DISCONTINUED | OUTPATIENT
Start: 2024-12-10 | End: 2024-12-10 | Stop reason: RX

## 2024-12-10 RX ORDER — ALENDRONATE SODIUM 70 MG/1
70 TABLET ORAL
Status: DISCONTINUED | OUTPATIENT
Start: 2024-12-10 | End: 2024-12-10

## 2024-12-10 RX ORDER — SODIUM CHLORIDE 0.9 % (FLUSH) 0.9 %
5-40 SYRINGE (ML) INJECTION EVERY 12 HOURS SCHEDULED
Status: DISCONTINUED | OUTPATIENT
Start: 2024-12-10 | End: 2024-12-12 | Stop reason: HOSPADM

## 2024-12-10 RX ORDER — MULTIVITAMIN WITH IRON
1 TABLET ORAL DAILY
Status: DISCONTINUED | OUTPATIENT
Start: 2024-12-11 | End: 2024-12-12 | Stop reason: HOSPADM

## 2024-12-10 RX ORDER — LISINOPRIL AND HYDROCHLOROTHIAZIDE 10; 12.5 MG/1; MG/1
1 TABLET ORAL DAILY
Status: DISCONTINUED | OUTPATIENT
Start: 2024-12-10 | End: 2024-12-10 | Stop reason: SDUPTHER

## 2024-12-10 RX ORDER — SODIUM CHLORIDE 9 MG/ML
INJECTION, SOLUTION INTRAVENOUS PRN
Status: DISCONTINUED | OUTPATIENT
Start: 2024-12-10 | End: 2024-12-12 | Stop reason: HOSPADM

## 2024-12-10 RX ORDER — ONDANSETRON 4 MG/1
4 TABLET, ORALLY DISINTEGRATING ORAL EVERY 8 HOURS PRN
Status: DISCONTINUED | OUTPATIENT
Start: 2024-12-10 | End: 2024-12-12 | Stop reason: HOSPADM

## 2024-12-10 RX ORDER — POTASSIUM CHLORIDE 1500 MG/1
40 TABLET, EXTENDED RELEASE ORAL PRN
Status: DISCONTINUED | OUTPATIENT
Start: 2024-12-10 | End: 2024-12-12 | Stop reason: HOSPADM

## 2024-12-10 RX ORDER — ACETAMINOPHEN 325 MG/1
650 TABLET ORAL EVERY 6 HOURS PRN
Status: DISCONTINUED | OUTPATIENT
Start: 2024-12-10 | End: 2024-12-12 | Stop reason: HOSPADM

## 2024-12-10 RX ORDER — HYDROCHLOROTHIAZIDE 25 MG/1
12.5 TABLET ORAL DAILY
Status: DISCONTINUED | OUTPATIENT
Start: 2024-12-10 | End: 2024-12-12 | Stop reason: HOSPADM

## 2024-12-10 RX ORDER — MEMANTINE HYDROCHLORIDE 5 MG/1
10 TABLET ORAL 2 TIMES DAILY
Status: DISCONTINUED | OUTPATIENT
Start: 2024-12-10 | End: 2024-12-12 | Stop reason: HOSPADM

## 2024-12-10 RX ORDER — CALCIUM CARBONATE 500(1250)
500 TABLET ORAL DAILY
Status: DISCONTINUED | OUTPATIENT
Start: 2024-12-10 | End: 2024-12-12 | Stop reason: HOSPADM

## 2024-12-10 RX ORDER — HYDROCODONE BITARTRATE AND ACETAMINOPHEN 5; 325 MG/1; MG/1
1 TABLET ORAL EVERY 6 HOURS PRN
Status: DISCONTINUED | OUTPATIENT
Start: 2024-12-10 | End: 2024-12-12 | Stop reason: HOSPADM

## 2024-12-10 RX ORDER — ONDANSETRON 2 MG/ML
4 INJECTION INTRAMUSCULAR; INTRAVENOUS EVERY 6 HOURS PRN
Status: DISCONTINUED | OUTPATIENT
Start: 2024-12-10 | End: 2024-12-12 | Stop reason: HOSPADM

## 2024-12-10 RX ORDER — ATORVASTATIN CALCIUM 10 MG/1
10 TABLET, FILM COATED ORAL DAILY
Status: DISCONTINUED | OUTPATIENT
Start: 2024-12-10 | End: 2024-12-12 | Stop reason: HOSPADM

## 2024-12-10 RX ORDER — POTASSIUM CHLORIDE 7.45 MG/ML
10 INJECTION INTRAVENOUS PRN
Status: DISCONTINUED | OUTPATIENT
Start: 2024-12-10 | End: 2024-12-12 | Stop reason: HOSPADM

## 2024-12-10 RX ADMIN — CALCIUM 500 MG: 500 TABLET ORAL at 20:08

## 2024-12-10 RX ADMIN — LISINOPRIL 10 MG: 10 TABLET ORAL at 20:08

## 2024-12-10 RX ADMIN — ATORVASTATIN CALCIUM 10 MG: 10 TABLET, FILM COATED ORAL at 20:08

## 2024-12-10 RX ADMIN — DONEPEZIL HYDROCHLORIDE 10 MG: 10 TABLET, FILM COATED ORAL at 20:08

## 2024-12-10 RX ADMIN — HYDROCHLOROTHIAZIDE 12.5 MG: 25 TABLET ORAL at 20:09

## 2024-12-10 RX ADMIN — MEMANTINE HYDROCHLORIDE 10 MG: 5 TABLET ORAL at 20:08

## 2024-12-10 ASSESSMENT — ENCOUNTER SYMPTOMS
BACK PAIN: 1
COUGH: 0
CHEST TIGHTNESS: 0
DIARRHEA: 0
RHINORRHEA: 0
NAUSEA: 0
VOMITING: 0
SORE THROAT: 0
SHORTNESS OF BREATH: 0

## 2024-12-10 ASSESSMENT — PAIN SCALES - GENERAL
PAINLEVEL_OUTOF10: 0
PAINLEVEL_OUTOF10: 7
PAINLEVEL_OUTOF10: 4

## 2024-12-10 ASSESSMENT — PAIN - FUNCTIONAL ASSESSMENT
PAIN_FUNCTIONAL_ASSESSMENT: 0-10
PAIN_FUNCTIONAL_ASSESSMENT: 0-10
PAIN_FUNCTIONAL_ASSESSMENT: NONE - DENIES PAIN

## 2024-12-10 NOTE — ED TRIAGE NOTES
Present with wife. Unwitnessed fall 10 days ago. Tripped in bedroom, unknown if he hit any objects, denies hitting his head or any LOC. Wife went upstairs after the fall and states he was able to pull himself up by using the bed. Wife reports he fell again 6 days ago, landing straight onto his bottom. Denies any medication use to relieve pain. Pain more with movement, unable to reproduce pain with palpation. Has been urinating more than usual according to wife. Denies any hematuria or fever.

## 2024-12-10 NOTE — ED NOTES
Pt resting on cot at this time. Pt denies pain. Lab at bedside. Call light in reach. Family at bedside.  
Pt resting on cot at this time. Voiced no needs. Call light in reach. Family at bedside. Pt attempted to urinate without success. Pt changed into gown. Belongings bagged.   
Pt transported to [facility]. Floor contacted prior to transport, spoke to Arianne.   
on medication    Paroxysmal atrial fibrillation (HCC)     Prior to surgery           Electronically signed by Lennie Hayes RN on 12/10/2024 at 3:04 PM

## 2024-12-10 NOTE — H&P
History & Physical    Patient:  Romario Marie  YOB: 1932  Date of Service: 12/10/2024  MRN: [id number]   Acct:  [id number]   Primary Care Physician: Steve Santoro MD    Chief Complaint: back pain    History of Present Illness:   History obtained from the patient's wife.    The patient is a 89 y.o. male who presents with complaints of recurrent falls and back pain.  History is being provided by his wife who is at bedside as patient has dementia and is not a good historian.  She reports that 10 days ago she heard a thud upstairs and patient had fallen, it was unwitnessed however she said that he was alert and at his baseline mentation there was no suspected loss of consciousness.  She reports that after that fall he started to complain of some back pain but thought it would go away.  On Saturday patient was taking the trash out and had a mechanical fall and tripped over the trash bag, she did witness that fall and there was no LOC or head injury.  Patient's back pain has continued to worsen and presented today for evaluation.  Patient states that he is laying still he has no pain, he uses a cane occasionally with walking but is usually able to ambulate independently.  Per wife he has been ambulatory since his fall but limited due to pain.  He has no complaints of saddle anesthesia or numbness/tingling or pain in his lower extremities.    Patient states he has a history of atrial fibrillation and is on Eliquis.    In the ED due to the multiple falls patient had a CT head which was negative for bleed or acute injury.  CT thoracic lumbar spine was concerning for a T12 fracture with 50% height loss and retropulsion, old L3 endplate compression fracture.  [facility] contacted in the ED      Past Medical History:        Diagnosis Date    Diabetes mellitus (HCC)     Essential hypertension     H/O prostate cancer     Hyperlipidemia     Patient denies any diagnoses but is on medication    Paroxysmal

## 2024-12-10 NOTE — ED TRIAGE NOTES
Pt to ED from urgent care. Wife states that she was told the patient needs a CT scan of his back. Wife states the patient had a fall about 10 days ago, was found on his bottom next to their bed and was able to get up. Wife states that the patient fell to his bottom again about 5 days ago. Pt states that he has pain In his lower back with movement and when lying flat. No signs of injury to the pt head.

## 2024-12-10 NOTE — ED PROVIDER NOTES
[facility] EMERGENCY DEPT  EMERGENCY DEPARTMENT ENCOUNTER          Pt Name: Romario Marie  MRN: [id number]  Birthdate 2/26/1932  Date of evaluation: 12/10/2024  Physician: Kimberley Whitaker MD  Supervising Attending Physician: Raphael Vega MD       CHIEF COMPLAINT       Chief Complaint   Patient presents with    Back Pain         HISTORY OF PRESENT ILLNESS    HPI  Romario Marie is a 89 y.o. male who presents to the emergency department as a transfer from [facility], by private vehicle for evaluation of fall.  History was provided by patient and wife as patient has history of dementia.  Per patient's wife patient fell about 10 days ago, unwitnessed.  Patient wife states that she was downstairs doing the dishes when she heard a \"thud\" upstairs and went upstairs to find her  getting up from the floor.  She did note that he had some bleeding over his left cheek however patient states that was from him shaving but wife states she is not sure if that is true or not.  Wife states that about 6 days ago he fell again and landed on his bottom.  This fall was witnessed and did not hit his head nor had LOC.  Wife states that since patient fell about 10 days ago he has been complaining of lower back pain.  Patient states that the pain is bilateral located on his lower back, nonradiating.   Wife does note that patient has been urinating more often.  Patient states that it does not burn with urination and there is no blood with urination.  Per patient's wife patients mentation is at baseline.  The patient has no other acute complaints at this time.      REVIEW OF SYSTEMS   Review of Systems   All other systems reviewed and are negative.        PAST MEDICAL AND SURGICAL HISTORY     Past Medical History:   Diagnosis Date    Diabetes mellitus (HCC)     Essential hypertension     H/O prostate cancer     Hyperlipidemia     Patient denies any diagnoses but is on medication    Paroxysmal atrial fibrillation (HCC)  
SpO2: 95 %,Estimated body mass index is 24.66 kg/m² as calculated from the following:    Height as of 3/19/24: 1.753 m (5' 9\").    Weight as of this encounter: 75.8 kg (167 lb).,No LMP for male patient.    Physical Exam  Vitals and nursing note reviewed.   Constitutional:       General: He is not in acute distress.     Appearance: Normal appearance. He is not ill-appearing, toxic-appearing or diaphoretic.   HENT:      Head: Normocephalic.      Right Ear: Ear canal and external ear normal.      Left Ear: Ear canal and external ear normal.      Nose: Nose normal. No congestion or rhinorrhea.      Mouth/Throat:      Mouth: Mucous membranes are moist.      Pharynx: Oropharynx is clear. No oropharyngeal exudate or posterior oropharyngeal erythema.   Cardiovascular:      Rate and Rhythm: Normal rate.      Pulses: Normal pulses.   Pulmonary:      Effort: Pulmonary effort is normal. No respiratory distress.      Breath sounds: No stridor. No wheezing or rhonchi.   Abdominal:      General: Abdomen is flat. Bowel sounds are normal.      Palpations: Abdomen is soft.   Musculoskeletal:         General: No swelling or tenderness. Normal range of motion.      Cervical back: Normal range of motion.        Back:       Comments: Mildly tender to palpation.   Neurological:      General: No focal deficit present.      Mental Status: He is alert and oriented to person, place, and time.   Psychiatric:         Mood and Affect: Mood normal.         Behavior: Behavior normal.         DIAGNOSTIC RESULTS     Labs:No results found for this visit on 12/10/24.    IMAGING:    XR LUMBAR SPINE (2-3 VIEWS)   Final Result      1. Diffuse osteopenia.   2. Compression deformity involving the L3 vertebral body with 50% compression.   3. Lumbar spondylosis at L1-2, L2-3, L3-4, L4-5 and L5-S1.   4. Degenerative change involving the sacroiliac joints bilaterally..               **This report has been created using voice recognition software. It may 
may contain   minor errors which are inherent in voice recognition technology.**            Electronically signed by Dr. Marni Mendoza      XR LUMBAR SPINE (2-3 VIEWS)   Final Result      1. Diffuse osteopenia.   2. Compression deformity involving the L3 vertebral body with 50% compression.   3. Lumbar spondylosis at L1-2, L2-3, L3-4, L4-5 and L5-S1.   4. Degenerative change involving the sacroiliac joints bilaterally..               **This report has been created using voice recognition software. It may contain   minor errors which are inherent in voice recognition technology.**         Electronically signed by Dr. Marni Mendoza        Medications - No data to display  FINAL IMPRESSION AND DISPOSITION      1. Compression fracture of T12 vertebra, initial encounter ([facility])    2. Closed compression fracture of L3 vertebra, initial encounter ([facility])        DISPOSITION Admitted 12/10/2024 12:06:03 PM           PATIENT REFERRED TO:  [facility]    Go to   for furthe management of care    DISCHARGE MEDICATIONS:  New Prescriptions    No medications on file       (Please note that portions of this note were completed with a voice recognition program.  Efforts were made to edit the dictations but occasionally words aremis-transcribed.)    MD Silvia Burrell Jian, MD  12/10/24 [id number]

## 2024-12-11 LAB
ANION GAP SERPL CALC-SCNC: 14 MEQ/L (ref 8–16)
BASOPHILS ABSOLUTE: 0 THOU/MM3 (ref 0–0.1)
BASOPHILS NFR BLD AUTO: 0.4 %
BUN SERPL-MCNC: 10 MG/DL (ref 7–22)
CALCIUM SERPL-MCNC: 9.5 MG/DL (ref 8.5–10.5)
CHLORIDE SERPL-SCNC: 103 MEQ/L (ref 98–111)
CO2 SERPL-SCNC: 25 MEQ/L (ref 23–33)
CREAT SERPL-MCNC: 0.9 MG/DL (ref 0.4–1.2)
DEPRECATED RDW RBC AUTO: 45.9 FL (ref 35–45)
EOSINOPHIL NFR BLD AUTO: 4.3 %
EOSINOPHILS ABSOLUTE: 0.3 THOU/MM3 (ref 0–0.4)
ERYTHROCYTE [DISTWIDTH] IN BLOOD BY AUTOMATED COUNT: 14 % (ref 11.5–14.5)
GFR SERPL CREATININE-BSD FRML MDRD: 80 ML/MIN/1.73M2
GLUCOSE SERPL-MCNC: 130 MG/DL (ref 70–108)
HCT VFR BLD AUTO: 43.4 % (ref 42–52)
HGB BLD-MCNC: 14.6 GM/DL (ref 14–18)
IMM GRANULOCYTES # BLD AUTO: 0.03 THOU/MM3 (ref 0–0.07)
IMM GRANULOCYTES NFR BLD AUTO: 0.4 %
LYMPHOCYTES ABSOLUTE: 1.7 THOU/MM3 (ref 1–4.8)
LYMPHOCYTES NFR BLD AUTO: 22.4 %
MCH RBC QN AUTO: 30.2 PG (ref 26–33)
MCHC RBC AUTO-ENTMCNC: 33.6 GM/DL (ref 32.2–35.5)
MCV RBC AUTO: 89.9 FL (ref 80–94)
MONOCYTES ABSOLUTE: 0.8 THOU/MM3 (ref 0.4–1.3)
MONOCYTES NFR BLD AUTO: 10 %
NEUTROPHILS ABSOLUTE: 4.8 THOU/MM3 (ref 1.8–7.7)
NEUTROPHILS NFR BLD AUTO: 62.5 %
NRBC BLD AUTO-RTO: 0 /100 WBC
PLATELET # BLD AUTO: 232 THOU/MM3 (ref 130–400)
PMV BLD AUTO: 10.6 FL (ref 9.4–12.4)
POTASSIUM SERPL-SCNC: 3.9 MEQ/L (ref 3.5–5.2)
RBC # BLD AUTO: 4.83 MILL/MM3 (ref 4.7–6.1)
SODIUM SERPL-SCNC: 142 MEQ/L (ref 135–145)
WBC # BLD AUTO: 7.7 THOU/MM3 (ref 4.8–10.8)

## 2024-12-11 PROCEDURE — 97166 OT EVAL MOD COMPLEX 45 MIN: CPT

## 2024-12-11 PROCEDURE — 99232 SBSQ HOSP IP/OBS MODERATE 35: CPT | Performed by: PHYSICIAN ASSISTANT

## 2024-12-11 PROCEDURE — 97530 THERAPEUTIC ACTIVITIES: CPT

## 2024-12-11 PROCEDURE — 36415 COLL VENOUS BLD VENIPUNCTURE: CPT

## 2024-12-11 PROCEDURE — 85025 COMPLETE CBC W/AUTO DIFF WBC: CPT

## 2024-12-11 PROCEDURE — 80048 BASIC METABOLIC PNL TOTAL CA: CPT

## 2024-12-11 PROCEDURE — 1200000000 HC SEMI PRIVATE

## 2024-12-11 PROCEDURE — 6370000000 HC RX 637 (ALT 250 FOR IP): Performed by: HOSPITALIST

## 2024-12-11 PROCEDURE — 97162 PT EVAL MOD COMPLEX 30 MIN: CPT

## 2024-12-11 PROCEDURE — 97116 GAIT TRAINING THERAPY: CPT

## 2024-12-11 RX ADMIN — MEMANTINE HYDROCHLORIDE 10 MG: 5 TABLET ORAL at 20:13

## 2024-12-11 RX ADMIN — CALCIUM 500 MG: 500 TABLET ORAL at 09:15

## 2024-12-11 RX ADMIN — ATORVASTATIN CALCIUM 10 MG: 10 TABLET, FILM COATED ORAL at 09:15

## 2024-12-11 RX ADMIN — Medication 1000 UNITS: at 09:14

## 2024-12-11 RX ADMIN — Medication 1 TABLET: at 09:14

## 2024-12-11 RX ADMIN — DONEPEZIL HYDROCHLORIDE 10 MG: 10 TABLET, FILM COATED ORAL at 20:13

## 2024-12-11 RX ADMIN — MEMANTINE HYDROCHLORIDE 10 MG: 5 TABLET ORAL at 09:15

## 2024-12-11 RX ADMIN — LISINOPRIL 10 MG: 10 TABLET ORAL at 09:15

## 2024-12-11 RX ADMIN — HYDROCHLOROTHIAZIDE 12.5 MG: 25 TABLET ORAL at 09:15

## 2024-12-11 ASSESSMENT — PAIN SCALES - GENERAL: PAINLEVEL_OUTOF10: 0

## 2024-12-11 NOTE — CONSULTS
Inpatient Consultation    Romario Marie (2/26/1932)  12/11/2024    Reason for Consult:  Thoracic compression fracture       History Obtained From:  patient, spouse    HISTORY OF PRESENT ILLNESS:                The patient is a 89 y.o. male who presents with above chief complaint. Has a history of recurrent falls and low back pain; of note the patient has a history of dementia and wife was there to reports most of history. She notes ~10 days ago patient sustained an unwitnessed fall and subsequently another fall a few days ago which was witnessed, did not hit his head or experience LOC. Denies radicular complaints, numbness, tingling, or weakness.     Past Medical History:        Diagnosis Date    Diabetes mellitus (HCC)     Essential hypertension     H/O prostate cancer     Hx of blood clots     wife states 50 years ago in legs    Hyperlipidemia     Patient denies any diagnoses but is on medication    Paroxysmal atrial fibrillation (HCC)     Prior to surgery     Past Surgical History:        Procedure Laterality Date    CHOLECYSTECTOMY      FEMUR FRACTURE SURGERY Left 12/15/2018    LEFT FEMUR INTERTAN NAIL JACKY INSERTION performed by Earle Burkett MD at [facility] OR    HIP FRACTURE SURGERY  12/15/2018    Left intertrochanteric nailing and jacky     Current Medications:   Current Facility-Administered Medications: HYDROcodone-acetaminophen (NORCO) 5-325 MG per tablet 1 tablet, 1 tablet, Oral, Q6H PRN  calcium elemental (OSCAL) tablet 500 mg, 500 mg, Oral, Daily  donepezil (ARICEPT) tablet 10 mg, 10 mg, Oral, Nightly  memantine (NAMENDA) tablet 10 mg, 10 mg, Oral, BID  multivitamin 1 tablet, 1 tablet, Oral, Daily  atorvastatin (LIPITOR) tablet 10 mg, 10 mg, Oral, Daily  Vitamin D (CHOLECALCIFEROL) tablet 1,000 Units, 1,000 Units, Oral, Daily  sodium chloride flush 0.9 % injection 5-40 mL, 5-40 mL, IntraVENous, 2 times per day  sodium chloride flush 0.9 % injection 5-40 mL, 5-40 mL, IntraVENous, PRN  0.9 % sodium chloride

## 2024-12-11 NOTE — PLAN OF CARE
Problem: Chronic Conditions and Co-morbidities  Goal: Patient's chronic conditions and co-morbidity symptoms are monitored and maintained or improved  Outcome: Progressing  Flowsheets (Taken 12/10/2024 2126)  Care Plan - Patient's Chronic Conditions and Co-Morbidity Symptoms are Monitored and Maintained or Improved:   Monitor and assess patient's chronic conditions and comorbid symptoms for stability, deterioration, or improvement   Collaborate with multidisciplinary team to address chronic and comorbid conditions and prevent exacerbation or deterioration   Update acute care plan with appropriate goals if chronic or comorbid symptoms are exacerbated and prevent overall improvement and discharge     Problem: Discharge Planning  Goal: Discharge to home or other facility with appropriate resources  Outcome: Progressing  Flowsheets (Taken 12/10/2024 2126)  Discharge to home or other facility with appropriate resources:   Identify barriers to discharge with patient and caregiver   Identify discharge learning needs (meds, wound care, etc)   Arrange for needed discharge resources and transportation as appropriate     Problem: Pain  Goal: Verbalizes/displays adequate comfort level or baseline comfort level  Outcome: Progressing  Flowsheets (Taken 12/10/2024 2126)  Verbalizes/displays adequate comfort level or baseline comfort level:   Encourage patient to monitor pain and request assistance   Administer analgesics based on type and severity of pain and evaluate response   Assess pain using appropriate pain scale   Implement non-pharmacological measures as appropriate and evaluate response     Problem: Safety - Adult  Goal: Free from fall injury  Outcome: Progressing  Flowsheets (Taken 12/10/2024 2126)  Free From Fall Injury: Instruct family/caregiver on patient safety     Problem: ABCDS Injury Assessment  Goal: Absence of physical injury  Outcome: Progressing  Flowsheets (Taken 12/10/2024 2126)  Absence of Physical

## 2024-12-12 VITALS
BODY MASS INDEX: 22.24 KG/M2 | SYSTOLIC BLOOD PRESSURE: 113 MMHG | HEART RATE: 80 BPM | TEMPERATURE: 98.2 F | WEIGHT: 150.57 LBS | OXYGEN SATURATION: 95 % | DIASTOLIC BLOOD PRESSURE: 80 MMHG | RESPIRATION RATE: 18 BRPM

## 2024-12-12 LAB
ANION GAP SERPL CALC-SCNC: 13 MEQ/L (ref 8–16)
BASOPHILS ABSOLUTE: 0.1 THOU/MM3 (ref 0–0.1)
BASOPHILS NFR BLD AUTO: 0.6 %
BUN SERPL-MCNC: 13 MG/DL (ref 7–22)
CALCIUM SERPL-MCNC: 9.5 MG/DL (ref 8.5–10.5)
CHLORIDE SERPL-SCNC: 100 MEQ/L (ref 98–111)
CO2 SERPL-SCNC: 26 MEQ/L (ref 23–33)
CREAT SERPL-MCNC: 0.8 MG/DL (ref 0.4–1.2)
DEPRECATED RDW RBC AUTO: 45.1 FL (ref 35–45)
EOSINOPHIL NFR BLD AUTO: 3.6 %
EOSINOPHILS ABSOLUTE: 0.3 THOU/MM3 (ref 0–0.4)
ERYTHROCYTE [DISTWIDTH] IN BLOOD BY AUTOMATED COUNT: 13.9 % (ref 11.5–14.5)
GFR SERPL CREATININE-BSD FRML MDRD: 83 ML/MIN/1.73M2
GLUCOSE SERPL-MCNC: 112 MG/DL (ref 70–108)
HCT VFR BLD AUTO: 45.7 % (ref 42–52)
HGB BLD-MCNC: 15.2 GM/DL (ref 14–18)
IMM GRANULOCYTES # BLD AUTO: 0.02 THOU/MM3 (ref 0–0.07)
IMM GRANULOCYTES NFR BLD AUTO: 0.2 %
LYMPHOCYTES ABSOLUTE: 2.2 THOU/MM3 (ref 1–4.8)
LYMPHOCYTES NFR BLD AUTO: 24.4 %
MCH RBC QN AUTO: 29.6 PG (ref 26–33)
MCHC RBC AUTO-ENTMCNC: 33.3 GM/DL (ref 32.2–35.5)
MCV RBC AUTO: 89.1 FL (ref 80–94)
MONOCYTES ABSOLUTE: 0.7 THOU/MM3 (ref 0.4–1.3)
MONOCYTES NFR BLD AUTO: 8.4 %
NEUTROPHILS ABSOLUTE: 5.6 THOU/MM3 (ref 1.8–7.7)
NEUTROPHILS NFR BLD AUTO: 62.8 %
NRBC BLD AUTO-RTO: 0 /100 WBC
PLATELET # BLD AUTO: 247 THOU/MM3 (ref 130–400)
PMV BLD AUTO: 10.5 FL (ref 9.4–12.4)
POTASSIUM SERPL-SCNC: 4.1 MEQ/L (ref 3.5–5.2)
RBC # BLD AUTO: 5.13 MILL/MM3 (ref 4.7–6.1)
SODIUM SERPL-SCNC: 139 MEQ/L (ref 135–145)
WBC # BLD AUTO: 8.9 THOU/MM3 (ref 4.8–10.8)

## 2024-12-12 PROCEDURE — 6370000000 HC RX 637 (ALT 250 FOR IP): Performed by: HOSPITALIST

## 2024-12-12 PROCEDURE — 85025 COMPLETE CBC W/AUTO DIFF WBC: CPT

## 2024-12-12 PROCEDURE — 80048 BASIC METABOLIC PNL TOTAL CA: CPT

## 2024-12-12 PROCEDURE — 36415 COLL VENOUS BLD VENIPUNCTURE: CPT

## 2024-12-12 PROCEDURE — 99239 HOSP IP/OBS DSCHRG MGMT >30: CPT | Performed by: PHYSICIAN ASSISTANT

## 2024-12-12 PROCEDURE — 97535 SELF CARE MNGMENT TRAINING: CPT

## 2024-12-12 RX ORDER — TRAMADOL HYDROCHLORIDE 50 MG/1
50 TABLET ORAL EVERY 6 HOURS PRN
Qty: 12 TABLET | Refills: 0 | Status: SHIPPED | OUTPATIENT
Start: 2024-12-12 | End: 2024-12-15

## 2024-12-12 RX ADMIN — LISINOPRIL 10 MG: 10 TABLET ORAL at 08:57

## 2024-12-12 RX ADMIN — Medication 1 TABLET: at 08:57

## 2024-12-12 RX ADMIN — CALCIUM 500 MG: 500 TABLET ORAL at 08:57

## 2024-12-12 RX ADMIN — HYDROCHLOROTHIAZIDE 12.5 MG: 25 TABLET ORAL at 08:57

## 2024-12-12 RX ADMIN — ATORVASTATIN CALCIUM 10 MG: 10 TABLET, FILM COATED ORAL at 08:59

## 2024-12-12 RX ADMIN — Medication 1000 UNITS: at 08:57

## 2024-12-12 RX ADMIN — MEMANTINE HYDROCHLORIDE 10 MG: 5 TABLET ORAL at 08:57

## 2024-12-12 NOTE — DISCHARGE SUMMARY
are inherent in voice recognition technology.**      Electronically signed by Dr. Marni Mendoza      CT CERVICAL SPINE WO CONTRAST   Final Result      1. Stable CT scan of the cervical spine, no interval change since previous study   dated 1/7/2024.   2. No acute fracture noted..               **This report has been created using voice recognition software. It may contain   minor errors which are inherent in voice recognition technology.**            Electronically signed by Dr. Marni Mendoza      CT LUMBAR SPINE WO CONTRAST   Final Result      1. Diffuse osteopenia.   2. Acute compression fracture involving T12 with 50% compression. Mild   retropulsion into the spinal canal.   3. Probable old compression fracture along the superior endplate of L3.   4. Degenerative changes resulting in mild to moderate canal and moderate   bilateral foraminal stenosis at L3-4 and to a lesser extent L2-3.   5. There is mild canal and moderate bilateral foraminal stenosis at L4-5.   6. There is mild canal and mild to moderate bilateral foraminal stenosis at   T12-L1, L1-2 and to a lesser extent L5-S1.               **This report has been created using voice recognition software. It may contain   minor errors which are inherent in voice recognition technology.**            Electronically signed by Dr. Marni Mendoza      XR LUMBAR SPINE (2-3 VIEWS)   Final Result      1. Diffuse osteopenia.   2. Compression deformity involving the L3 vertebral body with 50% compression.   3. Lumbar spondylosis at L1-2, L2-3, L3-4, L4-5 and L5-S1.   4. Degenerative change involving the sacroiliac joints bilaterally..               **This report has been created using voice recognition software. It may contain   minor errors which are inherent in voice recognition technology.**         Electronically signed by Dr. Marni Mendoza             Consults:     IP CONSULT TO ORTHOPEDIC SURGERY  IP CONSULT TO SPIRITUAL SERVICES  IP CONSULT TO SPIRITUAL 
Private car

## 2024-12-12 NOTE — DISCHARGE INSTR - COC
Continuity of Care Form    Patient Name: Romario Marie   :  1932  MRN:  [id number]    Admit date:  12/10/2024  Discharge date:  ***    Code Status Order: Full Code   Advance Directives:   Advance Care Flowsheet Documentation             Admitting Physician:  No admitting provider for patient encounter.  PCP: Steve Santoro MD    Discharging Nurse: ***  Discharging Hospital Unit/Room#: 7K-22/022-A  Discharging Unit Phone Number: ***    Emergency Contact:   Extended Emergency Contact Information  Primary Emergency Contact: Tasneem Marie  Address: [address] [place] [address] [place] [address] [facility]  Home Phone: [phone]  Mobile Phone: [phone]  Relation: Spouse  Secondary Emergency Contact: Ronda Song  Mobile Phone: [phone]  Relation: Child    Past Surgical History:  Past Surgical History:   Procedure Laterality Date    CHOLECYSTECTOMY      FEMUR FRACTURE SURGERY Left 12/15/2018    LEFT FEMUR INTERTAN NAIL JACKY INSERTION performed by Earle Burkett MD at [facility] OR    HIP FRACTURE SURGERY  12/15/2018    Left intertrochanteric nailing and jacky       Immunization History:   Immunization History   Administered Date(s) Administered    COVID-19, MODERNA Bivalent, (age 12y+), IM, 50 mcg/0.5 mL 2022, 2023       Active Problems:  Patient Active Problem List   Diagnosis Code    Closed displaced intertrochanteric fracture of left femur (HCC) S72.142A    Type 2 diabetes mellitus (HCC) E11.9    Subdural hemorrhage (HCC) I62.00    Accidental fall on or from stairs or steps W10.8XXA    Essential hypertension, benign I10    Paroxysmal atrial fibrillation (HCC) I48.0    SDH (subdural hematoma) S06.5XAA    Permanent atrial fibrillation (HCC) I48.21    Subdural hematoma S06.5XAA    Vertebral fracture, osteoporotic, initial encounter ([facility]) M80.08XA       Isolation/Infection:   Isolation            No Isolation          Patient Infection Status       None to display                     Nurse

## 2024-12-12 NOTE — PROGRESS NOTES
Hospitalist Progress Note      Patient:  Romario Marie 89 y.o. male     : 1932  Unit/Bed:[address] [place] [address]  Date of Admission: 12/10/2024        ASSESSMENT AND PLAN    Active Problems  Acute T12 compression fracture due to mechanical fall  CT of C/T/L spine demonstrating acute T12 fracture with 50% compression and mild retropulsion in the spinal canal, chronic L3 compression fracture.   Ortho spine consulted- waiting for further recommendations   PT and OT    Multiple falls and debility  PT and OT to evaluate  CTH negative for any bleeding     Atrial fibrillation   No rate control medications. Eliquis on hold given falls and possible need for kyphoplasty given compression fx      Resolved Problems    Chronic Conditions (reviewed and stable unless otherwise stated)  Dementia- continue aricept and namenda  Hypertension- continue lisinopril and HCTZ      LDA: []CVC / []PICC / []Midline / []Alexandra / []Drains / []Mediport / []None  Antibiotics: none  Steroids: none  Labs (still needed?): [x]Yes / []No  IVF (still needed?): []Yes / [x]No    Level of care: []Step Down / [x]Med-Surg  Bed Status: [x]Inpatient / []Observation  Telemetry: []Yes / [x]No  PT/OT: [x]Yes / []No    DVT Prophylaxis: [] Lovenox / [] Heparin / [x] SCDs / [] Already on Systemic Anticoagulation / [] None     Expected discharge date:  1-2 days   Disposition: home   Code status: Full Code     ===================================================================    Chief Complaint: fall, back pain     Subjective (past 24 hours):   Patient doing well. Wife is at bedside. Stated that Dr Meyers was in this morning and contemplating \"cement procedure\"       Medications:    Infusion Medications    sodium chloride      Scheduled Medications    calcium elemental  500 mg Oral Daily    donepezil  10 mg Oral Nightly    memantine  10 mg Oral BID    multivitamin  1 tablet Oral Daily    atorvastatin  10 mg Oral Daily    Vitamin D  1,000 Units Oral Daily    sodium 
6482--SW met briefly with pt for HOMER trauma SBIRT. Pt was unable to answer simple questions, such as what happened that caused you to come to the hospital, or did you ffall.   Pt referenced his wife Tasneem, and said she left already but that she's been here every day. Pt seems somewhat confused. HOMER to continue attempts.   
Advance Care Planning     Advance Care Planning Inpatient Note  [facility]    Today's Date: 12/10/2024  Unit: [facility]    Received request from IDT Member.  Upon review of chart and communication with care team, Spiritual Care will defer advance care planning with patient at this time.. Patient and Spouse was/were present in the room during visit.    Goals of ACP Conversation:  Discuss advance care planning documents    Health Care Decision Makers:       Primary Decision Maker: Tasneem Marie - Spouse - [phone]    Secondary Decision Maker: Ronda Song - Child - [phone]  Summary:  No Decision Maker named by patient at this time    Advance Care Planning Documents (Patient Wishes):  None     Assessment:  Romario is a 89 year old male resting comfortably in his bed. Patient is awake, calm and approachable. Patient wife is also in the room with him an engaged in conversation with me. This visit is in response to a spiritual consult to discuss Advance Care Planing document with patient. During this encounter, I discussed with patient [place] DNR-formes pertaining to patient's code status options. This included DNR-CCA, DNR-CC and Full Code options. Patient and his wife said their daughter is coming in from [place] on Wednesday and they would like her to be here when the form is completed. Patient and his wife are aware that [facility]  will return to complete the spiritual consult when they are ready. Presently, has no ACP document and he is listed as Full Code.     Interventions:  Encouraged ongoing ACP conversation with future decision makers and loved ones    Care Preferences Communicated:   No    Outcomes/Plan:  ACP Discussion: Postponed    Electronically signed by JUAN RAMON Franklin on 12/10/2024 at 5:13 PM           
Chart review for SBIRT per trauma service. Pt has a history of \"dementia and is not a good historian.\" Pt not appropriate, unable to complete.  
[facility]  INPATIENT OCCUPATIONAL THERAPY  [facility]  EVALUATION      Discharge Recommendations: Continue to assess pending progress, Home with assist PRN  Equipment Recommendations: No        Time In: 1132  Time Out: 1151  Timed Code Treatment Minutes: 11 Minutes  Minutes: 19          Date: 2024  Patient Name: Romario Marie,   Gender: male      MRN: [id number]  : 1932  (89 y.o.)  Referring Practitioner: Vivian Cole MD  Diagnosis: Vertebral fracture, osteoporotic, initial encounter ([facility])  Additional Pertinent Hx: Per EMR, \"The patient is a 89 y.o. male who presents with complaints of recurrent falls and back pain.  History is being provided by his wife who is at bedside as patient has dementia and is not a good historian.  She reports that 10 days ago she heard a thud upstairs and patient had fallen, it was unwitnessed however she said that he was alert and at his baseline mentation there was no suspected loss of consciousness.  She reports that after that fall he started to complain of some back pain but thought it would go away.  On Saturday patient was taking the trash out and had a mechanical fall and tripped over the trash bag, she did witness that fall and there was no LOC or head injury.  Patient's back pain has continued to worsen and presented today for evaluation.  Patient states that he is laying still he has no pain, he uses a cane occasionally with walking but is usually able to ambulate independently.  Per wife he has been ambulatory since his fall but limited due to pain.  He has no complaints of saddle anesthesia or numbness/tingling or pain in his lower extremities.     Patient states he has a history of atrial fibrillation and is on Eliquis.     In the ED due to the multiple falls patient had a CT head which was negative for bleed or acute injury.  CT thoracic lumbar spine was concerning for a T12 fracture with 50% height loss and retropulsion, old L3 
Patient released in stable condition. Instructed to go directly to [facility] emergency department for further evaluation and treatment. Patient verbalized understanding. Ambulated, per self, to private car.   
Pt admitted to  7K22 from ED.     Complaints: back pain.      No IV site on patient. Vital signs obtained. Assessment and data collection initiated.     Two nurse skin assessment performed by Brina RN and Lynn RN. Oriented to room.     Explained patients right to have family, representative or physician notified of their admission.  Patient has Declined for physician to be notified.  Patient has Declined for family/representative to be notified.    The patient is interested in [facility] meds to beds program?:  No    Policies and procedures for  explained. All questions answered with no further questions at this time. Fall prevention and safety brochure discussed with patient.  Bed alarm on. Call light in reach.        
[facility] ORTHOPEDICS 7K  Occupational Therapy  Daily Note    Discharge Recommendations: 24 hour assistance or supervision, Home with assist as needed, and Home with Home Health OT  Equipment Recommendations: No        Time In: 08  Time Out: 919  Timed Code Treatment Minutes: 40 Minutes  Minutes: 40          Date: 2024  Patient Name: Romario Marie,   Gender: male      Room: [address] [place] [address]  MRN: [id number]  : 1932  (89 y.o.)  Referring Practitioner: Vivian Cole MD  Diagnosis: Vertebral fracture, osteoporotic, initial encounter (HCC)  Additional Pertinent Hx: Per EMR, \"The patient is a 89 y.o. male who presents with complaints of recurrent falls and back pain.  History is being provided by his wife who is at bedside as patient has dementia and is not a good historian.  She reports that 10 days ago she heard a thud upstairs and patient had fallen, it was unwitnessed however she said that he was alert and at his baseline mentation there was no suspected loss of consciousness.  She reports that after that fall he started to complain of some back pain but thought it would go away.  On Saturday patient was taking the trash out and had a mechanical fall and tripped over the trash bag, she did witness that fall and there was no LOC or head injury.  Patient's back pain has continued to worsen and presented today for evaluation.  Patient states that he is laying still he has no pain, he uses a cane occasionally with walking but is usually able to ambulate independently.  Per wife he has been ambulatory since his fall but limited due to pain.  He has no complaints of saddle anesthesia or numbness/tingling or pain in his lower extremities.     Patient states he has a history of atrial fibrillation and is on Eliquis.     In the ED due to the multiple falls patient had a CT head which was negative for bleed or acute injury.  CT thoracic lumbar spine was concerning for a T12 fracture with 
rail for support and S to return home safely.  Long Term Goals  Time Frame for Long Term Goals : NA due to short ELOS    Following session, patient left in safe position with all fall risk precautions in place. Pt in bedside chair following session, all needs and call light in reach, alarm on.

## 2024-12-12 NOTE — CARE COORDINATION
12/12/24, 12:40 PM EST    Patient goals/plan/ treatment preferences discussed by  and .  Patient goals/plan/ treatment preferences reviewed with patient/ family.  Patient/ family verbalize understanding of discharge plan and are in agreement with goal/plan/treatment preferences.  Understanding was demonstrated using the teach back method.  AVS provided by RN at time of discharge, which includes all necessary medical information pertaining to the patients current course of illness, treatment, post-discharge goals of care, and treatment preferences.     Services At/After Discharge: Outpatient and PT     Planning OP PT at [facility]; referral faxed to facility. Home today with [facility] providing transportation.    
bilaterally     Patient Goals/Plan/Treatment Preferences: Met with Romario cerda by \"[employer]\" as he is from home with wife; follow therapy recs.  Pt has quad cane, requesting OP PT at [facility] (need orders).  Await orthospine plan of care (standing xray requested).                12/11/24 1150   Service Assessment   Patient Orientation Alert and Oriented   Cognition Alert   History Provided By Patient   Primary Caregiver Self   Accompanied By/Relationship wife   Support Systems Spouse/Significant Other   Patient's Healthcare Decision Maker is: Legal Next of Kin   PCP Verified by CM Yes   Last Visit to PCP Within last 3 months   Prior Functional Level Independent in ADLs/IADLs   Current Functional Level Assistance with the following:;Housework;Shopping   Can patient return to prior living arrangement Yes   Ability to make needs known: Good   Family able to assist with home care needs: No   Would you like for me to discuss the discharge plan with any other family members/significant others, and if so, who? No   Financial Resources Medicare  ([employer] Medicare)   Community Resources None   CM/SW Referral DME;Other (see comment)  (OP PT or HH)   Social/Functional History   Lives With Spouse   Type of Home House   Home Layout Two level   Active  No   Discharge Planning   Type of Residence House   Living Arrangements Spouse/Significant Other   Current Services Prior To Admission Durable Medical Equipment   Current DME Prior to Arrival Cane;Shower Chair   Potential Assistance Needed Outpatient PT/OT;Home Care   DME Ordered? No   Potential Assistance Purchasing Medications No   Type of Home Care Services None   Patient expects to be discharged to: House   Follow Up Appointment: Best Day/Time  Monday AM   One/Two Story Residence Two story   Lift Chair Available No   History of falls? 1   Services At/After Discharge   Transition of Care Consult (CM Consult) Discharge Planning   Services At/After Discharge PT;Outpatient

## 2024-12-12 NOTE — PLAN OF CARE
Problem: Chronic Conditions and Co-morbidities  Goal: Patient's chronic conditions and co-morbidity symptoms are monitored and maintained or improved  Outcome: Completed     Problem: Discharge Planning  Goal: Discharge to home or other facility with appropriate resources  Outcome: Completed     Problem: Pain  Goal: Verbalizes/displays adequate comfort level or baseline comfort level  Outcome: Completed     Problem: Safety - Adult  Goal: Free from fall injury  Outcome: Completed     Problem: ABCDS Injury Assessment  Goal: Absence of physical injury  Outcome: Completed     Problem: Skin/Tissue Integrity  Goal: Absence of new skin breakdown  Description: 1.  Monitor for areas of redness and/or skin breakdown  2.  Assess vascular access sites hourly  3.  Every 4-6 hours minimum:  Change oxygen saturation probe site  4.  Every 4-6 hours:  If on nasal continuous positive airway pressure, respiratory therapy assess nares and determine need for appliance change or resting period.  Outcome: Completed  ..Care plan reviewed with patient.  Patient and son-in-law verbalize understanding of the plan of care and contribute to goal setting.

## 2025-03-12 ENCOUNTER — OFFICE VISIT (OUTPATIENT)
Dept: CARDIOLOGY CLINIC | Age: 89
End: 2025-03-12
Payer: MEDICARE

## 2025-03-12 VITALS
SYSTOLIC BLOOD PRESSURE: 149 MMHG | WEIGHT: 151 LBS | BODY MASS INDEX: 22.36 KG/M2 | HEART RATE: 72 BPM | HEIGHT: 69 IN | DIASTOLIC BLOOD PRESSURE: 76 MMHG

## 2025-03-12 DIAGNOSIS — I48.21 PERMANENT ATRIAL FIBRILLATION (HCC): Primary | ICD-10-CM

## 2025-03-12 DIAGNOSIS — R00.1 BRADYCARDIA: ICD-10-CM

## 2025-03-12 PROCEDURE — 1036F TOBACCO NON-USER: CPT | Performed by: INTERNAL MEDICINE

## 2025-03-12 PROCEDURE — 99214 OFFICE O/P EST MOD 30 MIN: CPT | Performed by: INTERNAL MEDICINE

## 2025-03-12 PROCEDURE — 1159F MED LIST DOCD IN RCRD: CPT | Performed by: INTERNAL MEDICINE

## 2025-03-12 PROCEDURE — G8420 CALC BMI NORM PARAMETERS: HCPCS | Performed by: INTERNAL MEDICINE

## 2025-03-12 PROCEDURE — 1123F ACP DISCUSS/DSCN MKR DOCD: CPT | Performed by: INTERNAL MEDICINE

## 2025-03-12 PROCEDURE — G8427 DOCREV CUR MEDS BY ELIG CLIN: HCPCS | Performed by: INTERNAL MEDICINE

## 2025-03-12 PROCEDURE — 93000 ELECTROCARDIOGRAM COMPLETE: CPT | Performed by: INTERNAL MEDICINE

## 2025-03-12 NOTE — PROGRESS NOTES
[facility] [place] [facility]  [facility] CARDIOLOGY  [address]  [address]  [place] [address]  Dept: [phone]  Dept Fax: [phone]  Loc: [phone]    Visit Date: 3/12/2025  Mr. Marie is a 89 y.o. male who presented for:  PAF  H/o Bradycardia  HPI:   Romario Marie is a pleasant 89 y.o. male who  has a past medical history of Diabetes mellitus (HCC), Essential hypertension, H/O prostate cancer, Hx of blood clots, Hyperlipidemia, and Paroxysmal atrial fibrillation (HCC). Echocardiogram on 1/9/2024 revealed an ejection fraction of 55-60%, no regional wall motion abnormality was detected. Patient was noted to have bradycardia. Cardiac event monitor on 1/2024 revealed sinus bradycardia, intermittent AV block. Patient was referred to EP and was seen by Dr Carpenter, no PPM with continued monitoring was recommended. The patient is accompanied by his wife. Patient denies chest pain, shortness of breath, dyspnea on exertion. No palpitations, dizziness, syncope.       Current Outpatient Medications:     apixaban (ELIQUIS) 2.5 MG TABS tablet, Take 1 tablet by mouth 2 times daily first dose one week after last dose of coumadin, Disp: 180 tablet, Rfl: 1    POTASSIUM CHLORIDE ER PO, Take by mouth Doseage unknown, Disp: , Rfl:     MEMANTINE HCL PO, Take by mouth Not sure of doseage, Disp: , Rfl:     DONEPEZIL HCL PO, Take by mouth Not sure of dose, Disp: , Rfl:     Multiple Vitamins-Minerals (THERAPEUTIC MULTIVITAMIN-MINERALS) tablet, Take 1 tablet by mouth daily Vitaprime, Disp: , Rfl:     metFORMIN (GLUCOPHAGE-XR) 500 MG extended release tablet, Take 1 tablet by mouth daily (with breakfast) 3 tablets with supper, Disp: , Rfl:     alendronate (FOSAMAX) 70 MG tablet, Take 1 tablet by mouth every 7 days, Disp: , Rfl:     lisinopril-hydrochlorothiazide (PRINZIDE;ZESTORETIC) 10-12.5 MG per tablet, Take 1 tablet by mouth daily Indications: 5 mg, Disp: , Rfl:     simvastatin (ZOCOR) 20 MG tablet,

## 2025-08-11 ENCOUNTER — HOSPITAL ENCOUNTER (OUTPATIENT)
Dept: GENERAL RADIOLOGY | Age: 89
Discharge: HOME | End: 2025-08-11
Payer: MEDICARE

## 2025-08-11 DIAGNOSIS — R06.02 SOB (SHORTNESS OF BREATH): ICD-10-CM

## 2025-08-11 DIAGNOSIS — M54.6 THORACIC SPINE PAIN: ICD-10-CM

## 2025-08-11 DIAGNOSIS — M54.50 LUMBAR PAIN: ICD-10-CM

## 2025-08-11 PROCEDURE — 72100 X-RAY EXAM L-S SPINE 2/3 VWS: CPT

## 2025-08-11 PROCEDURE — 71046 X-RAY EXAM CHEST 2 VIEWS: CPT

## 2025-08-11 PROCEDURE — 72072 X-RAY EXAM THORAC SPINE 3VWS: CPT

## (undated) DEVICE — GLOVE ORANGE PI 7   MSG9070

## (undated) DEVICE — SPONGE LAP W18XL18IN WHT COT 4 PLY FLD STRUNG RADPQ DISP ST

## (undated) DEVICE — GOWN,SIRUS,NON REINFRCD,LARGE,SET IN SL: Brand: MEDLINE

## (undated) DEVICE — SOLUTION IV 1000ML LAC RINGERS PH 6.5 INJ USP VIAFLX PLAS

## (undated) DEVICE — YANKAUER,BULB TIP,W/O VENT,RIGID,STERILE: Brand: MEDLINE

## (undated) DEVICE — TUBING, SUCTION, 1/4" X 20', STRAIGHT: Brand: MEDLINE INDUSTRIES, INC.

## (undated) DEVICE — SPONGE GZ W4XL4IN COT 12 PLY TYP VII WVN C FLD DSGN

## (undated) DEVICE — SUTURE PERMA-HAND 4-0 L18IN NONABSORBABLE BLK L13MM TF 1/2 M104T

## (undated) DEVICE — SUTURE MCRYL SZ 3-0 L27IN ABSRB UD L24MM PS-1 3/8 CIR PRIM Y936H

## (undated) DEVICE — PADDING,UNDERCAST,COTTON, 4"X4YD STERILE: Brand: MEDLINE

## (undated) DEVICE — 3.0MM X 1000MM BALL TIP GUIDE ROD: Brand: TRIGEN

## (undated) DEVICE — DRAPE C ARM W36XL30IN RECTANG BND BG AND TAPE

## (undated) DEVICE — 4.0MM SHORT PILOT DRILL WITH AO CONNECTOR: Brand: TRIGEN

## (undated) DEVICE — IMPREGNATED GAUZE DRESSING: Brand: CUTICERIN 7.5X7.5CM CTN 50

## (undated) DEVICE — BLADE CLIPPER GEN PURP NS

## (undated) DEVICE — SYRINGE IRRIG 60ML SFT PLIABLE BLB EZ TO GRP 1 HND USE W/

## (undated) DEVICE — LINER SUCT CANSTR 1500CC SEMI RIG W/ POR HYDROPHOBIC SHUT

## (undated) DEVICE — GUIDE PIN 3.2MM X 343MM: Brand: TRIGEN

## (undated) DEVICE — 3M™ WARMING BLANKET, UPPER BODY, 10 PER CASE, 42268: Brand: BAIR HUGGER™

## (undated) DEVICE — DRESSING TRNSPAR W5XL4.5IN FLM SHT SEMIPERMEABLE WIND

## (undated) DEVICE — BLADE LARYNSCP SZ 3 ENH DIR INTUB GLIDESCOPE MCGRATH MAC

## (undated) DEVICE — SUTURE VCRL SZ 0 L27IN ABSRB UD L36MM CP-1 1/2 CIR REV CUT J267H

## (undated) DEVICE — SOLUTION IV IRRIG WATER 1000ML POUR BRL 2F7114

## (undated) DEVICE — LIMB HOLDER, WRIST/ANKLE: Brand: DEROYAL

## (undated) DEVICE — C-ARMOR C-ARM EQUIPMENT COVERS CLEAR STERILE UNIVERSAL FIT 12 PER CASE: Brand: C-ARMOR

## (undated) DEVICE — DRAPE,U/SHT,SPLIT,FILM,60X84,STERILE: Brand: MEDLINE

## (undated) DEVICE — GLOVE ORANGE PI 8 1/2   MSG9085